# Patient Record
Sex: MALE | Race: WHITE | Employment: OTHER | URBAN - METROPOLITAN AREA
[De-identification: names, ages, dates, MRNs, and addresses within clinical notes are randomized per-mention and may not be internally consistent; named-entity substitution may affect disease eponyms.]

---

## 2018-03-23 ENCOUNTER — RECORDS - HEALTHEAST (OUTPATIENT)
Dept: LAB | Facility: CLINIC | Age: 78
End: 2018-03-23

## 2018-03-23 LAB — INR PPP: 1.68 (ref 0.9–1.1)

## 2018-03-29 ENCOUNTER — RECORDS - HEALTHEAST (OUTPATIENT)
Dept: LAB | Facility: CLINIC | Age: 78
End: 2018-03-29

## 2018-03-30 LAB — INR PPP: 1.53 (ref 0.9–1.1)

## 2018-04-05 ENCOUNTER — RECORDS - HEALTHEAST (OUTPATIENT)
Dept: LAB | Facility: CLINIC | Age: 78
End: 2018-04-05

## 2018-04-06 LAB — INR PPP: 1.93 (ref 0.9–1.1)

## 2018-04-12 ENCOUNTER — RECORDS - HEALTHEAST (OUTPATIENT)
Dept: LAB | Facility: CLINIC | Age: 78
End: 2018-04-12

## 2018-04-13 LAB — INR PPP: 1.87 (ref 0.9–1.1)

## 2018-04-17 DIAGNOSIS — Z79.01 LONG-TERM (CURRENT) USE OF ANTICOAGULANTS: Primary | ICD-10-CM

## 2018-04-17 PROCEDURE — 85610 PROTHROMBIN TIME: CPT | Performed by: FAMILY MEDICINE

## 2018-04-17 PROCEDURE — 36415 COLL VENOUS BLD VENIPUNCTURE: CPT | Performed by: FAMILY MEDICINE

## 2018-04-18 DIAGNOSIS — Z79.01 LONG-TERM (CURRENT) USE OF ANTICOAGULANTS: Primary | ICD-10-CM

## 2018-04-18 LAB — INR PPP: 2.48 (ref 0.86–1.14)

## 2018-04-23 ENCOUNTER — RECORDS - HEALTHEAST (OUTPATIENT)
Dept: LAB | Facility: CLINIC | Age: 78
End: 2018-04-23

## 2018-04-24 LAB — INR PPP: 3.35 (ref 0.9–1.1)

## 2018-05-01 ENCOUNTER — RECORDS - HEALTHEAST (OUTPATIENT)
Dept: LAB | Facility: CLINIC | Age: 78
End: 2018-05-01

## 2018-05-02 LAB — INR PPP: 3.09 (ref 0.9–1.1)

## 2018-05-16 ENCOUNTER — RECORDS - HEALTHEAST (OUTPATIENT)
Dept: LAB | Facility: CLINIC | Age: 78
End: 2018-05-16

## 2018-05-17 LAB — INR PPP: 3.83 (ref 0.9–1.1)

## 2018-05-24 ENCOUNTER — RECORDS - HEALTHEAST (OUTPATIENT)
Dept: LAB | Facility: CLINIC | Age: 78
End: 2018-05-24

## 2018-05-24 LAB
INR PPP: 2.87 (ref 0.9–1.1)
TSH SERPL DL<=0.005 MIU/L-ACNC: 1.06 UIU/ML (ref 0.3–5)

## 2018-05-30 ENCOUNTER — RECORDS - HEALTHEAST (OUTPATIENT)
Dept: LAB | Facility: CLINIC | Age: 78
End: 2018-05-30

## 2018-05-30 LAB — INR PPP: 2.93 (ref 0.9–1.1)

## 2018-06-07 ENCOUNTER — RECORDS - HEALTHEAST (OUTPATIENT)
Dept: LAB | Facility: CLINIC | Age: 78
End: 2018-06-07

## 2018-06-08 ENCOUNTER — MEDICAL CORRESPONDENCE (OUTPATIENT)
Dept: HEALTH INFORMATION MANAGEMENT | Facility: CLINIC | Age: 78
End: 2018-06-08

## 2018-06-08 LAB — INR PPP: 3.76 (ref 0.9–1.1)

## 2018-06-18 ENCOUNTER — RECORDS - HEALTHEAST (OUTPATIENT)
Dept: LAB | Facility: CLINIC | Age: 78
End: 2018-06-18

## 2018-06-18 LAB — INR PPP: 3.07 (ref 0.9–1.1)

## 2018-06-29 ENCOUNTER — RECORDS - HEALTHEAST (OUTPATIENT)
Dept: LAB | Facility: CLINIC | Age: 78
End: 2018-06-29

## 2018-07-02 LAB — INR PPP: 3.01 (ref 0.9–1.1)

## 2018-07-16 ENCOUNTER — RECORDS - HEALTHEAST (OUTPATIENT)
Dept: LAB | Facility: CLINIC | Age: 78
End: 2018-07-16

## 2018-07-16 LAB — INR PPP: 3.55 (ref 0.9–1.1)

## 2018-07-25 ENCOUNTER — RECORDS - HEALTHEAST (OUTPATIENT)
Dept: LAB | Facility: CLINIC | Age: 78
End: 2018-07-25

## 2018-07-25 LAB
ALBUMIN SERPL-MCNC: 4 G/DL (ref 3.5–5)
ALP SERPL-CCNC: 67 U/L (ref 45–120)
ALT SERPL W P-5'-P-CCNC: 23 U/L (ref 0–45)
ANION GAP SERPL CALCULATED.3IONS-SCNC: 11 MMOL/L (ref 5–18)
AST SERPL W P-5'-P-CCNC: 25 U/L (ref 0–40)
BILIRUB SERPL-MCNC: 0.5 MG/DL (ref 0–1)
BUN SERPL-MCNC: 23 MG/DL (ref 8–28)
CALCIUM SERPL-MCNC: 9.7 MG/DL (ref 8.5–10.5)
CHLORIDE BLD-SCNC: 107 MMOL/L (ref 98–107)
CO2 SERPL-SCNC: 21 MMOL/L (ref 22–31)
CREAT SERPL-MCNC: 1.23 MG/DL (ref 0.7–1.3)
ERYTHROCYTE [DISTWIDTH] IN BLOOD BY AUTOMATED COUNT: 13.2 % (ref 11–14.5)
GFR SERPL CREATININE-BSD FRML MDRD: 57 ML/MIN/1.73M2
GLUCOSE BLD-MCNC: 171 MG/DL (ref 70–125)
HCT VFR BLD AUTO: 39 % (ref 40–54)
HGB BLD-MCNC: 13.3 G/DL (ref 14–18)
MCH RBC QN AUTO: 32.1 PG (ref 27–34)
MCHC RBC AUTO-ENTMCNC: 34.1 G/DL (ref 32–36)
MCV RBC AUTO: 94 FL (ref 80–100)
PLATELET # BLD AUTO: 288 THOU/UL (ref 140–440)
PMV BLD AUTO: 10.7 FL (ref 8.5–12.5)
POTASSIUM BLD-SCNC: 4.1 MMOL/L (ref 3.5–5)
PROT SERPL-MCNC: 7.3 G/DL (ref 6–8)
RBC # BLD AUTO: 4.14 MILL/UL (ref 4.4–6.2)
SODIUM SERPL-SCNC: 139 MMOL/L (ref 136–145)
TSH SERPL DL<=0.005 MIU/L-ACNC: 0.98 UIU/ML (ref 0.3–5)
WBC: 8.4 THOU/UL (ref 4–11)

## 2018-07-26 LAB
25(OH)D3 SERPL-MCNC: 23.9 NG/ML (ref 30–80)
HBA1C MFR BLD: 7.2 % (ref 4.2–6.1)

## 2018-08-02 ENCOUNTER — RECORDS - HEALTHEAST (OUTPATIENT)
Dept: LAB | Facility: CLINIC | Age: 78
End: 2018-08-02

## 2018-08-02 LAB — INR PPP: 3.05 (ref 0.9–1.1)

## 2018-08-16 ENCOUNTER — RECORDS - HEALTHEAST (OUTPATIENT)
Dept: LAB | Facility: CLINIC | Age: 78
End: 2018-08-16

## 2018-08-16 LAB — INR PPP: 2.51 (ref 0.9–1.1)

## 2018-08-30 ENCOUNTER — RECORDS - HEALTHEAST (OUTPATIENT)
Dept: LAB | Facility: CLINIC | Age: 78
End: 2018-08-30

## 2018-08-30 LAB — INR PPP: 3.31 (ref 0.9–1.1)

## 2018-09-07 ENCOUNTER — RECORDS - HEALTHEAST (OUTPATIENT)
Dept: LAB | Facility: CLINIC | Age: 78
End: 2018-09-07

## 2018-09-07 LAB — INR PPP: 2.01 (ref 0.9–1.1)

## 2018-09-14 ENCOUNTER — RECORDS - HEALTHEAST (OUTPATIENT)
Dept: LAB | Facility: CLINIC | Age: 78
End: 2018-09-14

## 2018-09-14 LAB — INR PPP: 3.59 (ref 0.9–1.1)

## 2018-09-20 ENCOUNTER — RECORDS - HEALTHEAST (OUTPATIENT)
Dept: LAB | Facility: CLINIC | Age: 78
End: 2018-09-20

## 2018-09-20 LAB — INR PPP: 2.98 (ref 0.9–1.1)

## 2018-10-03 ENCOUNTER — RECORDS - HEALTHEAST (OUTPATIENT)
Dept: LAB | Facility: CLINIC | Age: 78
End: 2018-10-03

## 2018-10-04 LAB — INR PPP: 3.12 (ref 0.9–1.1)

## 2018-10-24 ENCOUNTER — RECORDS - HEALTHEAST (OUTPATIENT)
Dept: LAB | Facility: CLINIC | Age: 78
End: 2018-10-24

## 2018-10-25 LAB — INR PPP: 3.68 (ref 0.9–1.1)

## 2018-10-26 ENCOUNTER — APPOINTMENT (OUTPATIENT)
Dept: CT IMAGING | Facility: CLINIC | Age: 78
End: 2018-10-26
Attending: EMERGENCY MEDICINE
Payer: MEDICARE

## 2018-10-26 ENCOUNTER — HOSPITAL ENCOUNTER (EMERGENCY)
Facility: CLINIC | Age: 78
Discharge: NURSING, REHAB OR LONG-TERM CARE | End: 2018-10-26
Attending: EMERGENCY MEDICINE | Admitting: EMERGENCY MEDICINE
Payer: MEDICARE

## 2018-10-26 VITALS
WEIGHT: 185 LBS | RESPIRATION RATE: 14 BRPM | OXYGEN SATURATION: 92 % | TEMPERATURE: 98.8 F | DIASTOLIC BLOOD PRESSURE: 100 MMHG | SYSTOLIC BLOOD PRESSURE: 172 MMHG | HEART RATE: 65 BPM

## 2018-10-26 DIAGNOSIS — R19.7 DIARRHEA, UNSPECIFIED TYPE: ICD-10-CM

## 2018-10-26 LAB
ANION GAP SERPL CALCULATED.3IONS-SCNC: 9 MMOL/L (ref 3–14)
BASOPHILS # BLD AUTO: 0 10E9/L (ref 0–0.2)
BASOPHILS NFR BLD AUTO: 0.2 %
BUN SERPL-MCNC: 26 MG/DL (ref 7–30)
CALCIUM SERPL-MCNC: 9.5 MG/DL (ref 8.5–10.1)
CHLORIDE SERPL-SCNC: 105 MMOL/L (ref 94–109)
CO2 SERPL-SCNC: 25 MMOL/L (ref 20–32)
CREAT SERPL-MCNC: 1.1 MG/DL (ref 0.66–1.25)
DIFFERENTIAL METHOD BLD: ABNORMAL
EOSINOPHIL # BLD AUTO: 0 10E9/L (ref 0–0.7)
EOSINOPHIL NFR BLD AUTO: 0.5 %
ERYTHROCYTE [DISTWIDTH] IN BLOOD BY AUTOMATED COUNT: 13.3 % (ref 10–15)
GFR SERPL CREATININE-BSD FRML MDRD: 64 ML/MIN/1.7M2
GLUCOSE SERPL-MCNC: 128 MG/DL (ref 70–99)
HCT VFR BLD AUTO: 39.6 % (ref 40–53)
HEMOCCULT STL QL: NEGATIVE
HGB BLD-MCNC: 13.4 G/DL (ref 13.3–17.7)
IMM GRANULOCYTES # BLD: 0 10E9/L (ref 0–0.4)
IMM GRANULOCYTES NFR BLD: 0.1 %
INR PPP: 3.38 (ref 0.86–1.14)
LYMPHOCYTES # BLD AUTO: 2.4 10E9/L (ref 0.8–5.3)
LYMPHOCYTES NFR BLD AUTO: 29.9 %
MCH RBC QN AUTO: 31.1 PG (ref 26.5–33)
MCHC RBC AUTO-ENTMCNC: 33.8 G/DL (ref 31.5–36.5)
MCV RBC AUTO: 92 FL (ref 78–100)
MONOCYTES # BLD AUTO: 0.6 10E9/L (ref 0–1.3)
MONOCYTES NFR BLD AUTO: 7.1 %
NEUTROPHILS # BLD AUTO: 5 10E9/L (ref 1.6–8.3)
NEUTROPHILS NFR BLD AUTO: 62.2 %
NRBC # BLD AUTO: 0 10*3/UL
NRBC BLD AUTO-RTO: 0 /100
PLATELET # BLD AUTO: 266 10E9/L (ref 150–450)
POTASSIUM SERPL-SCNC: 4.3 MMOL/L (ref 3.4–5.3)
RBC # BLD AUTO: 4.31 10E12/L (ref 4.4–5.9)
SODIUM SERPL-SCNC: 139 MMOL/L (ref 133–144)
WBC # BLD AUTO: 8 10E9/L (ref 4–11)

## 2018-10-26 PROCEDURE — 99285 EMERGENCY DEPT VISIT HI MDM: CPT | Mod: 25

## 2018-10-26 PROCEDURE — 74177 CT ABD & PELVIS W/CONTRAST: CPT

## 2018-10-26 PROCEDURE — 85610 PROTHROMBIN TIME: CPT | Performed by: EMERGENCY MEDICINE

## 2018-10-26 PROCEDURE — 85025 COMPLETE CBC W/AUTO DIFF WBC: CPT | Performed by: EMERGENCY MEDICINE

## 2018-10-26 PROCEDURE — 82272 OCCULT BLD FECES 1-3 TESTS: CPT | Performed by: EMERGENCY MEDICINE

## 2018-10-26 PROCEDURE — 80048 BASIC METABOLIC PNL TOTAL CA: CPT | Performed by: EMERGENCY MEDICINE

## 2018-10-26 PROCEDURE — 25000125 ZZHC RX 250: Performed by: EMERGENCY MEDICINE

## 2018-10-26 PROCEDURE — 25000128 H RX IP 250 OP 636: Performed by: EMERGENCY MEDICINE

## 2018-10-26 RX ORDER — IOPAMIDOL 755 MG/ML
93 INJECTION, SOLUTION INTRAVASCULAR ONCE
Status: COMPLETED | OUTPATIENT
Start: 2018-10-26 | End: 2018-10-26

## 2018-10-26 RX ADMIN — IOPAMIDOL 93 ML: 755 INJECTION, SOLUTION INTRAVENOUS at 14:18

## 2018-10-26 RX ADMIN — SODIUM CHLORIDE, PRESERVATIVE FREE 68 ML: 5 INJECTION INTRAVENOUS at 14:20

## 2018-10-26 ASSESSMENT — ENCOUNTER SYMPTOMS
BLOOD IN STOOL: 0
HEMATURIA: 0
VOMITING: 0
NAUSEA: 1
DIARRHEA: 1
FEVER: 0
CONSTIPATION: 0
ABDOMINAL PAIN: 0

## 2018-10-26 NOTE — ED PROVIDER NOTES
History     Chief Complaint:  diarrhea    HPI   Chance Shrestha is a 78 year old male with a history of hemorrhagic stroke with subsequent memory impairment, PVD, DM, A-fib, an aortic valve replacement on coumadin, CHF, and a gastric bleed from an E. Coli infection who presents with dark colored diarrhea. The patient states that for the last couple weeks he has been experiencing a couple episodes a day of black, watery stool. The patient notes that prior to these episodes of diarrhea he was started on a depression medication. He stopped taking the depression about a week ago after talking to his MD and felt that the diarrhea started to get better, however today is was worse again. The patient also reports nausea associated with the diarrhea and abdominal pressure which started when he started taking the depression medication. He denies any vomiting, focal abdominal pain, fevers, hematuria, bright red blood in her stool, or diet changes. The patient does not take any constipation medications and he did receive a pyophylactic dose of amoxicillin 2g days ago for a dental cleaning. He has no known sick contacts.      Allergies:  Oxycodone     Medications:    Remeron  Protonix  Levothyroxine  Ditropan  Seroquel  Zofran  Coumadin  Lopressor  Synthroid  Norvasc  Mirtazapine  Lipitor  Glucophage     Past Medical History:    Constipation  Depression  DM type 2  GERD  HLD  HTN  Hypothyroid  Ischemic heart disease  Seizures  Systolic CHF  Stroke  PVD    Past Surgical History:    Hip replacement   Aortic valve replacement    Family History:    No past pertinent family history.    Social History:  Patient presents with family  Negative for tobacco use.  Negative for alcohol use.  Marital Status:        Review of Systems   Constitutional: Negative for fever.   Gastrointestinal: Positive for diarrhea and nausea. Negative for abdominal pain, blood in stool, constipation and vomiting.   Genitourinary: Negative for  hematuria.   All other systems reviewed and are negative.      Physical Exam   First Vitals:  BP: 159/90  Pulse: 70  Temp: 98.8  F (37.1  C)  Resp: 14  Weight: 83.9 kg (185 lb)  SpO2: 95 %      Physical Exam    Physical Exam   Constitutional:  Patient is oriented to person, place, and time. They appear well-developed and well-nourished. Mild distress secondary to abdominal pressure   HENT:   Mouth/Throat:   Oropharynx is clear and moist.   Eyes:    Conjunctivae normal and EOM are normal. Pupils are equal, round, and reactive to light.   Neck:    Normal range of motion.   Cardiovascular: Normal rate, regular rhythm and normal heart sounds.  Exam reveals no gallop and no friction rub.  No murmur heard.  Pulmonary/Chest:  Effort normal and breath sounds normal. Patient has no wheezes. Patient has no rales.   Abdominal:   Soft. Bowel sounds are normal. Patient exhibits no mass. There is no tenderness. There is no rebound and no guarding.   Musculoskeletal:  Normal range of motion. Patient exhibits no edema.   Neurological:   Patient is alert and oriented to person, place, and time. Patient has normal strength. No cranial nerve deficit or sensory deficit. GCS 15  Skin:   Skin is warm and dry. No rash noted. No erythema.   Psychiatric:   Patient has a normal mood and affect. Patient's behavior is normal. Judgment and thought content normal.   :   Digital rectal exam was performed. There was scant amount of stool, no bright red blood per rectum, no melenotic stool. No hemorrhoids. No pain.    Emergency Department Course   Imaging:  Radiographic findings were communicated with the patient who voiced understanding of the findings.  CT Abdomen/Pelvis with IV contrast:   No acute abdominopelvic abnormality.  per radiology.       Laboratory:  CBC: WBC: 8.0, HGB: 13.4, PLT: 266  BMP: Glucose 128 (H),  o/w WNL (Creatinine: 1.10)  INR: 3.38  Occult stool: Negative  Stool Sample : unable to provide    Emergency Department  Course:  Nursing notes and vitals reviewed.  1356: I performed an exam of the patient as documented above.     1506: I rechecked the patient. Explained findings to patient.    1545: I rechecked the patient. Findings and plan explained to the Patient. Patient discharged home with instructions regarding supportive care, medications, and reasons to return. The importance of close follow-up was reviewed.       Impression & Plan    Medical Decision Making:  Chance Shrestha is a 78 year old male who presents with diarrhea that appeared black today. He had no focal abdominal pain, although he did complain of some vague pressure. He is on Coumadin and actually did recently take a high dose of Amoxicillin prior to his dental cleaning 2 days ago. His work up here shows he is therapeutic on his Coumadin levels. He has no metabolic derangement. His hemoglobin and white blood cell count are normal. I did do a CT scan, there is no evidence of colitis. He is unable to provide a stool sample here. Heme occult was negative and he is vitally stable.    At this point, I suspect that this is worsening episode today is secondary to his antibiotics. His initial antidepressant medication started 2 weeks ago may have been the initial culprit, as him symptoms seemed to get better when he stopped that medication, until the antibiotic was taken. I did write for a C diff order but he is unable to provide a stool. At this point there are no acute medical needs that would require hospitalization. I would like him to follow up with his primary care doctor. He is staying well hydrated and does not have multiple stools at this time. Return to the emergency department if he develops bright red blood, focal abdominal pain, or lightheadedness.     Diagnosis:    ICD-10-CM    1. Diarrhea, unspecified type R19.7        Disposition:  discharged to home    Oh MASTERSON, am serving as a scribe on 10/26/2018 at 1:56 PM to personally document services  performed by Brenda Steele MD based on my observations and the provider's statements to me.       Oh Richardson  10/26/2018    EMERGENCY DEPARTMENT       Brenda Steele MD  10/27/18 0918

## 2018-10-26 NOTE — ED AVS SNAPSHOT
Emergency Department    [address] [place] [address]    Phone:  [phone]    Fax:  [phone]                                       Chance Shrestha   MRN: [id number]    Department:   Emergency Department   Date of Visit:  10/26/2018           After Visit Summary Signature Page     I have received my discharge instructions, and my questions have been answered. I have discussed any challenges I see with this plan with the nurse or doctor.    ..........................................................................................................................................  Patient/Patient Representative Signature      ..........................................................................................................................................  Patient Representative Print Name and Relationship to Patient    ..................................................               ................................................  Date                                   Time    ..........................................................................................................................................  Reviewed by Signature/Title    ...................................................              ..............................................  Date                                               Time          22EPIC Rev 08/18

## 2018-10-26 NOTE — ED AVS SNAPSHOT
Emergency Department    [address] [facility] [address]    Phone:  [phone]    Fax:  [phone]                                       Chance Shrestha   MRN: [id number]    Department:   Emergency Department   Date of Visit:  10/26/2018           Patient Information     Date Of Birth          1940        Your diagnoses for this visit were:     Diarrhea, unspecified type        You were seen by Brenda Steele MD.      Follow-up Information     Follow up with Ernie Shrestha In 3 days.    Contact information:    [facility]  [address]    [place] [id number] [phone]          Discharge Instructions         Diarrhea with Uncertain Cause (Adult)    Diarrhea is when stools are loose and watery. This can be caused by:    Viral infections    Bacterial infections    Food poisoning    Parasites    Irritable bowel syndrome (IBS)    Inflammatory bowel diseases such as ulcerative colitis, Crohn's disease, and celiac disease    Food intolerance, such as to lactose, the sugar found in milk and milk products    Reaction to medicines like antibiotics, laxatives, cancer drugs, and antacids  Along with diarrhea, you may also have:    Abdominal pain and cramping    Nausea and vomiting    Loss of bowel control    Fever and chills    Bloody stools  In some cases, antibiotics may help to treat diarrhea. You may have a stool sample test. This is done to see what is causing your diarrhea, and if antibiotics will help treat it. The results of a stool sample test may take up to 2 days. The healthcare provider may not give you antibiotics until he or she has the stool test results.  Diarrhea can cause dehydration. This is the loss of too much water and other fluids from the body. When this occurs, body fluid must be replaced. This can be done with oral rehydration solutions. Oral rehydration solutions are available at drugstores and grocery stores without a  prescription.  Home care  Follow all instructions given by your healthcare provider. Rest at home for the next 24 hours, or until you feel better. Avoid caffeine, tobacco, and alcohol. These can make diarrhea, cramping, and pain worse.  If taking medicines:    Don t take over-the-counter diarrhea or nausea medicines unless your healthcare provider tells you to.    You may use acetaminophen or NSAID medicines like ibuprofen or naproxen to reduce pain and fever. Don t use these if you have chronic liver or kidney disease, or ever had a stomach ulcer or gastrointestinal bleeding. Don't use NSAID medicines if you are already taking one for another condition (like arthritis) or are on daily aspirin therapy (such as for heart disease or after a stroke). Talk with your healthcare provider first.    If antibiotics were prescribed, be sure you take them until they are finished. Don t stop taking them even when you feel better. Antibiotics must be taken as a full course.  To prevent the spread of illness:    Remember that washing with soap and water and using alcohol-based  is the best way to prevent the spread of infection.    Clean the toilet after each use.    Wash your hands before eating.    Wash your hands before and after preparing food. Keep in mind that people with diarrhea or vomiting should not prepare food for others.    Wash your hands after using cutting boards, countertops, and knives that have been in contact with raw foods.    Wash and then peel fruits and vegetables.    Keep uncooked meats away from cooked and ready-to-eat foods.    Use a food thermometer when cooking. Cook poultry to at least 165 F (74 C). Cook ground meat (beef, veal, pork, lamb) to at least 160 F (71 C). Cook fresh beef, veal, lamb, and pork to at least 145 F (63 C).    Don t eat raw or undercooked eggs (poached or yuniel side up), poultry, meat, or unpasteurized milk and juices.  Food and drinks  The main goal while treating  vomiting or diarrhea is to prevent dehydration. This is done by taking small amounts of liquids often.    Keep in mind that liquids are more important than food right now.    Drink only small amounts of liquids at a time.    Don t force yourself to eat, especially if you are having cramping, vomiting, or diarrhea. Don t eat large amounts at a time, even if you are hungry.    If you eat, avoid fatty, greasy, spicy, or fried foods.    Don t eat dairy foods or drink milk if you have diarrhea. These can make diarrhea worse.  During the first 24 hours you can try:    Oral rehydration solutions. Do not use sports drinks. They have too much sugar and not enough electrolytes.    Soft drinks without caffeine    Ginger ale    Water (plain or flavored)    Decaf tea or coffee    Clear broth, consommé, or bouillon    Gelatin, popsicles, or frozen fruit juice bars  The second 24 hours, if you are feeling better, you can add:    Hot cereal, plain toast, bread, rolls, or crackers    Plain noodles, rice, mashed potatoes, chicken noodle soup, or rice soup    Unsweetened canned fruit (no pineapple)    Bananas  As you recover:    Limit fat intake to less than 15 grams per day. Don t eat margarine, butter, oils, mayonnaise, sauces, gravies, fried foods, peanut butter, meat, poultry, or fish.    Limit fiber. Don t eat raw or cooked vegetables, fresh fruits except bananas, or bran cereals.    Limit caffeine and chocolate.    Limit dairy.    Don t use spices or seasonings except salt.    Go back to your normal diet over time, as you feel better and your symptoms improve.    If the symptoms come back, go back to a simple diet or clear liquids.  Follow-up care  Follow up with your healthcare provider, or as advised. If a stool sample was taken or cultures were done, call the healthcare provider for the results as instructed.  Call 911  Call 911 if you have any of these symptoms:    Trouble breathing    Confusion    Extreme drowsiness or  trouble walking    Loss of consciousness    Rapid heart rate    Chest pain    Stiff neck    Seizure  When to seek medical advice  Call your healthcare provider right away if any of these occur:    Abdominal pain that gets worse    Constant lower right abdominal pain    Continued vomiting and inability to keep liquids down    Diarrhea more than 5 times a day    Blood in vomit or stool    Dark urine or no urine for 8 hours, dry mouth and tongue, tiredness, weakness, or dizziness    Drowsiness    New rash    You don t get better in 2 to 3 days    Fever of 100.4 F (38 C) or higher, or as directed by your healthcare provider  Date Last Reviewed: 1/3/2016    [id number] [employer]. [address] [place] [address], [place] [address]. All rights reserved. This information is not intended as a substitute for professional medical care. Always follow your healthcare professional's instructions.          24 Hour Appointment Hotline       To make an appointment at any [facility], call [phone] ([phone]). If you don't have a family doctor or clinic, we will help you find one. [facility] clinics are conveniently located to serve the needs of you and your family.             Review of your medicines      Notice     You have not been prescribed any medications.            Procedures and tests performed during your visit     Basic metabolic panel    CBC with platelets + differential    CT Abdomen Pelvis w Contrast    Give 20 ounces of water 15 minutes before CT of abdomen    INR    Occult blood stool      Orders Needing Specimen Collection     Ordered          10/26/18 1326  Clostridium difficile toxin B PCR - ROUTINE, [facility]: Routine, Needs to be Collected     Scheduled Task Status   10/26/18 1327 Collect Clostridium difficile toxin B PCR Open   Order Class:  PCU Collect                  Pending Results     No orders found from 10/24/2018 to 10/27/2018.            Pending Culture Results     No orders found from  10/24/2018 to 10/27/2018.            Pending Results Instructions     If you had any lab results that were not finalized at the time of your Discharge, you can call the ED Lab Result RN at [phone]. You will be contacted by this team for any positive Lab results or changes in treatment. The nurses are available 7 days a week from 10A to 6:30P.  You can leave a message 24 hours per day and they will return your call.        Test Results From Your Hospital Stay        10/26/2018  1:16 PM      Component Results     Component Value Ref Range & Units Status    WBC 8.0 4.0 - 11.0 10e9/L Final    RBC Count 4.31 (L) 4.4 - 5.9 10e12/L Final    Hemoglobin 13.4 13.3 - 17.7 g/dL Final    Hematocrit 39.6 (L) 40.0 - 53.0 % Final    MCV 92 78 - 100 fl Final    MCH 31.1 26.5 - 33.0 pg Final    MCHC 33.8 31.5 - 36.5 g/dL Final    RDW 13.3 10.0 - 15.0 % Final    Platelet Count 266 150 - 450 10e9/L Final    Diff Method Automated Method  Final    % Neutrophils 62.2 % Final    % Lymphocytes 29.9 % Final    % Monocytes 7.1 % Final    % Eosinophils 0.5 % Final    % Basophils 0.2 % Final    % Immature Granulocytes 0.1 % Final    Nucleated RBCs 0 0 /100 Final    Absolute Neutrophil 5.0 1.6 - 8.3 10e9/L Final    Absolute Lymphocytes 2.4 0.8 - 5.3 10e9/L Final    Absolute Monocytes 0.6 0.0 - 1.3 10e9/L Final    Absolute Eosinophils 0.0 0.0 - 0.7 10e9/L Final    Absolute Basophils 0.0 0.0 - 0.2 10e9/L Final    Abs Immature Granulocytes 0.0 0 - 0.4 10e9/L Final    Absolute Nucleated RBC 0.0  Final         10/26/2018  1:38 PM      Component Results     Component Value Ref Range & Units Status    Sodium 139 133 - 144 mmol/L Final    Potassium 4.3 3.4 - 5.3 mmol/L Final    Chloride 105 94 - 109 mmol/L Final    Carbon Dioxide 25 20 - 32 mmol/L Final    Anion Gap 9 3 - 14 mmol/L Final    Glucose 128 (H) 70 - 99 mg/dL Final    Urea Nitrogen 26 7 - 30 mg/dL Final    Creatinine 1.10 0.66 - 1.25 mg/dL Final    GFR Estimate 64 >60 mL/min/1.7m2 Final     Non  GFR Calc    GFR Estimate If Black 78 >60 mL/min/1.7m2 Final    African American GFR Calc    Calcium 9.5 8.5 - 10.1 mg/dL Final         10/26/2018  1:36 PM      Component Results     Component Value Ref Range & Units Status    INR 3.38 (H) 0.86 - 1.14 Final         10/26/2018  2:52 PM      Narrative     CT ABDOMEN AND PELVIS WITH CONTRAST   10/26/2018 2:24 PM     HISTORY: Diarrhea.    TECHNIQUE:   No IV contrast material. Radiation dose for this scan was  reduced using automated exposure control, adjustment of the mA and/or  kV according to patient size, or iterative reconstruction technique.    COMPARISON: None.    FINDINGS:  Gallbladder is surgically absent. The liver demonstrates a  1 cm area of hyperattenuation within the inferomedial aspect of the  liver which is incompletely characterized, statistically likely benign  (series 2 image 31). Spleen, adrenals, and pancreas are unremarkable.  Pancreas is mildly atrophic. Abdominal aorta is unremarkable. Kidneys  enhance symmetrically without evidence of hydronephrosis. 1 cm lesions  (2) within the left renal cortex are statistically likely benign cysts  (series 2 image 45, 33). Bowel is unremarkable. No evidence of ascites  or free air. Bladder is unremarkable. Sigmoid diverticulosis is  present without evidence of diverticulitis. Focal narrowing of the  transverse colon statistically likely represents peristalsis (series 2  image 20) Right hip prosthesis is present. Bone windows demonstrate  scattered degenerative change, including rightward listhesis of L3 on  L4. No destructive or aggressive osseous lesions are identified.        Impression     IMPRESSION: No acute abdominopelvic abnormality.    MICHA JESSICA MD         10/26/2018  3:30 PM      Component Results     Component Value Ref Range & Units Status    Occult Blood Negative NEG^Negative Final                Clinical Quality Measure: Blood Pressure Screening     Your blood pressure  "was checked while you were in the emergency department today. The last reading we obtained was  BP: (!) 172/100 . Please read the guidelines below about what these numbers mean and what you should do about them.  If your systolic blood pressure (the top number) is less than 120 and your diastolic blood pressure (the bottom number) is less than 80, then your blood pressure is normal. There is nothing more that you need to do about it.  If your systolic blood pressure (the top number) is 120-139 or your diastolic blood pressure (the bottom number) is 80-89, your blood pressure may be higher than it should be. You should have your blood pressure rechecked within a year by a primary care provider.  If your systolic blood pressure (the top number) is 140 or greater or your diastolic blood pressure (the bottom number) is 90 or greater, you may have high blood pressure. High blood pressure is treatable, but if left untreated over time it can put you at risk for heart attack, stroke, or kidney failure. You should have your blood pressure rechecked by a primary care provider within the next 4 weeks.  If your provider in the emergency department today gave you specific instructions to follow-up with your doctor or provider even sooner than that, you should follow that instruction and not wait for up to 4 weeks for your follow-up visit.        Thank you for choosing [facility]       Thank you for choosing [facility] for your care. Our goal is always to provide you with excellent care. Hearing back from our patients is one way we can continue to improve our services. Please take a few minutes to complete the written survey that you may receive in the mail after you visit with us. Thank you!        [employer] Information     [employer] lets you send messages to your doctor, view your test results, renew your prescriptions, schedule appointments and more. To sign up, go to [email or web address] . Click on \"Log in\" on the left side of " "the screen, which will take you to the Welcome page. Then click on \"Sign up Now\" on the right side of the page.     You will be asked to enter the access code listed below, as well as some personal information. Please follow the directions to create your username and password.     Your access code is: [email or web address]  Expires: 2019  4:03 PM     Your access code will  in 90 days. If you need help or a new code, please call your [facility] clinic or [phone].        Care EveryWhere ID     This is your Care EveryWhere ID. This could be used by other organizations to access your [facility] medical records  [id number]        Equal Access to Services     TITI BOWSER : Cheko Briones, beryl barker, jackie mccabe, shireen abraham. So [facility] [phone].    ATENCIÓN: Si habla español, tiene a hernandez disposición servicios gratuitos de asistencia lingüística. Llame al [phone].    We comply with applicable federal civil rights laws and [place] laws. We do not discriminate on the basis of race, color, national origin, age, disability, sex, sexual orientation, or gender identity.            After Visit Summary       This is your record. Keep this with you and show to your community pharmacist(s) and doctor(s) at your next visit.                  "

## 2018-10-26 NOTE — ED NOTES
Bed: ED02  Expected date:   Expected time:   Means of arrival:   Comments:  kai - 78M gi bleed eta 1250

## 2018-10-26 NOTE — DISCHARGE INSTRUCTIONS
Diarrhea with Uncertain Cause (Adult)    Diarrhea is when stools are loose and watery. This can be caused by:    Viral infections    Bacterial infections    Food poisoning    Parasites    Irritable bowel syndrome (IBS)    Inflammatory bowel diseases such as ulcerative colitis, Crohn's disease, and celiac disease    Food intolerance, such as to lactose, the sugar found in milk and milk products    Reaction to medicines like antibiotics, laxatives, cancer drugs, and antacids  Along with diarrhea, you may also have:    Abdominal pain and cramping    Nausea and vomiting    Loss of bowel control    Fever and chills    Bloody stools  In some cases, antibiotics may help to treat diarrhea. You may have a stool sample test. This is done to see what is causing your diarrhea, and if antibiotics will help treat it. The results of a stool sample test may take up to 2 days. The healthcare provider may not give you antibiotics until he or she has the stool test results.  Diarrhea can cause dehydration. This is the loss of too much water and other fluids from the body. When this occurs, body fluid must be replaced. This can be done with oral rehydration solutions. Oral rehydration solutions are available at drugstores and grocery stores without a prescription.  Home care  Follow all instructions given by your healthcare provider. Rest at home for the next 24 hours, or until you feel better. Avoid caffeine, tobacco, and alcohol. These can make diarrhea, cramping, and pain worse.  If taking medicines:    Don t take over-the-counter diarrhea or nausea medicines unless your healthcare provider tells you to.    You may use acetaminophen or NSAID medicines like ibuprofen or naproxen to reduce pain and fever. Don t use these if you have chronic liver or kidney disease, or ever had a stomach ulcer or gastrointestinal bleeding. Don't use NSAID medicines if you are already taking one for another condition (like arthritis) or are on daily  aspirin therapy (such as for heart disease or after a stroke). Talk with your healthcare provider first.    If antibiotics were prescribed, be sure you take them until they are finished. Don t stop taking them even when you feel better. Antibiotics must be taken as a full course.  To prevent the spread of illness:    Remember that washing with soap and water and using alcohol-based  is the best way to prevent the spread of infection.    Clean the toilet after each use.    Wash your hands before eating.    Wash your hands before and after preparing food. Keep in mind that people with diarrhea or vomiting should not prepare food for others.    Wash your hands after using cutting boards, countertops, and knives that have been in contact with raw foods.    Wash and then peel fruits and vegetables.    Keep uncooked meats away from cooked and ready-to-eat foods.    Use a food thermometer when cooking. Cook poultry to at least 165 F (74 C). Cook ground meat (beef, veal, pork, lamb) to at least 160 F (71 C). Cook fresh beef, veal, lamb, and pork to at least 145 F (63 C).    Don t eat raw or undercooked eggs (poached or yuniel side up), poultry, meat, or unpasteurized milk and juices.  Food and drinks  The main goal while treating vomiting or diarrhea is to prevent dehydration. This is done by taking small amounts of liquids often.    Keep in mind that liquids are more important than food right now.    Drink only small amounts of liquids at a time.    Don t force yourself to eat, especially if you are having cramping, vomiting, or diarrhea. Don t eat large amounts at a time, even if you are hungry.    If you eat, avoid fatty, greasy, spicy, or fried foods.    Don t eat dairy foods or drink milk if you have diarrhea. These can make diarrhea worse.  During the first 24 hours you can try:    Oral rehydration solutions. Do not use sports drinks. They have too much sugar and not enough electrolytes.    Soft drinks without  caffeine    Ginger ale    Water (plain or flavored)    Decaf tea or coffee    Clear broth, consommé, or bouillon    Gelatin, popsicles, or frozen fruit juice bars  The second 24 hours, if you are feeling better, you can add:    Hot cereal, plain toast, bread, rolls, or crackers    Plain noodles, rice, mashed potatoes, chicken noodle soup, or rice soup    Unsweetened canned fruit (no pineapple)    Bananas  As you recover:    Limit fat intake to less than 15 grams per day. Don t eat margarine, butter, oils, mayonnaise, sauces, gravies, fried foods, peanut butter, meat, poultry, or fish.    Limit fiber. Don t eat raw or cooked vegetables, fresh fruits except bananas, or bran cereals.    Limit caffeine and chocolate.    Limit dairy.    Don t use spices or seasonings except salt.    Go back to your normal diet over time, as you feel better and your symptoms improve.    If the symptoms come back, go back to a simple diet or clear liquids.  Follow-up care  Follow up with your healthcare provider, or as advised. If a stool sample was taken or cultures were done, call the healthcare provider for the results as instructed.  Call 911  Call 911 if you have any of these symptoms:    Trouble breathing    Confusion    Extreme drowsiness or trouble walking    Loss of consciousness    Rapid heart rate    Chest pain    Stiff neck    Seizure  When to seek medical advice  Call your healthcare provider right away if any of these occur:    Abdominal pain that gets worse    Constant lower right abdominal pain    Continued vomiting and inability to keep liquids down    Diarrhea more than 5 times a day    Blood in vomit or stool    Dark urine or no urine for 8 hours, dry mouth and tongue, tiredness, weakness, or dizziness    Drowsiness    New rash    You don t get better in 2 to 3 days    Fever of 100.4 F (38 C) or higher, or as directed by your healthcare provider  Date Last Reviewed: 1/3/2016    6645-9366 [employer] [address]  [place] [address]. All rights reserved. This information is not intended as a substitute for professional medical care. Always follow your healthcare professional's instructions.

## 2018-10-29 ENCOUNTER — RECORDS - HEALTHEAST (OUTPATIENT)
Dept: LAB | Facility: CLINIC | Age: 78
End: 2018-10-29

## 2018-10-29 LAB — HGB BLD-MCNC: 13.2 G/DL (ref 14–18)

## 2018-10-30 LAB — HBA1C MFR BLD: 7.7 % (ref 4.2–6.1)

## 2018-11-01 ENCOUNTER — RECORDS - HEALTHEAST (OUTPATIENT)
Dept: LAB | Facility: CLINIC | Age: 78
End: 2018-11-01

## 2018-11-01 LAB — INR PPP: 4.16 (ref 0.9–1.1)

## 2018-11-08 ENCOUNTER — RECORDS - HEALTHEAST (OUTPATIENT)
Dept: LAB | Facility: CLINIC | Age: 78
End: 2018-11-08

## 2018-11-08 LAB — INR PPP: 2.49 (ref 0.9–1.1)

## 2018-11-16 ENCOUNTER — RECORDS - HEALTHEAST (OUTPATIENT)
Dept: LAB | Facility: CLINIC | Age: 78
End: 2018-11-16

## 2018-11-19 LAB — INR PPP: 2.35 (ref 0.9–1.1)

## 2018-12-05 ENCOUNTER — RECORDS - HEALTHEAST (OUTPATIENT)
Dept: LAB | Facility: CLINIC | Age: 78
End: 2018-12-05

## 2018-12-05 LAB — INR PPP: 2.61 (ref 0.9–1.1)

## 2018-12-26 ENCOUNTER — RECORDS - HEALTHEAST (OUTPATIENT)
Dept: LAB | Facility: CLINIC | Age: 78
End: 2018-12-26

## 2018-12-26 LAB — INR PPP: 1.97 (ref 0.9–1.1)

## 2019-01-03 ENCOUNTER — RECORDS - HEALTHEAST (OUTPATIENT)
Dept: LAB | Facility: CLINIC | Age: 79
End: 2019-01-03

## 2019-01-03 LAB — INR PPP: 2.86 (ref 0.9–1.1)

## 2019-02-05 ENCOUNTER — RECORDS - HEALTHEAST (OUTPATIENT)
Dept: LAB | Facility: CLINIC | Age: 79
End: 2019-02-05

## 2019-02-05 LAB — INR PPP: 3.25 (ref 0.9–1.1)

## 2019-02-19 ENCOUNTER — RECORDS - HEALTHEAST (OUTPATIENT)
Dept: LAB | Facility: CLINIC | Age: 79
End: 2019-02-19

## 2019-02-19 LAB — INR PPP: 3.86 (ref 0.9–1.1)

## 2019-02-27 ENCOUNTER — RECORDS - HEALTHEAST (OUTPATIENT)
Dept: LAB | Facility: CLINIC | Age: 79
End: 2019-02-27

## 2019-02-27 LAB
ALBUMIN SERPL-MCNC: 3.8 G/DL (ref 3.5–5)
ANION GAP SERPL CALCULATED.3IONS-SCNC: 10 MMOL/L (ref 5–18)
BUN SERPL-MCNC: 22 MG/DL (ref 8–28)
CALCIUM SERPL-MCNC: 9.6 MG/DL (ref 8.5–10.5)
CHLORIDE BLD-SCNC: 102 MMOL/L (ref 98–107)
CO2 SERPL-SCNC: 23 MMOL/L (ref 22–31)
CREAT SERPL-MCNC: 1.26 MG/DL (ref 0.7–1.3)
ERYTHROCYTE [DISTWIDTH] IN BLOOD BY AUTOMATED COUNT: 13.1 % (ref 11–14.5)
GFR SERPL CREATININE-BSD FRML MDRD: 55 ML/MIN/1.73M2
GLUCOSE BLD-MCNC: 245 MG/DL (ref 70–125)
HBA1C MFR BLD: 8.4 % (ref 4.2–6.1)
HCT VFR BLD AUTO: 38.1 % (ref 40–54)
HGB BLD-MCNC: 12.7 G/DL (ref 14–18)
MCH RBC QN AUTO: 31.3 PG (ref 27–34)
MCHC RBC AUTO-ENTMCNC: 33.3 G/DL (ref 32–36)
MCV RBC AUTO: 94 FL (ref 80–100)
PHOSPHATE SERPL-MCNC: 2.7 MG/DL (ref 2.5–4.5)
PLATELET # BLD AUTO: 288 THOU/UL (ref 140–440)
PMV BLD AUTO: 10.7 FL (ref 8.5–12.5)
POTASSIUM BLD-SCNC: 4.5 MMOL/L (ref 3.5–5)
RBC # BLD AUTO: 4.06 MILL/UL (ref 4.4–6.2)
SODIUM SERPL-SCNC: 135 MMOL/L (ref 136–145)
TSH SERPL DL<=0.005 MIU/L-ACNC: 0.61 UIU/ML (ref 0.3–5)
WBC: 7.6 THOU/UL (ref 4–11)

## 2019-03-05 ENCOUNTER — RECORDS - HEALTHEAST (OUTPATIENT)
Dept: LAB | Facility: CLINIC | Age: 79
End: 2019-03-05

## 2019-03-06 LAB — INR PPP: 2.62 (ref 0.9–1.1)

## 2019-04-04 ENCOUNTER — RECORDS - HEALTHEAST (OUTPATIENT)
Dept: LAB | Facility: CLINIC | Age: 79
End: 2019-04-04

## 2019-04-04 LAB — INR PPP: 2.38 (ref 0.9–1.1)

## 2019-04-11 ENCOUNTER — RECORDS - HEALTHEAST (OUTPATIENT)
Dept: LAB | Facility: CLINIC | Age: 79
End: 2019-04-11

## 2019-04-11 LAB — INR PPP: 2.66 (ref 0.9–1.1)

## 2019-05-02 ENCOUNTER — RECORDS - HEALTHEAST (OUTPATIENT)
Dept: LAB | Facility: CLINIC | Age: 79
End: 2019-05-02

## 2019-05-03 LAB — INR PPP: 2.31 (ref 0.9–1.1)

## 2019-05-13 ENCOUNTER — RECORDS - HEALTHEAST (OUTPATIENT)
Dept: LAB | Facility: CLINIC | Age: 79
End: 2019-05-13

## 2019-05-13 LAB
ALBUMIN SERPL-MCNC: 4 G/DL (ref 3.5–5)
ANION GAP SERPL CALCULATED.3IONS-SCNC: 13 MMOL/L (ref 5–18)
BUN SERPL-MCNC: 22 MG/DL (ref 8–28)
CALCIUM SERPL-MCNC: 10.1 MG/DL (ref 8.5–10.5)
CHLORIDE BLD-SCNC: 101 MMOL/L (ref 98–107)
CO2 SERPL-SCNC: 21 MMOL/L (ref 22–31)
CREAT SERPL-MCNC: 1.18 MG/DL (ref 0.7–1.3)
GFR SERPL CREATININE-BSD FRML MDRD: 59 ML/MIN/1.73M2
GLUCOSE BLD-MCNC: 151 MG/DL (ref 70–125)
HGB BLD-MCNC: 12.5 G/DL (ref 14–18)
INR PPP: 2.39 (ref 0.9–1.1)
PHOSPHATE SERPL-MCNC: 2.9 MG/DL (ref 2.5–4.5)
POTASSIUM BLD-SCNC: 4.3 MMOL/L (ref 3.5–5)
SODIUM SERPL-SCNC: 135 MMOL/L (ref 136–145)

## 2019-05-14 LAB — HBA1C MFR BLD: 9.4 % (ref 4.2–6.1)

## 2019-05-20 ENCOUNTER — RECORDS - HEALTHEAST (OUTPATIENT)
Dept: LAB | Facility: CLINIC | Age: 79
End: 2019-05-20

## 2019-05-20 LAB
C REACTIVE PROTEIN LHE: 0.7 MG/DL (ref 0–0.8)
INR PPP: 3.29 (ref 0.9–1.1)

## 2019-05-28 ENCOUNTER — RECORDS - HEALTHEAST (OUTPATIENT)
Dept: LAB | Facility: CLINIC | Age: 79
End: 2019-05-28

## 2019-05-28 LAB — INR PPP: 3.21 (ref 0.9–1.1)

## 2019-06-12 ENCOUNTER — HOSPITAL ENCOUNTER (OUTPATIENT)
Dept: WOUND CARE | Facility: CLINIC | Age: 79
Discharge: HOME | End: 2019-06-12
Attending: PHYSICIAN ASSISTANT | Admitting: PHYSICIAN ASSISTANT
Payer: MEDICARE

## 2019-06-12 VITALS
HEART RATE: 68 BPM | HEIGHT: 73 IN | DIASTOLIC BLOOD PRESSURE: 71 MMHG | WEIGHT: 198.2 LBS | RESPIRATION RATE: 18 BRPM | TEMPERATURE: 97.4 F | BODY MASS INDEX: 26.27 KG/M2 | SYSTOLIC BLOOD PRESSURE: 148 MMHG

## 2019-06-12 DIAGNOSIS — L97.509 TYPE 2 DIABETES MELLITUS WITH FOOT ULCER, UNSPECIFIED WHETHER LONG TERM INSULIN USE (H): ICD-10-CM

## 2019-06-12 DIAGNOSIS — I73.9 PVD (PERIPHERAL VASCULAR DISEASE) (H): ICD-10-CM

## 2019-06-12 DIAGNOSIS — I73.9 PAD (PERIPHERAL ARTERY DISEASE) (H): Primary | ICD-10-CM

## 2019-06-12 DIAGNOSIS — E11.621 TYPE 2 DIABETES MELLITUS WITH FOOT ULCER, UNSPECIFIED WHETHER LONG TERM INSULIN USE (H): ICD-10-CM

## 2019-06-12 DIAGNOSIS — L97.522 SKIN ULCER OF TOE OF LEFT FOOT WITH FAT LAYER EXPOSED (H): ICD-10-CM

## 2019-06-12 DIAGNOSIS — L97.511 SKIN ULCER OF TOE OF RIGHT FOOT, LIMITED TO BREAKDOWN OF SKIN (H): ICD-10-CM

## 2019-06-12 PROBLEM — R42 VERTIGO AS LATE EFFECT OF STROKE: Status: ACTIVE | Noted: 2019-06-12

## 2019-06-12 PROBLEM — I61.9 HEMORRHAGIC STROKE (H): Status: ACTIVE | Noted: 2017-04-18

## 2019-06-12 PROBLEM — K92.2 ACUTE UPPER GI BLEED: Status: ACTIVE | Noted: 2019-06-12

## 2019-06-12 PROBLEM — K22.2 GERD WITH STRICTURE: Status: ACTIVE | Noted: 2017-04-18

## 2019-06-12 PROBLEM — G40.309 NONINTRACTABLE GENERALIZED IDIOPATHIC EPILEPSY WITHOUT STATUS EPILEPTICUS (H): Status: ACTIVE | Noted: 2017-04-18

## 2019-06-12 PROBLEM — I25.10 ASHD (ARTERIOSCLEROTIC HEART DISEASE): Status: ACTIVE | Noted: 2017-04-18

## 2019-06-12 PROBLEM — I69.398 VERTIGO AS LATE EFFECT OF STROKE: Status: ACTIVE | Noted: 2019-06-12

## 2019-06-12 PROBLEM — R26.89 IMBALANCE: Status: ACTIVE | Noted: 2019-06-12

## 2019-06-12 PROBLEM — G51.0 CRANIAL NERVE VII PALSY: Status: ACTIVE | Noted: 2019-06-12

## 2019-06-12 PROBLEM — Z89.411 STATUS POST AMPUTATION OF RIGHT GREAT TOE (H): Status: ACTIVE | Noted: 2017-04-24

## 2019-06-12 PROBLEM — E03.9 ACQUIRED HYPOTHYROIDISM: Status: ACTIVE | Noted: 2017-04-18

## 2019-06-12 PROBLEM — K21.9 GERD WITH STRICTURE: Status: ACTIVE | Noted: 2017-04-18

## 2019-06-12 PROCEDURE — A6261 WOUND FILLER GEL/PASTE /OZ: HCPCS

## 2019-06-12 PROCEDURE — G0463 HOSPITAL OUTPT CLINIC VISIT: HCPCS | Mod: 25

## 2019-06-12 PROCEDURE — 11042 DBRDMT SUBQ TIS 1ST 20SQCM/<: CPT | Performed by: PHYSICIAN ASSISTANT

## 2019-06-12 PROCEDURE — 11042 DBRDMT SUBQ TIS 1ST 20SQCM/<: CPT

## 2019-06-12 PROCEDURE — 99203 OFFICE O/P NEW LOW 30 MIN: CPT | Mod: 25 | Performed by: PHYSICIAN ASSISTANT

## 2019-06-12 RX ORDER — NORTRIPTYLINE HYDROCHLORIDE 50 MG/1
50 CAPSULE ORAL
Status: ON HOLD | COMMUNITY
Start: 2011-09-28 | End: 2019-07-17

## 2019-06-12 RX ORDER — ATORVASTATIN CALCIUM 80 MG/1
80 TABLET, FILM COATED ORAL DAILY
COMMUNITY
Start: 2019-05-06

## 2019-06-12 RX ORDER — WARFARIN SODIUM 7.5 MG/1
7.5 TABLET ORAL DAILY
COMMUNITY
Start: 2011-09-28

## 2019-06-12 RX ORDER — BISACODYL 10 MG
10 SUPPOSITORY, RECTAL RECTAL
Status: ON HOLD | COMMUNITY
End: 2019-07-17

## 2019-06-12 RX ORDER — ACETAMINOPHEN 325 MG/1
650 TABLET ORAL
Status: ON HOLD | COMMUNITY
Start: 2018-03-30 | End: 2019-11-05

## 2019-06-12 RX ORDER — MIRTAZAPINE 7.5 MG/1
7.5 TABLET, FILM COATED ORAL
Status: ON HOLD | COMMUNITY
Start: 2018-03-30 | End: 2019-07-17

## 2019-06-12 RX ORDER — LISINOPRIL 10 MG/1
10 TABLET ORAL DAILY
COMMUNITY
Start: 2011-09-28

## 2019-06-12 RX ORDER — ROSUVASTATIN CALCIUM 20 MG/1
20 TABLET, COATED ORAL
Status: ON HOLD | COMMUNITY
Start: 2011-09-28 | End: 2019-07-17

## 2019-06-12 RX ORDER — LEVOTHYROXINE SODIUM 25 UG/1
25 TABLET ORAL DAILY
COMMUNITY
Start: 2018-11-01

## 2019-06-12 RX ORDER — OXYBUTYNIN CHLORIDE 15 MG/1
15 TABLET, EXTENDED RELEASE ORAL
Status: ON HOLD | COMMUNITY
End: 2019-07-17

## 2019-06-12 RX ORDER — MULTIVITAMIN
1 TABLET ORAL DAILY
COMMUNITY
Start: 2018-11-01

## 2019-06-12 RX ORDER — CLONAZEPAM 1 MG/1
1 TABLET ORAL
Status: ON HOLD | COMMUNITY
Start: 2011-09-28 | End: 2019-07-17

## 2019-06-12 RX ORDER — POLYETHYLENE GLYCOL 3350 17 G/17G
17 POWDER, FOR SOLUTION ORAL DAILY PRN
COMMUNITY
Start: 2018-08-23

## 2019-06-12 RX ORDER — TRAMADOL HYDROCHLORIDE 50 MG/1
50-100 TABLET ORAL
Status: ON HOLD | COMMUNITY
Start: 2017-04-21 | End: 2019-07-17

## 2019-06-12 RX ORDER — AMLODIPINE BESYLATE 10 MG/1
10 TABLET ORAL DAILY
COMMUNITY
Start: 2016-11-14

## 2019-06-12 RX ORDER — METOPROLOL TARTRATE 100 MG
100 TABLET ORAL 2 TIMES DAILY
COMMUNITY
Start: 2016-11-14

## 2019-06-12 RX ORDER — QUETIAPINE FUMARATE 25 MG/1
25 TABLET, FILM COATED ORAL
Status: ON HOLD | COMMUNITY
Start: 2016-11-14 | End: 2019-07-17

## 2019-06-12 RX ORDER — CITALOPRAM HYDROBROMIDE 20 MG/1
20 TABLET ORAL
Status: ON HOLD | COMMUNITY
Start: 2011-09-28 | End: 2019-07-17

## 2019-06-12 RX ORDER — WARFARIN SODIUM 5 MG/1
5 TABLET ORAL
Status: ON HOLD | COMMUNITY
Start: 2018-03-05 | End: 2019-07-17

## 2019-06-12 ASSESSMENT — MIFFLIN-ST. JEOR: SCORE: 1662.91

## 2019-06-12 NOTE — PROGRESS NOTES
[facility]    HISTORY OF PRESENT ILLNESS: Chance Shrestha is an 78 year old male who presents today for a left great toe wound. He believes it started after a fall, but patient is somewhat of a poor historian.     History of PAD and [place] Had significant complications from the procedure and is unsure whether the procedure was successful. Has not reestablished with vascular surgery in [place].    Has significant peripheral neuropathy and very little sensation in this foot. Wears a brace on his left leg for deficits due to a CVA. Unsure where the brace came from but it does not appear to be a custom device.    CURRENT/PREVIOUS DRESSING: medihoney    OFFLOADING: none - standard street shoe    BARRIERS TO HEALING: DM2, neuropathy, abnormal gait, PAD    ADHERENCE TO PLAN OF CARE: unknown as this is initial visit, will be assessed at following appointments    DATE WOUND ACQUIRED: late May 2019    REVIEW OF SYSTEMS:  CONSTITUTIONAL: Denies fevers or acute illness  ENDOCRINE: diabetes is well controlled    PAST MEDICAL HISTORY:  has a past medical history of Constipation, Depression, DM type 2 (diabetes mellitus, type 2) (H), GERD (gastroesophageal reflux disease), HLD (hyperlipidemia), HTN (hypertension), Hypothyroid, Ischemic heart disease, Seizures (H), and Systolic CHF (H).    SOCIAL HISTORY: resides in assisted living facility with home care nursing  TOBACCO STATUS:  reports that he has never smoked. He has never used smokeless tobacco.    MEDICATIONS:   Current Outpatient Medications   Medication     acetaminophen (PHARBETOL) 325 MG tablet     amLODIPine (NORVASC) 10 MG tablet     atorvastatin (LIPITOR) 80 MG tablet     bisacodyl (DULCOLAX) 10 MG suppository     citalopram (CELEXA) 20 MG tablet     clonazePAM (KLONOPIN) 1 MG tablet     levothyroxine (SYNTHROID/LEVOTHROID) 25 MCG tablet     lisinopril (PRINIVIL/ZESTRIL) 20 MG tablet     metFORMIN  "(GLUCOPHAGE) 1000 MG tablet     metoprolol tartrate (LOPRESSOR) 100 MG tablet     mirtazapine (REMERON) 7.5 MG tablet     Multiple Vitamin (DAILY VITES) TABS     nortriptyline (PAMELOR) 50 MG capsule     order for DME     oxybutynin ER (DITROPAN XL) 15 MG 24 hr tablet     polyethylene glycol (MIRALAX/GLYCOLAX) powder     QUEtiapine (SEROQUEL) 25 MG tablet     rosuvastatin (CRESTOR) 20 MG tablet     traMADol (ULTRAM) 50 MG tablet     vitamin D3 (VITAMIN D3) 1000 units (25 mcg) tablet     warfarin (COUMADIN) 5 MG tablet     warfarin (COUMADIN) 7.5 MG tablet     No current facility-administered medications for this encounter.        VITALS: /71   Pulse 68   Temp 97.4  F (36.3  C) (Temporal)   Resp 18   Ht 1.854 m (6' 1\")   Wt 89.9 kg (198 lb 3.2 oz)   BMI 26.15 kg/m       PHYSICAL EXAM:  GENERAL: Patient is alert and oriented and in no acute distress  CV: unable to palpate pedal pulses  INTEGUMENTARY:   Wound (used by OP [facility] only) 06/12/19 0935 Left plantar 1 toe ulceration, arterial;pressure injury (Active)   Length (cm) 1.3 6/12/2019  9:00 AM   Width (cm) 0.9 6/12/2019  9:00 AM   Depth (cm) 0.2 6/12/2019  9:00 AM   Wound (cm^2) 1.17 cm^2 6/12/2019  9:00 AM   Wound Volume (cm^3) 0.23 cm^3 6/12/2019  9:00 AM   Dressing Appearance moist drainage 6/12/2019  9:00 AM   Drainage Characteristics/Odor serosanguineous 6/12/2019  9:00 AM   Drainage Amount moderate 6/12/2019  9:00 AM   Thickness/Stage full thickness 6/12/2019  9:00 AM   Base red/granulating;slough 6/12/2019  9:00 AM   Red (%), Wound Tissue Color 30 6/12/2019  9:00 AM   Yellow (%), Wound Tissue Color 70 6/12/2019  9:00 AM   Periwound intact 6/12/2019  9:00 AM   Periwound Temperature warm 6/12/2019  9:00 AM   Periwound Skin Turgor soft 6/12/2019  9:00 AM   Edges open 6/12/2019  9:00 AM   Care, Wound debrided 6/12/2019  9:00 AM         PROCEDURE: 4% topical lidocaine was applied to the wound by nursing staff. Patient was determined to be capable of " making their own medical decisions and informed consent was obtained. Using a 15 blade a surgical debridement was performed down to and including subcutaneous tissue of <20 cm. Hemostasis was achieved with pressure. The patient tolerated the procedure well.      ASSESSMENT:   1. Full-thickness neuropathic ulcer of left toe with fat-layer exposed    PLAN:   1. Wound care plan: dress with Iodosorb  2. Order for ABIs  3. Referral to [facility] to assess shoewear and brace    FOLLOW-UP: 3 weeks with vascular surgeon     MUNA DEL VALLE PA-C (DYKSTRA)

## 2019-06-12 NOTE — DISCHARGE INSTRUCTIONS
[facility]  [address], [place] [address]  Appointment Phone [phone] Nurse Advisors [phone]    Chance Shrestha      1940  [facility] Phone [phone] Fax [phone]  Wound Dressing Change:Left Great Toe  Cleanse wound with soap and water  Cover wound with Iodosorb gel on guaze  Cover wound with bandaid  Change dressing daily     Anali Parr PA-C. June 12, 2019    Call us at [phone] if you have any questions about your wounds, have redness or swelling around your wound, have a fever of 101 or greater or if you have any other problems or concerns. We answer the phone Monday through Friday 8 am to 4 pm, please leave a message as we check the voicemail frequently throughout the day.     Follow up with Provider - 3 weeks with Vascular Surgeon   Please call [facility] [phone] ([address] [place], [place]) to schedule your appointment if you have not heard from them in two business days.    Arrive 15 minutes early.  If you need to cancel or change the appointment please call [facility] [phone] ([phone] [place], [place])    [facility] [phone]  [facility] [phone]  [facility] [phone]  [facility],  [facility] [phone]    [facility]  [place]  Address  [address]  Phone  [phone]  [facility]  Address  [address]  Phone  [phone]  [place]  Address  [address]  Phone  [phone]  [place]  Address  [address]  Phone  [phone]  [place]  Address  [address]  Phone  [phone]

## 2019-06-12 NOTE — PROGRESS NOTES
Patient arrived for wound care visit. Certified Wound Care Nurse time spent evaluating patient record, completed a full evaluation and documented wound(s) & marty-wound skin; provided recommendation based on treatment plan. Applied dressing, reviewed discharge instructions, patient education, and discussed plan of care with appropriate medical team staff members and patient and/or family members.

## 2019-06-17 ENCOUNTER — HOSPITAL ENCOUNTER (OUTPATIENT)
Dept: ULTRASOUND IMAGING | Facility: CLINIC | Age: 79
Discharge: HOME | End: 2019-06-17
Attending: PHYSICIAN ASSISTANT | Admitting: PHYSICIAN ASSISTANT
Payer: MEDICARE

## 2019-06-17 DIAGNOSIS — L97.509 TYPE 2 DIABETES MELLITUS WITH FOOT ULCER, UNSPECIFIED WHETHER LONG TERM INSULIN USE (H): ICD-10-CM

## 2019-06-17 DIAGNOSIS — L97.511 SKIN ULCER OF TOE OF RIGHT FOOT, LIMITED TO BREAKDOWN OF SKIN (H): ICD-10-CM

## 2019-06-17 DIAGNOSIS — E11.621 TYPE 2 DIABETES MELLITUS WITH FOOT ULCER, UNSPECIFIED WHETHER LONG TERM INSULIN USE (H): ICD-10-CM

## 2019-06-17 DIAGNOSIS — I73.9 PVD (PERIPHERAL VASCULAR DISEASE) (H): ICD-10-CM

## 2019-06-17 PROCEDURE — 93922 UPR/L XTREMITY ART 2 LEVELS: CPT

## 2019-06-17 NOTE — ADDENDUM NOTE
Encounter addended by: Carmella Alejo RN on: 6/17/2019 8:07 AM   Actions taken: Charge Capture section accepted

## 2019-06-18 ENCOUNTER — RECORDS - HEALTHEAST (OUTPATIENT)
Dept: LAB | Facility: CLINIC | Age: 79
End: 2019-06-18

## 2019-06-18 LAB — INR PPP: 2.04 (ref 0.9–1.1)

## 2019-06-21 ENCOUNTER — MEDICAL CORRESPONDENCE (OUTPATIENT)
Dept: HEALTH INFORMATION MANAGEMENT | Facility: CLINIC | Age: 79
End: 2019-06-21

## 2019-06-21 DIAGNOSIS — E11.621 TYPE 2 DIABETES MELLITUS WITH FOOT ULCER, UNSPECIFIED WHETHER LONG TERM INSULIN USE (H): ICD-10-CM

## 2019-06-21 DIAGNOSIS — L97.509 TYPE 2 DIABETES MELLITUS WITH FOOT ULCER, UNSPECIFIED WHETHER LONG TERM INSULIN USE (H): ICD-10-CM

## 2019-06-21 DIAGNOSIS — L97.522 SKIN ULCER OF TOE OF LEFT FOOT WITH FAT LAYER EXPOSED (H): ICD-10-CM

## 2019-06-24 ENCOUNTER — HOSPITAL ENCOUNTER (OUTPATIENT)
Dept: WOUND CARE | Facility: CLINIC | Age: 79
Discharge: HOME | End: 2019-06-24
Attending: SURGERY | Admitting: SURGERY
Payer: MEDICARE

## 2019-06-24 DIAGNOSIS — L97.522 SKIN ULCER OF TOE OF LEFT FOOT WITH FAT LAYER EXPOSED (H): ICD-10-CM

## 2019-06-24 DIAGNOSIS — I73.9 PAD (PERIPHERAL ARTERY DISEASE) (H): ICD-10-CM

## 2019-06-24 PROCEDURE — 99214 OFFICE O/P EST MOD 30 MIN: CPT | Mod: 25 | Performed by: SURGERY

## 2019-06-24 PROCEDURE — 11042 DBRDMT SUBQ TIS 1ST 20SQCM/<: CPT

## 2019-06-24 PROCEDURE — 11042 DBRDMT SUBQ TIS 1ST 20SQCM/<: CPT | Performed by: SURGERY

## 2019-06-24 PROCEDURE — A6261 WOUND FILLER GEL/PASTE /OZ: HCPCS

## 2019-06-24 PROCEDURE — 93923 UPR/LXTR ART STDY 3+ LVLS: CPT

## 2019-06-24 RX ORDER — DOCUSATE SODIUM 100 MG/1
100 CAPSULE, LIQUID FILLED ORAL
Status: ON HOLD | COMMUNITY
Start: 2011-09-21 | End: 2019-07-17

## 2019-06-24 NOTE — PROGRESS NOTES
[facility] [place] Progress Note    Subject: Chance Shrestha is 78-year-old with some dementia living at facility who presented with a left posterior medial toe ulcer that likely began in May 2019 after a fall.    Significant medical history of type 2 diabetes on metformin, hyperlipidemia, hypertension, probable underlying PAD.    He has had aortic valve replacement and is on chronic Coumadin anticoagulation with a [place] valve and for intermittent atrial fibrillation.  Coronary bypass graft performed at this time using the calf greater saphenous vein from the left.      He has had a prior stroke that occurred in approximately 1994.  He thought this was due to a medication in his sinus medications causing marked hypertension.  And does have weakness and gait problems particularly involving the left side for which he wears an AFO brace in his tennis shoe.    History of right great toe amputation and partial second toe amputation 2016.  He had a CTA performed [place] at this time.  On 10/12/2016 there was a calcified aorta and iliacs noted but no significant disease.  The left SFA measured 2.5 mm in the popliteal 2 mm in the tibials were not visualized.    Angiogram on 11/17/2016 was performed.  No significant femoral-superficial femoral-popliteal disease.  Reports as the anterior tibial artery was angioplastied to recanalize successfully though the posterior tibial artery was completely occluded and could not be open.  No intervention on the left leg at that time.  Currently toe amputations shortly following this.  Both of these  healed over.  At this time he apparently fell and on his tube and had significant bleeding requiring 25 units of packed red blood cells and plasma.  Subarachnoid bleed noted on CT treated conservatively.      Ankle-brachial index was performed on 6/17/2019 following his visit.  All distal pressures were monophasic.  Index was 1.17 on the right and 1.70 on the left  implying noncompressible arteries particular in the left    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)     PVD (peripheral vascular disease) (H)     SAH (subarachnoid hemorrhage) (H)     Skin ulcer of toe of right foot, limited to breakdown of skin (H)     Status post amputation of right great toe (H)     Tobacco use     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)     Uncomplicated alcohol abuse     Vertigo as late effect of stroke     Warfarin anticoagulation     Skin ulcer of toe of left foot with fat layer exposed (H)     PAD (peripheral artery disease) (H)     Past Medical History:   Diagnosis Date     Constipation      Depression      DM type 2 (diabetes mellitus, type 2) (H)      GERD (gastroesophageal reflux disease)      HLD (hyperlipidemia)      HTN (hypertension)      Hypothyroid      Ischemic heart disease      Seizures (H)      Systolic CHF (H)      Exam:  There were no vitals taken for this visit.  Wound (used by OP [facility] only) 06/12/19 0935 Left plantar 1 toe ulceration, arterial;pressure injury (Active)   Length (cm) 1.3 6/24/2019 10:00 AM   Width (cm) 0.8 6/24/2019 10:00 AM   Depth (cm) 0.3 6/24/2019 10:00 AM   Wound (cm^2) 1.04 cm^2 6/24/2019 10:00 AM   Wound Volume (cm^3) 0.31 cm^3 6/24/2019 10:00 AM   Wound healing % 11.11 6/24/2019 10:00 AM   Dressing Appearance moist drainage  6/24/2019 10:00 AM   Drainage Characteristics/Odor serosanguineous 6/24/2019 10:00 AM   Drainage Amount moderate 6/24/2019 10:00 AM     Very pleasant talkative gentleman.  Paresis of his left arm and leg from the stroke.  AFO in his left shoe is not applying any pressure on the great toe ulcer.  Well-healed right great and partial second toe amputations.  No swelling in either leg.  No cellulitis.  HEENT= no obvious carotid bruits.  Chest= clear  Cardiovascular= slightly regular rate.  Aortic valve click appreciated  Abdomen= fairly thin, soft, nontender  +3 femoral pulses bilaterally.  No popliteal or pedal pulses palpable.  Monophasic bedside right anterior tibial signal with reversal and flow in the posterior tibial artery consistent with a proximal occlusion of the latter.  Monophasic signals of the left anterior tibial and posterior tibial artery that are quite dampened.  Left calf greater saphenous vein harvested.  Right calf greater saphenous vein appears to be patent.  Sensation intact to his feet.  Ulceration of the left medial great toe.  Surrounding skin is unremarkable.  Toe does not appear to be particularly ischemic.    Procedure:   Patient was determined to be capable of making their own medical decisions and informed consent was obtained. Topical anesthetic of 4% lidocaine was applied, debridement was performed using a #15 blade down to and including subcutaneous tissue.  Skin edges were debrided as was the base of the wound.  Does not go to the bone.  Bleeding was noted.  Tolerated this with minimal discomfort.  Bleeding controlled with light pressure. Patient tolerated procedure well.    Impression: #1.  Traumatic left medial great toe ulcer in a patient with underlying severe PAD.  Ulceration is stable.  We will continue with Iodosorb.                         #2.  Severe PAD in the left leg.  By the CTA report disease within the SFA and popliteal artery.  Tibial vessels were not well evaluated.   Obviously better blood supply would be helpful to heal the toe ulcer.  He has a relatively normal creatinine from 10/26/2018 at 1.10   he does need his Coumadin due to his aortic valve and thus I would like to perform a CTA to see if we could have a better idea what is going on his left leg before considering angiography and bridging with Lovenox, etc.   try to obtain this test before his next follow-up visit and this is been discussed with the patient.    Spent 30 minutes with patient today with over 50% counseling.    Plan: We will dress the wounds with Iodosorb left great toe ulcer.  Patient will return to the clinic in 2 weeks time    Alban Jones MD on 6/24/2019 at 10:49 AM

## 2019-06-24 NOTE — DISCHARGE INSTRUCTIONS
[facility]  [address] [facility] [address], [place] [address]  Appointment Phone [phone] Nurse Advisors [phone]    Chance Shrestha 1940    [facility] Phone [phone] Fax [phone]  Wound Dressing Change:Left Great Toe  Cleanse wound with soap and water  Cover wound with Iodosorb gel on guaze  Cover wound with bandaid  Change dressing daily    Your two options are to continue with dressings to see if the wound will heal on it's own or recommend getting an CTA (CT scan) to evaluate your blood supply, depending on how aggressive you want to be.     Dr. Alban Jones, June 24, 2019    Call us at [phone] if you have any questions about your wounds, have redness or  swelling around your wound, have a fever of 101 or greater or if you have any other  problems or concerns. We answer the phone Monday through Friday 8 am to 4 pm,  please leave a message as we check the voicemail frequently throughout the day.    Follow up with Dr. Jones in 2 weeks

## 2019-06-25 ENCOUNTER — RECORDS - HEALTHEAST (OUTPATIENT)
Dept: LAB | Facility: CLINIC | Age: 79
End: 2019-06-25

## 2019-06-25 LAB — INR PPP: 2.71 (ref 0.9–1.1)

## 2019-07-05 ENCOUNTER — HOSPITAL ENCOUNTER (OUTPATIENT)
Dept: CT IMAGING | Facility: CLINIC | Age: 79
Discharge: HOME | End: 2019-07-05
Attending: SURGERY | Admitting: SURGERY
Payer: MEDICARE

## 2019-07-05 DIAGNOSIS — L97.522 SKIN ULCER OF TOE OF LEFT FOOT WITH FAT LAYER EXPOSED (H): ICD-10-CM

## 2019-07-05 DIAGNOSIS — I73.9 PAD (PERIPHERAL ARTERY DISEASE) (H): ICD-10-CM

## 2019-07-05 LAB
CREAT BLD-MCNC: 1 MG/DL (ref 0.66–1.25)
GFR SERPL CREATININE-BSD FRML MDRD: 72 ML/MIN/{1.73_M2}

## 2019-07-05 PROCEDURE — 82565 ASSAY OF CREATININE: CPT

## 2019-07-05 PROCEDURE — 25000125 ZZHC RX 250: Performed by: SURGERY

## 2019-07-05 PROCEDURE — 25000128 H RX IP 250 OP 636: Performed by: SURGERY

## 2019-07-05 PROCEDURE — 75635 CT ANGIO ABDOMINAL ARTERIES: CPT

## 2019-07-05 RX ORDER — IOPAMIDOL 755 MG/ML
100 INJECTION, SOLUTION INTRAVASCULAR ONCE
Status: COMPLETED | OUTPATIENT
Start: 2019-07-05 | End: 2019-07-05

## 2019-07-05 RX ADMIN — IOPAMIDOL 100 ML: 755 INJECTION, SOLUTION INTRAVENOUS at 11:39

## 2019-07-05 RX ADMIN — SODIUM CHLORIDE 80 ML: 9 INJECTION, SOLUTION INTRAVENOUS at 11:39

## 2019-07-08 ENCOUNTER — HOSPITAL ENCOUNTER (OUTPATIENT)
Dept: WOUND CARE | Facility: CLINIC | Age: 79
Discharge: HOME | End: 2019-07-08
Attending: SURGERY | Admitting: SURGERY
Payer: MEDICARE

## 2019-07-08 ENCOUNTER — TELEPHONE (OUTPATIENT)
Dept: SURGERY | Facility: CLINIC | Age: 79
End: 2019-07-08

## 2019-07-08 VITALS
DIASTOLIC BLOOD PRESSURE: 86 MMHG | TEMPERATURE: 97.1 F | RESPIRATION RATE: 18 BRPM | HEART RATE: 71 BPM | SYSTOLIC BLOOD PRESSURE: 150 MMHG

## 2019-07-08 DIAGNOSIS — I73.9 PAD (PERIPHERAL ARTERY DISEASE) (H): ICD-10-CM

## 2019-07-08 DIAGNOSIS — Z79.01 WARFARIN ANTICOAGULATION: Primary | ICD-10-CM

## 2019-07-08 DIAGNOSIS — L97.522 SKIN ULCER OF TOE OF LEFT FOOT WITH FAT LAYER EXPOSED (H): ICD-10-CM

## 2019-07-08 PROCEDURE — 99213 OFFICE O/P EST LOW 20 MIN: CPT | Mod: 25 | Performed by: SURGERY

## 2019-07-08 PROCEDURE — A6261 WOUND FILLER GEL/PASTE /OZ: HCPCS

## 2019-07-08 PROCEDURE — 11042 DBRDMT SUBQ TIS 1ST 20SQCM/<: CPT | Performed by: SURGERY

## 2019-07-08 PROCEDURE — 11042 DBRDMT SUBQ TIS 1ST 20SQCM/<: CPT

## 2019-07-08 NOTE — TELEPHONE ENCOUNTER
RN called to ask if we had wound care orders for a wound on his leg. No orders for that site were sent.   No orders for any other wounds other than what is listed on the orders

## 2019-07-08 NOTE — PROGRESS NOTES
[facility] [place] Progress Note    Subject: Chance Shrestha returns for follow-up.  I originally saw him in 6/24/2019 with noted in epic.  He had a traumatic nonhealing left medial great toe ulcer along with an ulceration on his right great toe.    Recommended a CTA was performed on 7/5/2019 reviewed.  Calcification within the aorta and iliac arteries but no high-grade stenoses.  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  SMA is widely patent as is the left renal artery with a mild stenosis of the right renal artery.  Moderate to severe calcifications of the popliteal arteries noted bilaterally along with the distal left SFA.  Left tibial vessels are difficult to visualize due to the calcification.    As noted he had angiograms for his right foot ulcer with angioplasty on the right.  He had significant bleeding following the angiogram most likely from the puncture site.  He does have a cardiac bypass graft and a mechanical aortic valve is on chronic Coumadin anticoagulation.      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)     PVD (peripheral vascular disease) (H)     SAH (subarachnoid hemorrhage) (H)     Skin ulcer of toe of right foot, limited to breakdown of skin (H)     Status post amputation of right great toe (H)     Tobacco use     Type 2  diabetes mellitus with foot ulcer (H)     Uncomplicated alcohol abuse     Vertigo as late effect of stroke     Warfarin anticoagulation     Skin ulcer of toe of left foot with fat layer exposed (H)     PAD (peripheral artery disease) (H)     Past Medical History:   Diagnosis Date     Constipation      Depression      DM type 2 (diabetes mellitus, type 2) (H)      GERD (gastroesophageal reflux disease)      HLD (hyperlipidemia)      HTN (hypertension)      Hypothyroid      Ischemic heart disease      Seizures (H)      Systolic CHF (H)      Exam:  /86   Pulse 71   Temp 97.1  F (36.2  C) (Temporal)   Resp 18   Wound (used by [facility] only) 06/12/19 0935 Left plantar 1 toe ulceration, arterial;pressure injury (Active)   Length (cm) 1.1 7/8/2019 10:05 AM   Width (cm) 0.7 7/8/2019 10:05 AM   Depth (cm) 0.2 7/8/2019 10:05 AM   Wound (cm^2) 0.77 cm^2 7/8/2019 10:05 AM   Wound Volume (cm^3) 0.15 cm^3 7/8/2019 10:05 AM   Wound healing % 34.19 7/8/2019 10:05 AM   Dressing Appearance moist drainage 7/8/2019 10:05 AM   Drainage Characteristics/Odor serosanguineous 7/8/2019 10:05 AM   Drainage Amount moderate 7/8/2019 10:05 AM       Wound (used by [facility] only) 07/08/19 1011 Right 5 toe (Active)   Length (cm) 0.7 7/8/2019 10:05 AM   Width (cm) 0.6 7/8/2019 10:05 AM   Depth (cm) 0.1 7/8/2019 10:05 AM   Wound (cm^2) 0.42 cm^2 7/8/2019 10:05 AM   Wound Volume (cm^3) 0.04 cm^3 7/8/2019 10:05 AM   Dressing Appearance moist drainage 7/8/2019 10:05 AM   Drainage Characteristics/Odor serosanguineous 7/8/2019 10:05 AM   Drainage Amount moderate 7/8/2019 10:05 AM     Alert and appropriate.  Left medial great toe and dorsal right fifth toe ulcerations.  Mild amount of fibrin and slough.  Measurements as above.  No undermining.  No infection.    Procedure:   Patient was determined to be capable of making their own medical decisions and informed consent was obtained. Topical anesthetic of 4% lidocaine was applied, debridement was  performed using a #15 blade down to and including subcutaneous tissue.  All slough and fibrin and skin edges debrided on both ulcers.  We are seeing some granulation tissue and adequate bleeding.  Bleeding controlled with light pressure. Patient tolerated procedure well.    Impression: #1.  Left great and right fifth toe ulcerations.  Debrided today.  Continue with dressing changes.  Poor healing is partially related to his arterial insufficiency.            #2.  CTA findings reviewed with patient.  Celiac artery stenosis of no clinical concern.  Very concerned about the left distal SFA and popliteal calcified stenosis as being a reason he is not healing the toe ulcer.  Also some concerns on the right.  Thus would recommend an aortogram with runoffs with particular attention to the left side to see if this is amenable to angioplasty or whether a bypass graft would be necessary.  He will be necessary to hold his Coumadin for 3 full days prior to the procedure and bridge with Lovenox 80 mg twice daily for those 3 days prior to the angiogram and likely 3 days after.  We will know at the time of the angiogram whether these lesions are amenable to angioplasty or whether a bypass using his remainder either left thigh or right leg greater saphenous vein is feasible.  This was discussed at length with the patient today in her 15-minute visit.  We will schedule the angiogram in the near future.  Until then continue with wound care.    Plan: We will dress the wounds with above.  Patient will return to the clinic in 2 weeks time    Alban Jones MD on 7/8/2019 at 10:20 AM

## 2019-07-08 NOTE — DISCHARGE INSTRUCTIONS
[facility]  [address] [facility] [address], [place] [address]  Appointment Phone [phone] Nurse Advisors [phone]    Chance Shrestha 1940    [facility] Phone [phone] Fax [phone]    Wound Dressing Change:Left Great Toe and Right 5th toe  Cleanse wound with soap and water  Cover wound with Iodosorb gel on guaze  Cover wound with bandaid  Change dressing daily    Recommend an angiogram of both legs to check the blood flow in your legs. [facility] Radiology Dept will call you to make schedule this. Please stop your Coumadin 3 days prior to this and start your Lovenox shots twice a day to bridge your blood thinning levels    Dr. Alban Jones, July 8, 2019    Call us at [phone] if you have any questions about your wounds, have redness or  swelling around your wound, have a fever of 101 or greater or if you have any other  problems or concerns. We answer the phone Monday through Friday 8 am to 4 pm,  please leave a message as we check the voicemail frequently throughout the day.    Follow up with Dr. Jones in 2 weeks

## 2019-07-09 ENCOUNTER — TELEPHONE (OUTPATIENT)
Dept: SURGERY | Facility: CLINIC | Age: 79
End: 2019-07-09

## 2019-07-09 NOTE — TELEPHONE ENCOUNTER
Chrissy Vinson called from primary care office and requested most recent viist note. She first called Medical Records.   No KEILA on file. Referred her to KEIL[facility] and gave her the number.

## 2019-07-10 ENCOUNTER — TELEPHONE (OUTPATIENT)
Dept: WOUND CARE | Facility: CLINIC | Age: 79
End: 2019-07-10

## 2019-07-10 NOTE — TELEPHONE ENCOUNTER
Recieved call regarding lovenox injections. MD order is for BID and can not be substituted or changed.  Per Dr. Jones Patient can administer the second dose per day after being trained by home health care nurse. Rx for medication was sent to pharmacy on 7/08/19.He is to hold his Coumadin for 3 full days prior to the procedure and bridge with Lovenox 80 mg twice daily for those 3 days prior to the angiogram and 3 days after.  Faxed to [phone]

## 2019-07-11 ENCOUNTER — RECORDS - HEALTHEAST (OUTPATIENT)
Dept: LAB | Facility: CLINIC | Age: 79
End: 2019-07-11

## 2019-07-11 ENCOUNTER — TELEPHONE (OUTPATIENT)
Dept: SURGERY | Facility: CLINIC | Age: 79
End: 2019-07-11

## 2019-07-11 DIAGNOSIS — Z79.01 WARFARIN ANTICOAGULATION: ICD-10-CM

## 2019-07-11 DIAGNOSIS — I70.249 ATHEROSCLEROSIS OF BOTH LOWER EXTREMITIES WITH BILATERAL ULCERATION (H): Primary | ICD-10-CM

## 2019-07-11 DIAGNOSIS — I70.239 ATHEROSCLEROSIS OF BOTH LOWER EXTREMITIES WITH BILATERAL ULCERATION (H): Primary | ICD-10-CM

## 2019-07-11 DIAGNOSIS — I73.9 PAD (PERIPHERAL ARTERY DISEASE) (H): ICD-10-CM

## 2019-07-11 LAB — INR PPP: 3.95 (ref 0.9–1.1)

## 2019-07-11 NOTE — TELEPHONE ENCOUNTER
Confirmed with RN at assisted [facility]. That patient's orders for blood thinners and confirmed this also with Rachael MANN RN CWOCN, who spoke to her yesterday and also with the order that was written.   Contact [facility] for appointment for angiogram. , [phone].  RN confirmed understanding.

## 2019-07-13 ENCOUNTER — APPOINTMENT (OUTPATIENT)
Dept: CT IMAGING | Facility: CLINIC | Age: 79
End: 2019-07-13
Attending: EMERGENCY MEDICINE
Payer: MEDICARE

## 2019-07-13 ENCOUNTER — HOSPITAL ENCOUNTER (EMERGENCY)
Facility: CLINIC | Age: 79
Discharge: HOME | End: 2019-07-13
Attending: EMERGENCY MEDICINE | Admitting: EMERGENCY MEDICINE
Payer: MEDICARE

## 2019-07-13 VITALS
TEMPERATURE: 98.1 F | OXYGEN SATURATION: 95 % | DIASTOLIC BLOOD PRESSURE: 80 MMHG | RESPIRATION RATE: 16 BRPM | SYSTOLIC BLOOD PRESSURE: 154 MMHG | HEART RATE: 66 BPM

## 2019-07-13 DIAGNOSIS — S00.81XA ABRASION OF FACE, INITIAL ENCOUNTER: ICD-10-CM
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
## 2019-07-13 LAB
ALBUMIN UR-MCNC: 10 MG/DL
ANION GAP SERPL CALCULATED.3IONS-SCNC: 7 MMOL/L (ref 3–14)
APPEARANCE UR: CLEAR
BASOPHILS # BLD AUTO: 0 10E9/L (ref 0–0.2)
BASOPHILS NFR BLD AUTO: 0.4 %
BILIRUB UR QL STRIP: NEGATIVE
BUN SERPL-MCNC: 21 MG/DL (ref 7–30)
CALCIUM SERPL-MCNC: 9.1 MG/DL (ref 8.5–10.1)
CHLORIDE SERPL-SCNC: 103 MMOL/L (ref 94–109)
CO2 SERPL-SCNC: 27 MMOL/L (ref 20–32)
COLOR UR AUTO: YELLOW
CREAT SERPL-MCNC: 1.03 MG/DL (ref 0.66–1.25)
DIFFERENTIAL METHOD BLD: ABNORMAL
EOSINOPHIL # BLD AUTO: 0.1 10E9/L (ref 0–0.7)
EOSINOPHIL NFR BLD AUTO: 0.7 %
ERYTHROCYTE [DISTWIDTH] IN BLOOD BY AUTOMATED COUNT: 13.8 % (ref 10–15)
GFR SERPL CREATININE-BSD FRML MDRD: 68 ML/MIN/{1.73_M2}
GLUCOSE SERPL-MCNC: 208 MG/DL (ref 70–99)
GLUCOSE UR STRIP-MCNC: 30 MG/DL
HCT VFR BLD AUTO: 36.1 % (ref 40–53)
HGB BLD-MCNC: 12.3 G/DL (ref 13.3–17.7)
HGB UR QL STRIP: NEGATIVE
IMM GRANULOCYTES # BLD: 0 10E9/L (ref 0–0.4)
IMM GRANULOCYTES NFR BLD: 0.1 %
INR PPP: 3.59 (ref 0.86–1.14)
INTERPRETATION ECG - MUSE: NORMAL
KETONES UR STRIP-MCNC: NEGATIVE MG/DL
LEUKOCYTE ESTERASE UR QL STRIP: NEGATIVE
LYMPHOCYTES # BLD AUTO: 1.6 10E9/L (ref 0.8–5.3)
LYMPHOCYTES NFR BLD AUTO: 21.9 %
MCH RBC QN AUTO: 31.5 PG (ref 26.5–33)
MCHC RBC AUTO-ENTMCNC: 34.1 G/DL (ref 31.5–36.5)
MCV RBC AUTO: 93 FL (ref 78–100)
MONOCYTES # BLD AUTO: 0.7 10E9/L (ref 0–1.3)
MONOCYTES NFR BLD AUTO: 9 %
NEUTROPHILS # BLD AUTO: 4.9 10E9/L (ref 1.6–8.3)
NEUTROPHILS NFR BLD AUTO: 67.9 %
NITRATE UR QL: NEGATIVE
NRBC # BLD AUTO: 0 10*3/UL
NRBC BLD AUTO-RTO: 0 /100
PH UR STRIP: 6 PH (ref 5–7)
PLATELET # BLD AUTO: 264 10E9/L (ref 150–450)
POTASSIUM SERPL-SCNC: 4.2 MMOL/L (ref 3.4–5.3)
RBC # BLD AUTO: 3.9 10E12/L (ref 4.4–5.9)
RBC #/AREA URNS AUTO: 2 /HPF (ref 0–2)
SODIUM SERPL-SCNC: 137 MMOL/L (ref 133–144)
SOURCE: ABNORMAL
SP GR UR STRIP: 1.01 (ref 1–1.03)
UROBILINOGEN UR STRIP-MCNC: NORMAL MG/DL (ref 0–2)
WBC # BLD AUTO: 7.5 10E9/L (ref 4–11)
WBC #/AREA URNS AUTO: <1 /HPF (ref 0–5)

## 2019-07-13 PROCEDURE — 93005 ELECTROCARDIOGRAM TRACING: CPT

## 2019-07-13 PROCEDURE — 90471 IMMUNIZATION ADMIN: CPT

## 2019-07-13 PROCEDURE — 25000128 H RX IP 250 OP 636: Performed by: EMERGENCY MEDICINE

## 2019-07-13 PROCEDURE — 80048 BASIC METABOLIC PNL TOTAL CA: CPT | Performed by: EMERGENCY MEDICINE

## 2019-07-13 PROCEDURE — 85610 PROTHROMBIN TIME: CPT | Performed by: EMERGENCY MEDICINE

## 2019-07-13 PROCEDURE — 70450 CT HEAD/BRAIN W/O DYE: CPT

## 2019-07-13 PROCEDURE — 99285 EMERGENCY DEPT VISIT HI MDM: CPT | Mod: 25

## 2019-07-13 PROCEDURE — 81001 URINALYSIS AUTO W/SCOPE: CPT | Performed by: EMERGENCY MEDICINE

## 2019-07-13 PROCEDURE — 90715 TDAP VACCINE 7 YRS/> IM: CPT | Performed by: EMERGENCY MEDICINE

## 2019-07-13 PROCEDURE — 85025 COMPLETE CBC W/AUTO DIFF WBC: CPT | Performed by: EMERGENCY MEDICINE

## 2019-07-13 RX ADMIN — CLOSTRIDIUM TETANI TOXOID ANTIGEN (FORMALDEHYDE INACTIVATED), CORYNEBACTERIUM DIPHTHERIAE TOXOID ANTIGEN (FORMALDEHYDE INACTIVATED), BORDETELLA PERTUSSIS TOXOID ANTIGEN (GLUTARALDEHYDE INACTIVATED), BORDETELLA PERTUSSIS FILAMENTOUS HEMAGGLUTININ ANTIGEN (FORMALDEHYDE INACTIVATED), BORDETELLA PERTUSSIS PERTACTIN ANTIGEN, AND BORDETELLA PERTUSSIS FIMBRIAE 2/3 ANTIGEN 0.5 ML: 5; 2; 2.5; 5; 3; 5 INJECTION, SUSPENSION INTRAMUSCULAR at 09:22

## 2019-07-13 ASSESSMENT — ENCOUNTER SYMPTOMS
DYSURIA: 0
HEADACHES: 0
BACK PAIN: 0
SHORTNESS OF BREATH: 0
ABDOMINAL PAIN: 0

## 2019-07-13 NOTE — ED NOTES
Bed: ED19  Expected date:   Expected time:   Means of arrival:   Comments:  [place] 2 Fall head injury on Blood thinner 80 m

## 2019-07-13 NOTE — ED PROVIDER NOTES
"History     Chief Complaint:  Fall    HPI  Chance Shrestha is a 78 year old male that lives in an assisted living facility with a history of diabetes, stroke with subsequent left-sided deficits, arteriosclerotic heart disease, and atrial fibrillation with RVR on Coumadin, who presents to the emergency department today for evaluation of fall. The patient reports that he does not remember going to sleep last night or falling. He states that he woke up on the floor and used a wheelchair in his bedroom to stand and get back into his bed. When the patient woke up, he went downstairs and his injuries were seen at which time EMS was called and the patient was transported here. The patient denies any current pain. The patient also voices additional concern for a \"smoky odor\" in his urine and states he would like to have his urine tested. He denies dysuria.     Allergies:  Oxycodone  Codeine    Medications:    Pharbetol  Norvasc  Aspirin   Lipitor  Dulcolax  Celexa  Klonopin  Colace  Lovenox  Levothyroxine   Lisinopril   Glucophage  Lopressor  Remeron  Pamelor  Ditropan XL  Miralax/glycolax  Seroquel  Crestor  Ultram  Coumadin    Past Medical History:    Constipation  Depression  Diabetes mellitus, type 2  Gastroesophageal reflux disease  Hyperlipidemia  Ischemic heart disease  Seizures   Systolic CHF   Accelerated hypertension  Acquired hypothyroidism  Acute blood loss anemia  Acute osteomyelitis of right foot  Acute upper GI bleed  Arteriosclerotic heart disease  Candida UTI  Atrial fibrillation with RVR   Cranial nerve VII palsy  Cognitive disorder  Gastrointestinal hemorrhage associated with duodenal ulcer  Mechanical aortic valve   Hemorrhagic stroke   Hypokalemia  Imbalance  Intramuscular hematoma  Left spastic hemiparesis   Vertigo as late effect of stroke    Past Surgical History:    Coronary artery bypass graft  Dale teeth extraction        Wave lithotripsy   Cholecystectomy       Neck cyst lesion mass excision "   Right total hip arthroplasty  Lumbar laminectomy    Stone removal right kidney    Aortic valve replacement   Right hernia incisional repair    Esophagogastroduodenoscopy   Orbital atherectomy and pta of the right popliteal    Toe amputation       Family History:    The patient's family history includes diabetes in his brother and sister; dementia in his father; respiratory disease in his mother.     Social History:  The patient reports that he has never smoked. He has never used smokeless tobacco. He reports that he does not drink alcohol or use drugs.   PCP: Ernie Shrestha    Review of Systems   Respiratory: Negative for shortness of breath.    Cardiovascular: Negative for chest pain.   Gastrointestinal: Negative for abdominal pain.   Genitourinary: Negative for dysuria.   Musculoskeletal: Negative for back pain.   Neurological: Negative for syncope and headaches.   10 point review of systems was obtained and negative other than mentioned above.    Physical Exam     Patient Vitals for the past 24 hrs:   BP Temp Temp src Pulse Resp SpO2   07/13/19 0954 154/80 -- -- 66 -- 95 %   07/13/19 0901 (!) 168/95 98.1  F (36.7  C) Oral 64 16 96 %     Physical Exam  General: Resting comfortably on the gurney  Eyes:  The pupils are equal and round    Conjunctivae and sclerae are normal  ENT:    Abrasions on forehead and nose  Neck:  Normal range of motion, no midline neck pain  CV:  Regular rate and rhythm    Skin warm and well perfused   Resp:  Lungs are clear    Non-labored    No rales    No wheezing   GI:  Abdomen is soft, there is no rigidity    No distension    No rebound tenderness     No abdominal tenderness  MS:  Ecchymosis and abrasions on right arm. Non tender UE/LE. Non tender back  Skin:  Abrasions on bilateral knees  Neuro:   Awake, alert.      GCS 15    Speech is normal and fluent.    Face is symmetric.     Moves all extremities equally  Psych: Normal affect.  Appropriate interactions.    Emergency Department  Course     ECG:  ECG taken at 0908, ECG read at 0918  Sinus rhythm with 1st degree AV block  Left axis deviation  Minimal voltage criteria for LVH, may be normal variant  T wave abnormality, consider lateral ischemia  Abnormal ECG  Rate 64 bpm. WI interval 304 ms. QRS duration 108 ms. QT/QTc 424/437 ms. P-R-T axes 45 -56 100.    Imaging:  Radiology findings were communicated with the patient who voiced understanding of the findings.    CT Head w/o Contrast  1. No evidence of acute intracranial hemorrhage, mass, or herniation.  2. Probable chronic area of ischemia with surrounding gliosis in the  right frontal white matter.  3. Marked diffuse parenchymal volume loss and white matter changes  likely due to chronic microvascular ischemic disease.  ESTHER FLOOD MD  Reading per radiology    Laboratory:  Laboratory findings were communicated with the patient who voiced understanding of the findings.    CBC: WBC 7.5, HGB 12.3 (L),   BMP: Glucose 208 (H) o/w WNL (Creatinine 1.03)  INR: 3.59 (H)  UA with Microscopic: Color Yellow, Appearance Clear, Glucose 30 (A), Protein Albumin 10 (A), o/w negative or WNL    Interventions:  0922 Tdap 0.5 mL Intramuscular    Emergency Department Course:    0855 Nursing notes and vitals reviewed.    0900 I performed a physical examination of the patient as documented above.    0908 EKG obtained as noted above.    0912 The patient provided a urine sample here in the emergency department. This was sent for laboratory testing, findings above.    0917 IV was inserted and blood was drawn for laboratory testing, results above.    1001 The patient was sent for a CT Head w/o Contrast while in the emergency department, results above.     1017 I updated the patient.     1046 I personally reviewed the laboratory and imaging results with the patient and answered all related questions prior to discharge.    Impression & Plan      Medical Decision Making:  Chance Shrestha is a 78 year old male  who presents to the emergency department today for evaluation of fall.  Patient with likely mechanical fall last night though circumstances somewhat unclear. Is on Coumadin for atrial fibrillation and a valve replacement. He has abrasions on his face so obtained head CT which did not show acute injuries. No indication for imaging of cervical spine. Requesting urinalysis be obtained which is negative. Blood work except for his INR being elevated (recommended level for him is 2.5-3.5) are unremarkable. Has abrasions and ecchymosis on several different areas of his body but he is not having any pain over this area that are requiring imaging. No need for laceration repair. Ambulatory in ED at his baseline with walker. Brother will bring patient back home.    Diagnosis:    ICD-10-CM    1. Fall, initial encounter W19.XXXA    2. Abrasion of face, initial encounter S00.81XA      Disposition:   The patient is discharged to home.    Discharge Medications:  No discharge medications.      Scribe Disclosure:  Jair MASTERSON, am serving as a scribe at 8:55 AM on 7/13/2019 to document services personally performed by Lashay Abreu MD based on my observations and the provider's statements to me.     EMERGENCY DEPARTMENT         Lashay Abreu MD  07/13/19 9346

## 2019-07-13 NOTE — ED TRIAGE NOTES
Pt fell out of bed around 0000 7/13/19. Hit head according to pt. Denies headache, n/v. Abrasion present on nose and forehead, left knee. Lives in assisted living. Taking coumadin.

## 2019-07-13 NOTE — DISCHARGE INSTRUCTIONS
No UTI today  If having pain, can ice areas that are sore  Tylenol for pain as needed  INR today was 3.59

## 2019-07-13 NOTE — ED AVS SNAPSHOT
Emergency Department  [address] [place] [address]  Phone:  [phone]  Fax:  [phone]                                    Chance Shrestha   MRN: [id number]    Department:   Emergency Department   Date of Visit:  7/13/2019           After Visit Summary Signature Page    I have received my discharge instructions, and my questions have been answered. I have discussed any challenges I see with this plan with the nurse or doctor.    ..........................................................................................................................................  Patient/Patient Representative Signature      ..........................................................................................................................................  Patient Representative Print Name and Relationship to Patient    ..................................................               ................................................  Date                                   Time    ..........................................................................................................................................  Reviewed by Signature/Title    ...................................................              ..............................................  Date                                               Time          22EPIC Rev 08/18

## 2019-07-15 ENCOUNTER — TELEPHONE (OUTPATIENT)
Dept: OTHER | Facility: CLINIC | Age: 79
End: 2019-07-15

## 2019-07-15 ENCOUNTER — TRANSFERRED RECORDS (OUTPATIENT)
Dept: HEALTH INFORMATION MANAGEMENT | Facility: CLINIC | Age: 79
End: 2019-07-15

## 2019-07-15 NOTE — TELEPHONE ENCOUNTER
Type of surgery: BILATERAL LOWER EXTREMITY ANGIOGRAM WITH POSSIBLE INTERVENTION  Location of surgery: [facility]  Date and time of surgery: 07/17/19 @11:30AM  Surgeon: DR. LANIER   Pre-Op Appt Date: PT TO SCHEDULE AT [facility] AT [facility]  Post-Op Appt Date: PT TO SCHEDULE   Packet sent out: Faxed to Kayla at [facility] on July 11 [phone]. mailed to patient on July 11  Pre-cert/Authorization completed:  Yes  Date: 07/15/19

## 2019-07-16 ENCOUNTER — TELEPHONE (OUTPATIENT)
Dept: OTHER | Facility: CLINIC | Age: 79
End: 2019-07-16

## 2019-07-16 NOTE — TELEPHONE ENCOUNTER
Purnima from [facility] called to report pt cleared for angiogram, however wanted to inform us pt had unwitnessed fall resulting in multiple bruises/scrabes/scabs on face and body. She reports pts CT was negative.     Pre Op recs received via fax, Dr. Boogie performing angiogram tomorrow.     Med recs given to IR CRISTIAN Izaguirre.     Lolly Ingram, BSN, RN

## 2019-07-17 ENCOUNTER — HOSPITAL ENCOUNTER (OUTPATIENT)
Facility: CLINIC | Age: 79
Discharge: HOME | End: 2019-07-18
Attending: RADIOLOGY | Admitting: RADIOLOGY
Payer: MEDICARE

## 2019-07-17 ENCOUNTER — APPOINTMENT (OUTPATIENT)
Dept: INTERVENTIONAL RADIOLOGY/VASCULAR | Facility: CLINIC | Age: 79
End: 2019-07-17
Attending: SURGERY
Payer: MEDICARE

## 2019-07-17 ENCOUNTER — RECORDS - HEALTHEAST (OUTPATIENT)
Dept: LAB | Facility: CLINIC | Age: 79
End: 2019-07-17

## 2019-07-17 DIAGNOSIS — L97.509 TYPE 2 DIABETES MELLITUS WITH FOOT ULCER, WITHOUT LONG-TERM CURRENT USE OF INSULIN (H): ICD-10-CM

## 2019-07-17 DIAGNOSIS — I70.239 ATHEROSCLEROSIS OF BOTH LOWER EXTREMITIES WITH BILATERAL ULCERATION (H): ICD-10-CM

## 2019-07-17 DIAGNOSIS — E78.5 HYPERLIPIDEMIA LDL GOAL <70: Primary | ICD-10-CM

## 2019-07-17 DIAGNOSIS — I70.249 ATHEROSCLEROSIS OF BOTH LOWER EXTREMITIES WITH BILATERAL ULCERATION (H): ICD-10-CM

## 2019-07-17 DIAGNOSIS — E11.621 TYPE 2 DIABETES MELLITUS WITH FOOT ULCER, WITHOUT LONG-TERM CURRENT USE OF INSULIN (H): ICD-10-CM

## 2019-07-17 LAB
ALBUMIN UR-MCNC: ABNORMAL MG/DL
APPEARANCE UR: CLEAR
APTT PPP: 42 SEC (ref 22–37)
BACTERIA #/AREA URNS HPF: ABNORMAL HPF
BILIRUB UR QL STRIP: NEGATIVE
CHOLEST SERPL-MCNC: 167 MG/DL
COLOR UR AUTO: YELLOW
CREAT SERPL-MCNC: 1.07 MG/DL (ref 0.66–1.25)
ERYTHROCYTE [DISTWIDTH] IN BLOOD BY AUTOMATED COUNT: 13.6 % (ref 10–15)
GFR SERPL CREATININE-BSD FRML MDRD: 65 ML/MIN/{1.73_M2}
GLUCOSE BLDC GLUCOMTR-MCNC: 205 MG/DL (ref 70–99)
GLUCOSE BLDC GLUCOMTR-MCNC: 249 MG/DL (ref 70–99)
GLUCOSE UR STRIP-MCNC: ABNORMAL MG/DL
HBA1C MFR BLD: 8.2 % (ref 0–5.6)
HCT VFR BLD AUTO: 34.1 % (ref 40–53)
HDLC SERPL-MCNC: 33 MG/DL
HGB BLD-MCNC: 11.5 G/DL (ref 13.3–17.7)
HGB UR QL STRIP: ABNORMAL
INR PPP: 1.33 (ref 0.86–1.14)
KETONES UR STRIP-MCNC: NEGATIVE MG/DL
LDLC SERPL CALC-MCNC: 88 MG/DL
LEUKOCYTE ESTERASE UR QL STRIP: NEGATIVE
MCH RBC QN AUTO: 31.2 PG (ref 26.5–33)
MCHC RBC AUTO-ENTMCNC: 33.7 G/DL (ref 31.5–36.5)
MCV RBC AUTO: 92 FL (ref 78–100)
MUCOUS THREADS #/AREA URNS LPF: ABNORMAL LPF
NITRATE UR QL: NEGATIVE
NONHDLC SERPL-MCNC: 134 MG/DL
PH UR STRIP: 6 [PH] (ref 4.5–8)
PLATELET # BLD AUTO: 277 10E9/L (ref 150–450)
RBC # BLD AUTO: 3.69 10E12/L (ref 4.4–5.9)
RBC #/AREA URNS AUTO: ABNORMAL HPF
SP GR UR STRIP: 1.01 (ref 1–1.03)
SQUAMOUS #/AREA URNS AUTO: ABNORMAL LPF
TRIGL SERPL-MCNC: 228 MG/DL
UROBILINOGEN UR STRIP-ACNC: ABNORMAL
WBC # BLD AUTO: 6.8 10E9/L (ref 4–11)
WBC #/AREA URNS AUTO: ABNORMAL HPF

## 2019-07-17 PROCEDURE — 27210743 ZZH CATH CR11

## 2019-07-17 PROCEDURE — 25500064 ZZH RX 255 OP 636: Performed by: RADIOLOGY

## 2019-07-17 PROCEDURE — 27210845 ZZH DEVICE INFLATION CR5

## 2019-07-17 PROCEDURE — 36415 COLL VENOUS BLD VENIPUNCTURE: CPT | Performed by: RADIOLOGY

## 2019-07-17 PROCEDURE — 25000132 ZZH RX MED GY IP 250 OP 250 PS 637: Mod: GY | Performed by: INTERNAL MEDICINE

## 2019-07-17 PROCEDURE — 82962 GLUCOSE BLOOD TEST: CPT

## 2019-07-17 PROCEDURE — 75716 ARTERY X-RAYS ARMS/LEGS: CPT | Mod: XU

## 2019-07-17 PROCEDURE — 25000125 ZZHC RX 250

## 2019-07-17 PROCEDURE — 25800030 ZZH RX IP 258 OP 636: Performed by: RADIOLOGY

## 2019-07-17 PROCEDURE — 27210805 ZZH SHEATH CR4

## 2019-07-17 PROCEDURE — 40000853 ZZH STATISTIC ANGIOGRAM, STENT, VERTEBRO PLASTY

## 2019-07-17 PROCEDURE — 85610 PROTHROMBIN TIME: CPT | Performed by: RADIOLOGY

## 2019-07-17 PROCEDURE — 25000132 ZZH RX MED GY IP 250 OP 250 PS 637: Mod: GY | Performed by: RADIOLOGY

## 2019-07-17 PROCEDURE — C1769 GUIDE WIRE: HCPCS

## 2019-07-17 PROCEDURE — 27210896 ZZH CATH CR9

## 2019-07-17 PROCEDURE — 25000128 H RX IP 250 OP 636

## 2019-07-17 PROCEDURE — 25000131 ZZH RX MED GY IP 250 OP 636 PS 637: Mod: GY | Performed by: INTERNAL MEDICINE

## 2019-07-17 PROCEDURE — 27210742 ZZH CATH CR1

## 2019-07-17 PROCEDURE — 85730 THROMBOPLASTIN TIME PARTIAL: CPT | Performed by: RADIOLOGY

## 2019-07-17 PROCEDURE — 27210738 ZZH ACCESSORY CR2

## 2019-07-17 PROCEDURE — 27210906 ZZH KIT CR8

## 2019-07-17 PROCEDURE — 40000935 ZZH STATISTIC OUTPATIENT (NON-OBS) EVE

## 2019-07-17 PROCEDURE — 27210914 ZZH SHEATH CR8

## 2019-07-17 PROCEDURE — 80061 LIPID PANEL: CPT | Performed by: RADIOLOGY

## 2019-07-17 PROCEDURE — 82565 ASSAY OF CREATININE: CPT | Performed by: RADIOLOGY

## 2019-07-17 PROCEDURE — 40000936 ZZH STATISTIC OUTPATIENT (NON-OBS) NIGHT

## 2019-07-17 PROCEDURE — 99222 1ST HOSP IP/OBS MODERATE 55: CPT | Performed by: INTERNAL MEDICINE

## 2019-07-17 PROCEDURE — 99204 OFFICE O/P NEW MOD 45 MIN: CPT | Performed by: INTERNAL MEDICINE

## 2019-07-17 PROCEDURE — 83036 HEMOGLOBIN GLYCOSYLATED A1C: CPT | Performed by: RADIOLOGY

## 2019-07-17 PROCEDURE — 99207 ZZC CDG-CODE CATEGORY CHANGED: CPT | Performed by: INTERNAL MEDICINE

## 2019-07-17 PROCEDURE — 85027 COMPLETE CBC AUTOMATED: CPT | Performed by: RADIOLOGY

## 2019-07-17 PROCEDURE — 25800030 ZZH RX IP 258 OP 636: Performed by: INTERNAL MEDICINE

## 2019-07-17 PROCEDURE — 27210886 ZZH ACCESSORY CR5

## 2019-07-17 PROCEDURE — 25000128 H RX IP 250 OP 636: Performed by: RADIOLOGY

## 2019-07-17 PROCEDURE — 99231 SBSQ HOSP IP/OBS SF/LOW 25: CPT | Performed by: SURGERY

## 2019-07-17 PROCEDURE — C1760 CLOSURE DEV, VASC: HCPCS

## 2019-07-17 RX ORDER — GLIMEPIRIDE 1 MG/1
1 TABLET ORAL
Qty: 90 TABLET | Refills: 1 | Status: SHIPPED | OUTPATIENT
Start: 2019-07-17

## 2019-07-17 RX ORDER — AMLODIPINE BESYLATE 10 MG/1
10 TABLET ORAL DAILY
Status: DISCONTINUED | OUTPATIENT
Start: 2019-07-18 | End: 2019-07-18 | Stop reason: HOSPADM

## 2019-07-17 RX ORDER — NALOXONE HYDROCHLORIDE 0.4 MG/ML
.1-.4 INJECTION, SOLUTION INTRAMUSCULAR; INTRAVENOUS; SUBCUTANEOUS
Status: DISCONTINUED | OUTPATIENT
Start: 2019-07-17 | End: 2019-07-17

## 2019-07-17 RX ORDER — VENLAFAXINE HYDROCHLORIDE 37.5 MG/1
37.5 CAPSULE, EXTENDED RELEASE ORAL EVERY EVENING
COMMUNITY

## 2019-07-17 RX ORDER — NICOTINE POLACRILEX 4 MG
15-30 LOZENGE BUCCAL
Status: DISCONTINUED | OUTPATIENT
Start: 2019-07-17 | End: 2019-07-17

## 2019-07-17 RX ORDER — HYDRALAZINE HYDROCHLORIDE 20 MG/ML
10 INJECTION INTRAMUSCULAR; INTRAVENOUS EVERY 4 HOURS PRN
Status: DISCONTINUED | OUTPATIENT
Start: 2019-07-17 | End: 2019-07-18 | Stop reason: HOSPADM

## 2019-07-17 RX ORDER — EZETIMIBE 10 MG/1
10 TABLET ORAL DAILY
Qty: 90 TABLET | Refills: 3 | Status: SHIPPED | OUTPATIENT
Start: 2019-07-17

## 2019-07-17 RX ORDER — IODIXANOL 320 MG/ML
150 INJECTION, SOLUTION INTRAVASCULAR ONCE
Status: COMPLETED | OUTPATIENT
Start: 2019-07-17 | End: 2019-07-17

## 2019-07-17 RX ORDER — MIRTAZAPINE 15 MG/1
15 TABLET, FILM COATED ORAL AT BEDTIME
COMMUNITY

## 2019-07-17 RX ORDER — DEXTROSE MONOHYDRATE 25 G/50ML
25-50 INJECTION, SOLUTION INTRAVENOUS
Status: DISCONTINUED | OUTPATIENT
Start: 2019-07-17 | End: 2019-07-18

## 2019-07-17 RX ORDER — DEXTROSE MONOHYDRATE 25 G/50ML
25-50 INJECTION, SOLUTION INTRAVENOUS
Status: DISCONTINUED | OUTPATIENT
Start: 2019-07-17 | End: 2019-07-17

## 2019-07-17 RX ORDER — VENLAFAXINE HYDROCHLORIDE 75 MG/1
75 CAPSULE, EXTENDED RELEASE ORAL EVERY EVENING
COMMUNITY

## 2019-07-17 RX ORDER — DEXTROSE MONOHYDRATE 25 G/50ML
25-50 INJECTION, SOLUTION INTRAVENOUS
Status: DISCONTINUED | OUTPATIENT
Start: 2019-07-17 | End: 2019-07-18 | Stop reason: HOSPADM

## 2019-07-17 RX ORDER — EZETIMIBE 10 MG/1
10 TABLET ORAL AT BEDTIME
Status: DISCONTINUED | OUTPATIENT
Start: 2019-07-17 | End: 2019-07-18 | Stop reason: HOSPADM

## 2019-07-17 RX ORDER — NICOTINE POLACRILEX 4 MG
15-30 LOZENGE BUCCAL
Status: DISCONTINUED | OUTPATIENT
Start: 2019-07-17 | End: 2019-07-18 | Stop reason: HOSPADM

## 2019-07-17 RX ORDER — LIDOCAINE 40 MG/G
CREAM TOPICAL
Status: DISCONTINUED | OUTPATIENT
Start: 2019-07-17 | End: 2019-07-18 | Stop reason: HOSPADM

## 2019-07-17 RX ORDER — HEPARIN SODIUM 1000 [USP'U]/ML
INJECTION, SOLUTION INTRAVENOUS; SUBCUTANEOUS
Status: COMPLETED
Start: 2019-07-17 | End: 2019-07-17

## 2019-07-17 RX ORDER — NALOXONE HYDROCHLORIDE 0.4 MG/ML
.1-.4 INJECTION, SOLUTION INTRAMUSCULAR; INTRAVENOUS; SUBCUTANEOUS
Status: DISCONTINUED | OUTPATIENT
Start: 2019-07-17 | End: 2019-07-18 | Stop reason: HOSPADM

## 2019-07-17 RX ORDER — FLUTICASONE PROPIONATE 50 MCG
1 SPRAY, SUSPENSION (ML) NASAL DAILY
COMMUNITY

## 2019-07-17 RX ORDER — HEPARIN SODIUM 1000 [USP'U]/ML
5000 INJECTION, SOLUTION INTRAVENOUS; SUBCUTANEOUS ONCE
Status: COMPLETED | OUTPATIENT
Start: 2019-07-17 | End: 2019-07-17

## 2019-07-17 RX ORDER — LIDOCAINE 40 MG/G
CREAM TOPICAL
Status: DISCONTINUED | OUTPATIENT
Start: 2019-07-17 | End: 2019-07-17

## 2019-07-17 RX ORDER — NICOTINE POLACRILEX 4 MG
15-30 LOZENGE BUCCAL
Status: DISCONTINUED | OUTPATIENT
Start: 2019-07-17 | End: 2019-07-18

## 2019-07-17 RX ORDER — FLUTICASONE PROPIONATE 50 MCG
1 SPRAY, SUSPENSION (ML) NASAL DAILY
Status: DISCONTINUED | OUTPATIENT
Start: 2019-07-18 | End: 2019-07-18 | Stop reason: HOSPADM

## 2019-07-17 RX ORDER — LEVOTHYROXINE SODIUM 25 UG/1
25 TABLET ORAL
Status: DISCONTINUED | OUTPATIENT
Start: 2019-07-18 | End: 2019-07-18 | Stop reason: HOSPADM

## 2019-07-17 RX ORDER — ATORVASTATIN CALCIUM 80 MG/1
80 TABLET, FILM COATED ORAL EVERY EVENING
Status: DISCONTINUED | OUTPATIENT
Start: 2019-07-17 | End: 2019-07-18 | Stop reason: HOSPADM

## 2019-07-17 RX ORDER — ACETAMINOPHEN 500 MG
500 TABLET ORAL EVERY 6 HOURS PRN
Status: DISCONTINUED | OUTPATIENT
Start: 2019-07-17 | End: 2019-07-18 | Stop reason: HOSPADM

## 2019-07-17 RX ORDER — MIRTAZAPINE 7.5 MG/1
7.5 TABLET, FILM COATED ORAL AT BEDTIME
Status: DISCONTINUED | OUTPATIENT
Start: 2019-07-17 | End: 2019-07-18 | Stop reason: HOSPADM

## 2019-07-17 RX ORDER — METOPROLOL TARTRATE 100 MG
100 TABLET ORAL 2 TIMES DAILY
Status: DISCONTINUED | OUTPATIENT
Start: 2019-07-17 | End: 2019-07-18 | Stop reason: HOSPADM

## 2019-07-17 RX ORDER — SODIUM CHLORIDE 9 MG/ML
INJECTION, SOLUTION INTRAVENOUS CONTINUOUS
Status: DISCONTINUED | OUTPATIENT
Start: 2019-07-17 | End: 2019-07-17

## 2019-07-17 RX ORDER — LISINOPRIL 20 MG/1
20 TABLET ORAL DAILY
Status: DISCONTINUED | OUTPATIENT
Start: 2019-07-18 | End: 2019-07-18 | Stop reason: HOSPADM

## 2019-07-17 RX ORDER — FENTANYL CITRATE 50 UG/ML
INJECTION, SOLUTION INTRAMUSCULAR; INTRAVENOUS
Status: COMPLETED
Start: 2019-07-17 | End: 2019-07-17

## 2019-07-17 RX ORDER — FENTANYL CITRATE 50 UG/ML
25-50 INJECTION, SOLUTION INTRAMUSCULAR; INTRAVENOUS EVERY 5 MIN PRN
Status: DISCONTINUED | OUTPATIENT
Start: 2019-07-17 | End: 2019-07-17

## 2019-07-17 RX ORDER — LIDOCAINE HYDROCHLORIDE 10 MG/ML
INJECTION, SOLUTION INFILTRATION; PERINEURAL
Status: COMPLETED
Start: 2019-07-17 | End: 2019-07-17

## 2019-07-17 RX ORDER — FLUMAZENIL 0.1 MG/ML
0.2 INJECTION, SOLUTION INTRAVENOUS
Status: DISCONTINUED | OUTPATIENT
Start: 2019-07-17 | End: 2019-07-17

## 2019-07-17 RX ORDER — GLIMEPIRIDE 1 MG/1
1 TABLET ORAL
Status: DISCONTINUED | OUTPATIENT
Start: 2019-07-18 | End: 2019-07-18 | Stop reason: HOSPADM

## 2019-07-17 RX ORDER — SODIUM CHLORIDE 9 MG/ML
INJECTION, SOLUTION INTRAVENOUS CONTINUOUS
Status: DISCONTINUED | OUTPATIENT
Start: 2019-07-17 | End: 2019-07-18 | Stop reason: HOSPADM

## 2019-07-17 RX ADMIN — INSULIN ASPART 3 UNITS: 100 INJECTION, SOLUTION INTRAVENOUS; SUBCUTANEOUS at 21:01

## 2019-07-17 RX ADMIN — ATORVASTATIN CALCIUM 80 MG: 80 TABLET, FILM COATED ORAL at 20:30

## 2019-07-17 RX ADMIN — FENTANYL CITRATE 25 MCG: 50 INJECTION INTRAMUSCULAR; INTRAVENOUS at 12:24

## 2019-07-17 RX ADMIN — MIDAZOLAM HYDROCHLORIDE 0.5 MG: 1 INJECTION, SOLUTION INTRAMUSCULAR; INTRAVENOUS at 12:24

## 2019-07-17 RX ADMIN — LIDOCAINE HYDROCHLORIDE 9 ML: 10 INJECTION, SOLUTION INFILTRATION; PERINEURAL at 14:10

## 2019-07-17 RX ADMIN — MIDAZOLAM HYDROCHLORIDE 0.5 MG: 1 INJECTION, SOLUTION INTRAMUSCULAR; INTRAVENOUS at 12:46

## 2019-07-17 RX ADMIN — WARFARIN SODIUM 7.5 MG: 2.5 TABLET ORAL at 22:42

## 2019-07-17 RX ADMIN — FENTANYL CITRATE 25 MCG: 50 INJECTION INTRAMUSCULAR; INTRAVENOUS at 12:47

## 2019-07-17 RX ADMIN — SODIUM CHLORIDE: 9 INJECTION, SOLUTION INTRAVENOUS at 10:37

## 2019-07-17 RX ADMIN — METOPROLOL TARTRATE 100 MG: 100 TABLET, FILM COATED ORAL at 20:30

## 2019-07-17 RX ADMIN — RANITIDINE 75 MG: 75 TABLET, COATED ORAL at 20:30

## 2019-07-17 RX ADMIN — MIRTAZAPINE 7.5 MG: 7.5 TABLET ORAL at 22:00

## 2019-07-17 RX ADMIN — FENTANYL CITRATE 25 MCG: 50 INJECTION INTRAMUSCULAR; INTRAVENOUS at 13:23

## 2019-07-17 RX ADMIN — MIDAZOLAM HYDROCHLORIDE 0.5 MG: 1 INJECTION, SOLUTION INTRAMUSCULAR; INTRAVENOUS at 13:23

## 2019-07-17 RX ADMIN — EZETIMIBE 10 MG: 10 TABLET ORAL at 22:00

## 2019-07-17 RX ADMIN — SODIUM CHLORIDE, PRESERVATIVE FREE: 5 INJECTION INTRAVENOUS at 22:43

## 2019-07-17 RX ADMIN — HEPARIN SODIUM 5000 UNITS: 1000 INJECTION, SOLUTION INTRAVENOUS; SUBCUTANEOUS at 13:00

## 2019-07-17 RX ADMIN — HEPARIN SODIUM 5000 UNITS: 1000 INJECTION INTRAVENOUS; SUBCUTANEOUS at 13:00

## 2019-07-17 RX ADMIN — IODIXANOL 70 ML: 320 INJECTION, SOLUTION INTRAVASCULAR at 14:01

## 2019-07-17 ASSESSMENT — MIFFLIN-ST. JEOR: SCORE: 1603.03

## 2019-07-17 NOTE — PROGRESS NOTES
[facility] Post-Procedure Note    Procedure: LE angiogram  CS arrival time: 1420  Accompanied by: IR staff RN Vanesa  Concerns/abnormal assessment after procedure: None  Plan: Home with friend Maximus in wheelchair

## 2019-07-17 NOTE — CONSULTS
[facility]  Consult Note - Hospitalist Service     Date of Admission:  7/17/2019  Consult Requested by: Dr Ana Boogie   Reason for Consult: Medical management    Assessment & Plan   Chance Shrestha is a 78 year old male with a history of peripheral arterial disease, AVR on chronic anticoagulation with Coumadin hypertension, type 2 diabetes, hyperlipidemia, gastroesophageal reflux disease, hypothyroidism, depression, history of stroke with left-sided residual weakness who is currently wheelchair-bound who has a chronic nonhealing wound, who underwent bilateral lower extremity angiogram with angioplasty of the left peroneal artery by IR 7/17/2019.  Hospitalist consulted for medical management.    Peripheral arterial disease  Chronic nonhealing ulcer  -Postprocedure care per vascular surgery and vascular medicine.  -The ulcer does not look infected so will hold off on antibiotics at this point.  -Adequate control of blood sugar, lipids and blood pressure for wound healing.    AVR on chronic anticoagulation with Coumadin  -INR goal 2.5-3.5, currently subtherapeutic preprocedure was getting Lovenox bridging,  - Coumadin has been restarted and patient to continue  Lovenox bridging starting tomorrow morning per vascular surgery    Essential Hypertension   -PTA on metoprolol, lisinopril and amlodipine  -Resume PTA antihypertensives with holding parameters  -PRN IV hydralazine  -Monitor and adjust medication as needed    Diabetes Mellitus type2, non-insulin Dependent  -PTA on metformin  -Hemoglobin A1c on 7/17/2019 was 8.2 suggesting poor diabetes control  -Being PTA metformin for recent exposure to contrast dye, can resume after 48 hours  -Will start on Amaryl per vascular medicine recommendation  -Sliding scale insulin  -Monitor and adjust medication as needed    Hyperlipidemia  -Resume PTA statin, add Zetia per vascular medicine recommendation    Gastroesophageal reflux disease  -PTA on  ranitidine, resume once verified by pharmacy    Depression  -Resume PTA medication once verified by pharmacy    Hypothyroidism  -Resume PTA Synthroid once verified by pharmacy    History of stroke with left-sided residual weakness    Recent fall:  -Does not endorse any chest pain, palpitation, shortness of breath.  However he did not recall the fall.  -Head CT done at that time was negative  -Will put him on telemetry    The patient's care was discussed with the Patient.    Martha Calles MD  [facility]    ______________________________________________________________________    Chief Complaint   Medical management    History is obtained from the patient and electronic health record    History of Present Illness   Chance Shrestha is a 78 year old male with a history of peripheral arterial disease, AVR on chronic anticoagulation with Coumadin hypertension, type 2 diabetes, hyperlipidemia, gastroesophageal reflux disease, depression, history of stroke with left-sided residual weakness who is currently wheelchair-bound who has a chronic nonhealing wound being followed by Dr. Jones who underwent bilateral lower extremity angiogram with angioplasty of the left peroneal artery by IR earlier today(7/17/2019).    Postprocedure patient reports doing well.  He denies any nausea vomiting, chest pain, shortness of breath, dizziness, lightheadedness, changes in vision.  Estimated blood loss was none.  He is tolerating oral intake adequately post procedure.  Denies any new complaints.    Patient reports that he had a fall 3 days back and was evaluated in the ED.  He does not remember going to sleep or falling that night.Had negative head CT     Review of Systems   The 10 point Review of Systems is negative other than noted in the HPI or here.     Past Medical History    I have reviewed this patient's medical history and updated it with pertinent information if needed.   Past Medical History:   Diagnosis Date      Constipation      Depression      DM type 2 (diabetes mellitus, type 2) (H)      GERD (gastroesophageal reflux disease)      HLD (hyperlipidemia)      HTN (hypertension)      Hypothyroid      Ischemic heart disease      Seizures (H)      Systolic CHF (H)        Past Surgical History   I have reviewed this patient's surgical history and updated it with pertinent information if needed.  Past Surgical History:   Procedure Laterality Date     ORTHOPEDIC SURGERY Right     Hip replacement       Social History   I have reviewed this patient's social history and updated it with pertinent information if needed.  Social History     Tobacco Use     Smoking status: Never Smoker     Smokeless tobacco: Never Used   Substance Use Topics     Alcohol use: No     Drug use: No       Family History   Family history was reviewed and is noncontributory    Medications   Medications Prior to Admission   Medication Sig Dispense Refill Last Dose     amLODIPine (NORVASC) 10 MG tablet Take 10 mg by mouth   7/17/2019 at 0800     atorvastatin (LIPITOR) 80 MG tablet Take 80 mg by mouth   7/17/2019 at 0800     enoxaparin (LOVENOX) 80 MG/0.8ML syringe INJECT 0.8 MLS (80 MG) SUBCUTANEOUS 2 TIMES DAILY 12 Syringe 0 7/17/2019 at 0830     fluticasone (FLONASE) 50 MCG/ACT nasal spray Spray 1 spray into both nostrils daily   7/17/2019 at 0800     levothyroxine (SYNTHROID/LEVOTHROID) 25 MCG tablet Take 25 mcg by mouth   7/17/2019 at 0800     lisinopril (PRINIVIL/ZESTRIL) 20 MG tablet Take 20 mg by mouth   7/17/2019 at 0800     metFORMIN (GLUCOPHAGE) 1000 MG tablet Take 1,000 mg by mouth   7/15/2019 at Unknown time     metoprolol tartrate (LOPRESSOR) 100 MG tablet Take 100 mg by mouth   7/17/2019 at 0800     mirtazapine (REMERON) 7.5 MG tablet Take 7.5 mg by mouth   7/16/2019 at Unknown time     Multiple Vitamin (DAILY VITES) TABS Take 1 tablet by mouth   7/17/2019 at 0800     polyethylene glycol (MIRALAX/GLYCOLAX) powder Take 17 g by mouth   Past  "Month at Unknown time     ranitidine (ZANTAC) 75 MG tablet Take 75 mg by mouth 2 times daily   7/17/2019 at 0800     vitamin D3 (VITAMIN D3) 1000 units (25 mcg) tablet Take 5,000 Units by mouth    7/17/2019 at 0800     acetaminophen (PHARBETOL) 325 MG tablet Take 650 mg by mouth   More than a month at Unknown time     order for [facility]  [place]:Phone: [phone] Fax: [phone]  [place]: Phone [phone] Fax [phone]  Please call Patient to schedule  Evaluate and Treat as indicated   Left Foot Modification to AFO or new AFO, non healing ulcer of left great toe and rubbing on left ankle 1 Device 0 Unknown at Unknown time     warfarin (COUMADIN) 7.5 MG tablet 7.5 mg   7/13/2019       Allergies   Allergies   Allergen Reactions     Oxycodone      Hallucinations.     Codeine Other (See Comments) and Itching     Gave him a \"skin crawling feeling\"  Gave him a \"skin crawling feeling\"         Physical Exam   Vital Signs: Temp: 98  F (36.7  C) Temp src: Oral BP: (!) 151/93 Pulse: 58 Heart Rate: 184 Resp: 16 SpO2: 94 % O2 Device: None (Room air) Oxygen Delivery: 2 LPM  Weight: 185 lbs 0 oz    Exam:  Constitutional: Awake, alert and no distress. Appears comfortable  Head: Normocephalic.  Multiple areas of bruising in his forehead/face.  No masses  ENT: ENT exam normal, no neck nodes or sinus tenderness  Cardiovascular: RRR.  No murmurs however mechanical valvular click heard  Respiratory: Normal WOB,b/l equal air entry, no wheezes or crackles   Gastrointestinal: Abdomen soft, non-tender. BS normal. No masses, organomegaly  : Deferred  Skin: Warm and dry to touch, no rash.  There is bruising on his abdominal skin from Lovenox injections  Extremities : No edema , no clubbing or cyanosis.  Chronic ulcer on the left toe.  Neurologic: Cranial nerves II-XII grossly intact , power left side weaker than right.Sensation grossly WNL.      Data   I personally reviewed the IR lower extremity " angiogram image(s) .  Results for orders placed or performed during the hospital encounter of 07/17/19 (from the past 24 hour(s))   CBC with platelets   Result Value Ref Range    WBC 6.8 4.0 - 11.0 10e9/L    RBC Count 3.69 (L) 4.4 - 5.9 10e12/L    Hemoglobin 11.5 (L) 13.3 - 17.7 g/dL    Hematocrit 34.1 (L) 40.0 - 53.0 %    MCV 92 78 - 100 fl    MCH 31.2 26.5 - 33.0 pg    MCHC 33.7 31.5 - 36.5 g/dL    RDW 13.6 10.0 - 15.0 %    Platelet Count 277 150 - 450 10e9/L   Hemoglobin A1c   Result Value Ref Range    Hemoglobin A1C 8.2 (H) 0 - 5.6 %   Creatinine   Result Value Ref Range    Creatinine 1.07 0.66 - 1.25 mg/dL    GFR Estimate 65 >60 mL/min/[1.73_m2]    GFR Estimate If Black 75 >60 mL/min/[1.73_m2]   Lipid panel   Result Value Ref Range    Cholesterol 167 <200 mg/dL    Triglycerides 228 (H) <150 mg/dL    HDL Cholesterol 33 (L) >39 mg/dL    LDL Cholesterol Calculated 88 <100 mg/dL    Non HDL Cholesterol 134 (H) <130 mg/dL   INR   Result Value Ref Range    INR 1.33 (H) 0.86 - 1.14   Partial thromboplastin time   Result Value Ref Range    PTT 42 (H) 22 - 37 sec   IR Lower Extremity Angiogram Bilateral    Narrative    PROCEDURE:   1. Ultrasound guided right femoral artery access.   2. Pelvic arteriography  3. Bilateral lower extremity arteriography   4. Superselective catheterization and angiography of the left peroneal  artery.  5. Angioplasty of the left peroneal artery with post angioplasty  angiography.    DATE OF PROCEDURE: 7/17/2019    MEDICATIONS:  9 mL 1% Lidocaine SQ, Versed 1.5 mg IV, Fentanyl 75 mcg  IV and 5000 units heparin IV.    CONTRAST: 70 mL Visipaque IA    FLUOROSCOPY TIME: 20.8 minutes    AIR KERMA: 102.3 mGy.    COMPLICATIONS: None    CLINICAL HISTORY/INDICATION: 78-year-old male with nonhealing left toe  wound. Severe atherosclerosis.    PROCEDURE AND FINDINGS:  Following a discussion of the risks, benefits, indications, and  alternatives to treatment, appropriate informed consent was  obtained  from the patient. The patient was brought to the interventional  radiology suite and placed supine on the table. Bilateral groins were  prepped and draped in a sterile fashion.  A timeout was performed per  universal protocol policy to confirm the correct patient, site and  procedure to be performed.    A preliminary ultrasound of the right  groin was performed and  demonstrates a patent right common femoral artery.  A permanent  ultrasound image was recorded.  Using a combination of fluoroscopy and  ultrasound, an access site was determined.  The overlying skin was  anesthetized with 1% Lidocaine.  Using ultrasound guidance,  access  into the right common femoral artery was obtained with visualization  of needle entry into the vessel. A 0.018 wire was advanced through the  needle and exchange was made for a 5 Marshallese micropuncture sheath. A  0.035 wire was advanced through the micropuncture sheath and exchange  was made for a 5 Marshallese vascular sheath.    A Bentson and 5 Marshallese Omni Flush catheter were inserted to just above  the level of the aortic bifurcation. Pelvic angiography was then  performed and demonstrates the distal abdominal aorta, common iliac,  external iliac and internal iliac arteries are patent. Bilateral  common femoral arteries are patent.    The left common iliac artery was then selected using a combination of  the flush catheter and J-wire.  This system was then advanced into the  left common femoral artery at the level of the femoral head. Serial  digital subtraction angiography was then performed proximal to distal  to include the SFA, popliteal artery, trifurcation, and vasculature of  the foot. These angiographic images demonstrate left common femoral  and profunda femoral arteries are patent. Mild stenosis of the distal  superficial femoral artery at the level of the abductor canal.  Popliteal artery is patent. There is single vessel runoff via the  peroneal artery which has  multifocal areas of severe stenosis.    The catheter was removed over a wire. The 5 Cuban vascular sheath was  removed over a wire and exchanged for a 6 Cuban vascular sheath which  was advanced over the aortic arch into the left common femoral artery.  Using a combination of catheter and wire, access was gained into the  distal left peroneal artery. The wire was removed. An image was  obtained which shows access into the distal peroneal artery.  Angioplasty was performed throughout the multi areas of severe  stenosis throughout the peroneal artery with a 2 x 40 mm balloon. Post  images show significant improvement of the appearance of the peroneal  artery. Catheter and wire were removed.    The 6 Cuban vascular sheath was pulled back into the right external  iliac artery. Right lower extremity angiography was performed via the  right vascular sheath. Right lower extremity angiogram shows the right  common femoral, profunda femoral and superficial femoral arteries are  patent. Mild multifocal stenosis of the popliteal artery. Anterior  tibial artery occludes in the proximal calf. Posterior tibial artery  occludes in the proximal calf. Single vessel runoff via the peroneal  artery.    At this point, the catheter was removed over the wire. A preclosure  digital subtraction angiographic run was performed to evaluate the  access site and adequate hemostasis was then obtained using  a 6  Cuban Angio-Seal closure device.     Throughout the procedure, the patient was monitored by a radiology  nurse for cardiac rhythm and oxygen saturation which remained stable.  Total moderate sedation time was 20.8 min. The patient tolerated the  procedure well and left interventional radiology in stable condition.      Impression    IMPRESSION:   1. Left lower extremity: Single vessel runoff via the peroneal artery.  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  2. Right lower extremity:  Single vessel runoff via the peroneal  artery.

## 2019-07-17 NOTE — CONSULTS
[facility]    Vascular Medicine Consultation     Date of Admission:  7/17/2019  Date of Consult (When I saw the patient): 07/17/19         Physician Supervisory Attestation:   I have reviewed and discussed with the physician assistant their history, physical and plan and independently interviewed and examined Chance Shrestha and agree with the plan as stated in the physician assistant note.    He underwent left lower extremity angiogram with angioplasty of the left peroneal artery today for critical limb ischemia of nonhealing left toe ulcers.  Poorly controlled type 2 diabetes mellitus A1c greater than 8 and he takes metformin  Poorly controlled lipids currently taking maximum dose of atorvastatin 80 mg daily  He was taking warfarin for  Aortic valve replacement and this was held and bridging with Lovenox before the procedure and last dose was this morning.  Groin access site looks good, Angio-Seal placed    Plan:  Follow post angiogram protocol  IV fluids  Monitor access site  Bedrest  Start warfarin same dose and monitor INR and maintain therapeutic range as before  Lovenox bridging starting tomorrow twice daily until INR therapeutic (he received last dose of Lovenox this morning)  Add Zetia 10 mg daily, continue atorvastatin 80 mg daily  Add Amaryl 1 mg daily and continue metformin but due to contrast dye load start in 48 hours.  Continue rest of the medications same  Repeat lipid panel and A1c in 3 months    Discussed with nursing staff    Copy of this dictation to Dr. Ana Boogie , Dr. Jones and primary MD      Joseph Alicia MD, [facility]  Vascular medicine service   7/17/2019          Assessment & Plan   1. Critical limb ischemia of a non-healing left toe ulcer s/p angiogram with angioplasty of the left peroneal artery 7/17/19    Post-procedure cares and follow-up per Dr. Jones. He can resume his warfarin with Lovenox bridging and should follow up closely with his  anticoagulation clinic.     2. Hyperlipidemia    His lipid panel this admission is significant for an LDL of 88, HDL 33, triglycerides 228, and total cholesterol 167 while on Atorvastatin 80 mg daily. Given his history of diabetes, CVA, CAD, and PAD it is highly recommended that he be treated slightly more aggressively to an LDL of less than 70. Therefore, would recommend adding Zetia 10 mg daily to his maximum dose Atorvastatin. He should then have a lipid panel repeated in 3 months to ascertain whether or not his lipids are at goal on this regimen. If his LDL is still above 70, he should follow up with Vascular Medicine.     3. Type 2 Diabetes Mellitus    His A1C this admission is noted to be elevated at 8.2 %. Again, given his pan vascular disease and for optimal wound healing, he should have tighter control of his sugars to a goal A1C of less than 7.0%. He is presently on Metformin only as an outpatient. It is recommended that he also initiate Amaryl 1 mg daily. He should then have an A1C repeated in 3 months through his primary care provider.       Reason for Consult   Reason for consult: Asked by Dr. Jones / Dr. Boogie (IR) to evaluate and help manage vascular risk factors in this 78 year old male on chronic anticoagulation for a mechanical aortic valve and history of CAD, CVA with residual left sided weakness, hyperlipidemia, diabetes, hypertension, and PAD now presenting for an angiogram for critical limb ischemia of a non-healing left foot ulcer.     Primary Care Physician   Ernie Shrestha      History of Present Illness   Chance Shrestha is a 78 year old male with some dementia living at a facility and known history of diabetes, hyperlipidemia, hypertension, CVA resulting left sided weakness for which he wears a left leg brace, [place] aortic valvae replacement on chronic warfarin, CABG, and PAD.     Back in 2016 he did undergo a right great toe amputation and partial second toe amputation. A CTA  performed in [place] at this time revealed a calcified aorta and iliacs but no significant disease.      On 11/17/2016 an angiogram was performed. There was no significant femoral-superficial femoral-popliteal disease. However, the anterior tibial artery was angioplastied to recanalize successfully but the posterior tibial artery was completely occluded and could not be opened. No intervention was done on the left leg at that time.     At this time he apparently fell and on his tube and had significant bleeding requiring 25 units of packed red blood cells and plasma. A subarachnoid bleed was noted on CT and treated conservatively.     He originally presented to Dr. Jones of Vascular Surgery with a left posterior medial toe ulcer that likely began in May 2019 after a fall.  Ankle-brachial index was performed on 6/17/2019 following his visit.  All distal pressures were monophasic.  Index was 1.17 on the right and 1.70 on the left implying noncompressible arteries particular in the left. A CTA was then obtained on 7/5/19. This was significant for calcification within the aorta and iliac arteries but no high-grade stenoses.  The celiac artery has a proximal  high-grade stenosis with poststenotic dilatation.  SMA is widely patent as is the left renal artery with a mild stenosis of the right renal artery.  Moderate to severe calcifications of the popliteal arteries were noted bilaterally along with the distal left SFA.  Left tibial vessels are difficult to visualize due to the calcification.    An angiogram was recommended, for which he presented today.  Angioplasty of the left peroneal artery was undertaken.       Past Medical History   Past Medical History:   Diagnosis Date     Constipation      Depression      DM type 2 (diabetes mellitus, type 2) (H)      GERD (gastroesophageal reflux disease)      HLD (hyperlipidemia)      HTN (hypertension)      Hypothyroid      Ischemic heart disease      Seizures (H)       Systolic CHF (H)        Past Surgical History   Past Surgical History:   Procedure Laterality Date     ORTHOPEDIC SURGERY Right     Hip replacement       Prior to Admission Medications   Prior to Admission Medications   Prescriptions Last Dose Informant Patient Reported? Taking?   Multiple Vitamin (DAILY VITES) TABS 7/17/2019 at 0800  Yes Yes   Sig: Take 1 tablet by mouth   acetaminophen (PHARBETOL) 325 MG tablet More than a month at Unknown time  Yes No   Sig: Take 650 mg by mouth   amLODIPine (NORVASC) 10 MG tablet 7/17/2019 at 0800  Yes Yes   Sig: Take 10 mg by mouth   atorvastatin (LIPITOR) 80 MG tablet 7/17/2019 at 0800  Yes Yes   Sig: Take 80 mg by mouth   enoxaparin (LOVENOX) 80 MG/0.8ML syringe 7/17/2019 at 0830  No Yes   Sig: INJECT 0.8 MLS (80 MG) SUBCUTANEOUS 2 TIMES DAILY   fluticasone (FLONASE) 50 MCG/ACT nasal spray 7/17/2019 at 0800  Yes Yes   Sig: Spray 1 spray into both nostrils daily   levothyroxine (SYNTHROID/LEVOTHROID) 25 MCG tablet 7/17/2019 at 0800  Yes Yes   Sig: Take 25 mcg by mouth   lisinopril (PRINIVIL/ZESTRIL) 20 MG tablet 7/17/2019 at 0800  Yes Yes   Sig: Take 20 mg by mouth   metFORMIN (GLUCOPHAGE) 1000 MG tablet 7/15/2019 at Unknown time  Yes Yes   Sig: Take 1,000 mg by mouth   metoprolol tartrate (LOPRESSOR) 100 MG tablet 7/17/2019 at 0800  Yes Yes   Sig: Take 100 mg by mouth   mirtazapine (REMERON) 7.5 MG tablet 7/16/2019 at Unknown time  Yes Yes   Sig: Take 7.5 mg by mouth   order for DME Unknown at Unknown time  No No   Sig: [facility]  [place]:Phone: [phone] Fax: [phone]  [place]: Phone [phone] Fax [phone]  Please call Patient to schedule  Evaluate and Treat as indicated   Left Foot Modification to AFO or new AFO, non healing ulcer of left great toe and rubbing on left ankle   polyethylene glycol (MIRALAX/GLYCOLAX) powder Past Month at Unknown time  Yes Yes   Sig: Take 17 g by mouth   ranitidine (ZANTAC) 75 MG tablet 7/17/2019 at 0800   "Yes Yes   Sig: Take 75 mg by mouth 2 times daily   vitamin D3 (VITAMIN D3) 1000 units (25 mcg) tablet 2019 at 0800  Yes Yes   Sig: Take 5,000 Units by mouth    warfarin (COUMADIN) 7.5 MG tablet 2019  Yes No   Si.5 mg      Facility-Administered Medications: None     Allergies   Allergies   Allergen Reactions     Oxycodone      Hallucinations.     Codeine Other (See Comments) and Itching     Gave him a \"skin crawling feeling\"  Gave him a \"skin crawling feeling\"         Social History   Chance Shrestha  reports that he has never smoked. He has never used smokeless tobacco. He reports that he does not drink alcohol or use drugs.    Family History   No family history of PAD.    Review of Systems   The 10 point Review of Systems is negative other than noted in the HPI or here.     Physical Exam   Temp: 98  F (36.7  C) Temp src: Oral BP: 160/89 Pulse: 56 Heart Rate: 55 Resp: 12 SpO2: 97 % O2 Device: Nasal cannula Oxygen Delivery: 2 LPM  Vital Signs with Ranges  Temp:  [98  F (36.7  C)] 98  F (36.7  C)  Pulse:  [53-61] 56  Heart Rate:  [53-60] 55  Resp:  [10-21] 12  BP: (100-175)/(64-90) 160/89  SpO2:  [94 %-99 %] 97 %  185 lbs 0 oz    Constitutional: awake, alert, cooperative, no apparent distress, and appears stated age  Eyes: Lids and lashes normal, pupils equal, round and reactive to light, extra ocular muscles intact, sclera clear, conjunctiva normal  ENT: normocepalic, without obvious abnormality, oropharynx pink and moist  Hematologic / Lymphatic: no lymphadenopathy  Respiratory: No increased work of breathing, good air exchange, clear to auscultation bilaterally, no crackles or wheezing  Cardiovascular: regular rate and rhythm, normal S1 and S2 and no murmur noted  GI: Normal bowel sounds, soft, non-distended, non-tender  Skin: no redness, warmth, or swelling, no rashes. Multiple abrasions on legs and left foot ulcer noted. Groin site is soft.   Musculoskeletal: There is no redness, warmth, or " swelling of the joints.  Full range of motion noted.  Motor strength is 5 out of 5 all extremities bilaterally.  Tone is normal.  Neurologic: Awake, alert, oriented to name, place and time.  Cranial nerves II-XII are grossly intact.  Motor is 5 out of 5 bilaterally.    Neuropsychiatric:  Normal affect, memory, insight.      Data   Most Recent 3 CBC's:  Recent Labs   Lab Test 07/17/19  1050 07/13/19  0917 10/26/18  1259   WBC 6.8 7.5 8.0   HGB 11.5* 12.3* 13.4   MCV 92 93 92    264 266     Most Recent 3 BMP's:  Recent Labs   Lab Test 07/17/19  1050 07/13/19  0917 10/26/18  1259   NA  --  137 139   POTASSIUM  --  4.2 4.3   CHLORIDE  --  103 105   CO2  --  27 25   BUN  --  21 26   CR 1.07 1.03 1.10   ANIONGAP  --  7 9   NITISH  --  9.1 9.5   GLC  --  208* 128*     Most Recent 3 INR's:  Recent Labs   Lab Test 07/17/19  1050 07/13/19  0917 10/26/18  1259   INR 1.33* 3.59* 3.38*     Most Recent Cholesterol Panel:  Recent Labs   Lab Test 07/17/19  1050   CHOL 167   LDL 88   HDL 33*   TRIG 228*     Most Recent Hemoglobin A1c:  Recent Labs   Lab Test 07/17/19  1050   A1C 8.2*

## 2019-07-17 NOTE — IR NOTE
Interventional Radiology Intra-procedural Nursing Note    Patient Name: Chance Shrestha  Medical Record Number: [id number]  Today's Date: July 17, 2019    Start Time: 1232  End of procedure time: 1400  Procedure: bilateral lower extremity angiogram  Report given to: care suites  Time pt departs: 1415  : n/a    Other Notes:      Patient arrives to Ir suite 2. Identification verified and consent was obtained by Dr. Boogie. Patient was then assisted onto procedure table, positioned safely and connected to monitoring equipment. Bilateral groin sites were prepped and patient was draped under sterile technique.     Versed 1.5mg IV  Fentanyl 75mcg IV  Heparin 5000 units IV    Patient tolerated procedure well. VSS. Patient alert, respirations regular and unlabored, no c/o pain at this time.   6f RFA sheath was used and removed at 1400, 6f angioseal deployed at that time.  Procedure access site (right groin) is c/d/i, covered with sterile dressing. Pulses present with doppler, same as pre-procedure.  Patient transferred back to care suites in stable condition accompanied by myself.    Vanesa Georges RN on 7/17/2019 at 2:12 PM

## 2019-07-17 NOTE — PROGRESS NOTES
Patient staying overnight tonight due to no one able to stay with him at assisted living.  Given all discharge instructions and discharge prescriptions.  -160's and will take antihypertensives as ordered from hospitalist.  Denies pain, and right groin site is CDI- no hematoma or swelling.  Pulses dopplerble, tolerates PO and voiding. Transferred to observation rm 19.

## 2019-07-17 NOTE — PROCEDURES
RADIOLOGY POST PROCEDURE NOTE w/ SEDATION  Patient name: Chance Shrestha  MRN: [id number]  : 1940    Pre-procedure diagnosis: non healing left wound  Post-procedure diagnosis: Same    Procedure Date/Time: 2019  2:11 PM  Procedure: bilateral lower extremity angiogram with angioplasty of the left peroneal artery.   Estimated blood loss: None  Specimen(s) collected with description: none    I determined this patient to be an appropriate candidate for the planned sedation and procedure and reassessed the patient IMMEDIATELY PRIOR to sedation and procedure.     The patient tolerated the procedure well with no immediate complications.  Significant findings:none    See imaging dictation for procedural details.    Provider name: Ana Boogie  Assistant(s):None

## 2019-07-17 NOTE — CONSULTS
VASCULAR SURGERY    Chance Shrestha presents today for angiography.  This 78-year-old patient with a AVR on chronic Coumadin has been seen by myself at the wound clinic.  He has a nonhealing left medial great toe and dorsal right fifth toe ulcer.  He has a history of prior angioplasty of the right leg arteries.  Also history of diabetes mellitus.     Ankle-brachial index revealed calcified noncompressible arteries bilaterally.  Duplex revealed monophasic right anterior tibial with reversal flow in the posterior tibial artery.  A weak monophasic left anterior tibial and posterior tibial signal was noted.  Left calf greater saphenous vein has been previously harvested.  With his ongoing ulcers particularly in the left great toe we felt angiography was indicated.    Hospital course: Dr. Ana Boogie perform the aortogram with bilateral runoffs via right femoral percutaneous approach.  No significant stenosis was appreciated within the aorta or iliac arteries.  Patient had only mild disease within both common femoral superficial femoral that popliteal arteries with slow flow.  On the left leg he has severe trifurcation disease.  Anterior tibial and posterior tibial is occluded throughout their entire length.  Peroneal artery had multiple high-grade stenosis in its first one half a better vessel distally giving collaterals to the dorsalis pedis artery at the ankle level.  This artery was angioplastied by Dr. Boogie at the good results and markedly improved in-line flow to the foot collaterals.    On the right side again there is trifurcation disease.  On the runoff of the peroneal artery has only mild disease.  This collateralizes to the ankle posterior tibial artery which is a good artery.    EXAM: Patient examined both pre-and post angiogram.  Following the angiogram he had an Angio-Seal in the right groin with no hematoma.  Normal CMS to both of his legs.  Ulcerations were clean on the dorsum of the right  fifth toe and to the medial aspect of the left great toe with no cellulitis and no need for debridement today.  Pedal pulses were not palpable with known trifurcation disease.  Both feet were warm and pink following the procedure.    Laboratory: Serum creatinine= 1.07   A1c= 8.2   LDL= 88   hemoglobin= 11.5                     INR=1.33 (Coumadin held with bridging Lovenox for AVR)        Impression: Severe left trifurcation disease.  Successful angioplasty of proximal one half of peroneal artery to improve the blood supply to left foot.  This artery reconstituted distal anterior tibial and dorsalis pedis artery.  Very unclear how long this will remain patent but should allow much better chance of healing of the ulceration.  Trifurcation disease also on the right side of the peroneal artery has fairly good flow down the ankle reconstituting the posterior tibial artery.  No intervention was recommended on the right side.      Patient was informed of the procedure with results.  He will start his Coumadin tonight and resume his bridging Lovenox for which he has approximately 4 doses left.  He will follow-up in the wound clinic to see if the blood supply aids in healing.  If he does not see any improvement we could consider a left below-knee popliteal to dorsalis pedis bypass graft.  The left calf greater saphenous vein has been harvested and we did evaluate the left thigh greater saphenous vein or the right leg greater saphenous vein from the conduit.    Patient is aware of good risk factor control.  LDL is close to goal on his statin.  He will and A1c is elevated implying need for more aggressive diabetic control.    He has a follow-up appointment with me at the wound clinic on 7/22/2019.  Hopefully with the improved blood supply the ulcerations will be able to heal without the bypass procedure.      Alban Jones MD

## 2019-07-17 NOTE — PROGRESS NOTES
Care Suites Admission Nursing Note    Reason for admission:Lower extremity angiogram  CS arrival time:0930   Accompanied by: Maximus  Name/phone of DC : Maximus [phone]  Medications held: Coumadin, Metformin  Consent signed: Yes  Abnormal assessment/labs: Yes  If abnormal, provider notified: MD holloway  Education/questions answered: Yes  Plan: To return to assisted living home after recovery and bedrest completed.

## 2019-07-17 NOTE — DISCHARGE INSTRUCTIONS
Peripheral Angiogram Discharge Instructions - Femoral     After you go home:      Have an adult stay with you until tomorrow.    Drink extra fluids for 2 days.    You may resume your normal diet.    No smoking       For 24 hours - due to the sedation you received:    Relax and take it easy.    Do NOT make any important or legal decisions.    Do NOT drive or operate machines at home or at work.    Do NOT drink alcohol.    Care of Groin Puncture Site:      For the first 24 hrs - check the puncture site every 1-2 hours while awake.    For 2 days, when you cough, sneeze, laugh or move your bowels, hold your hand over the puncture site and press firmly.    Remove the bandaid after 24 hours. If there is minor oozing, apply another bandaid and remove it after 12 hours.    It is normal to have a small bruise or pea size lump at the site.    You may shower tomorrow.  Do NOT take a bath, or use a hot tub or pool for at least 3 days. Do NOT scrub the site. Do not use lotion or powder near the puncture site.     Activity:            For 2 days:    No stooping or squatting    Do NOT do any heavy activity such as exercise, lifting, or straining.     No housework, yard work or any activity that make you sweat    Do NOT lift more than 10 pounds    Bleeding:      If you start bleeding from the site in your groin, lie down flat and press firmly on/above the site for 10 minutes.     Once bleeding stops, lay flat for 2 hours.     Call the [facility] as soon as you can.       Call 911 right away if you have heavy bleeding or bleeding that does not stop.      Medicines:      If you are on Metformin (Glucophage) and your GFR (kidney function level) is <30, do not restart the Metformin for 48 hours after your procedure. Check with your primary care giver before restarting the Metformin to see if you need to have blood drawn to recheck your kidney function (GFR).      Take your medications, including blood thinners, unless your  provider tells you not to.  If you take Coumadin (Warfarin), have your INR checked by your provider in  3-5 days. Call your clinic to schedule this.    If you have stopped any medicines, check with your provider about when to restart them.        Follow Up Appointments:      Follow up with [facility] as directed.    Call the clinic if:      You have increased pain or a large or growing hard lump around the site.    The site is red, swollen, hot or tender.    Blood or fluid is draining from the site.    You have chills or a fever greater than 101 F (38 C).    Your leg feels numb, cool or changes color.    You have hives, a rash or unusual itching.    New pain in the back or belly that you cannot control with Tylenol.    Any questions or concerns.    Other Instructions:      If you received a stent - carry your stent card with you at all times.      If you have questions or your original symptoms do not improve, call:         [facility] @ [phone]

## 2019-07-18 VITALS
DIASTOLIC BLOOD PRESSURE: 69 MMHG | HEART RATE: 61 BPM | TEMPERATURE: 97.3 F | BODY MASS INDEX: 24.52 KG/M2 | RESPIRATION RATE: 16 BRPM | HEIGHT: 73 IN | WEIGHT: 185 LBS | SYSTOLIC BLOOD PRESSURE: 159 MMHG | OXYGEN SATURATION: 96 %

## 2019-07-18 LAB
ANION GAP SERPL CALCULATED.3IONS-SCNC: 6 MMOL/L (ref 3–14)
BACTERIA SPEC CULT: NORMAL
BUN SERPL-MCNC: 21 MG/DL (ref 7–30)
CALCIUM SERPL-MCNC: 9 MG/DL (ref 8.5–10.1)
CHLORIDE SERPL-SCNC: 108 MMOL/L (ref 94–109)
CO2 SERPL-SCNC: 24 MMOL/L (ref 20–32)
CREAT SERPL-MCNC: 0.92 MG/DL (ref 0.66–1.25)
GFR SERPL CREATININE-BSD FRML MDRD: 78 ML/MIN/{1.73_M2}
GLUCOSE BLDC GLUCOMTR-MCNC: 217 MG/DL (ref 70–99)
GLUCOSE SERPL-MCNC: 211 MG/DL (ref 70–99)
INR PPP: 1.04 (ref 0.86–1.14)
PLATELET # BLD AUTO: 299 10E9/L (ref 150–450)
POTASSIUM SERPL-SCNC: 4.5 MMOL/L (ref 3.4–5.3)
SODIUM SERPL-SCNC: 138 MMOL/L (ref 133–144)

## 2019-07-18 PROCEDURE — 25000132 ZZH RX MED GY IP 250 OP 250 PS 637: Mod: GY | Performed by: INTERNAL MEDICINE

## 2019-07-18 PROCEDURE — 99232 SBSQ HOSP IP/OBS MODERATE 35: CPT | Performed by: INTERNAL MEDICINE

## 2019-07-18 PROCEDURE — 25000128 H RX IP 250 OP 636: Performed by: INTERNAL MEDICINE

## 2019-07-18 PROCEDURE — 85049 AUTOMATED PLATELET COUNT: CPT | Performed by: INTERNAL MEDICINE

## 2019-07-18 PROCEDURE — 80048 BASIC METABOLIC PNL TOTAL CA: CPT | Performed by: INTERNAL MEDICINE

## 2019-07-18 PROCEDURE — 40000934 ZZH STATISTIC OUTPATIENT (NON-OBS) DAY

## 2019-07-18 PROCEDURE — 85610 PROTHROMBIN TIME: CPT | Performed by: INTERNAL MEDICINE

## 2019-07-18 PROCEDURE — 36415 COLL VENOUS BLD VENIPUNCTURE: CPT | Performed by: INTERNAL MEDICINE

## 2019-07-18 PROCEDURE — 82962 GLUCOSE BLOOD TEST: CPT

## 2019-07-18 RX ORDER — WARFARIN SODIUM 5 MG/1
10 TABLET ORAL
Status: DISCONTINUED | OUTPATIENT
Start: 2019-07-18 | End: 2019-07-18 | Stop reason: HOSPADM

## 2019-07-18 RX ADMIN — METOPROLOL TARTRATE 100 MG: 100 TABLET, FILM COATED ORAL at 07:49

## 2019-07-18 RX ADMIN — ENOXAPARIN SODIUM 80 MG: 80 INJECTION SUBCUTANEOUS at 07:50

## 2019-07-18 RX ADMIN — GLIMEPIRIDE 1 MG: 1 TABLET ORAL at 07:50

## 2019-07-18 RX ADMIN — LISINOPRIL 20 MG: 20 TABLET ORAL at 07:49

## 2019-07-18 RX ADMIN — RANITIDINE 75 MG: 75 TABLET, COATED ORAL at 07:49

## 2019-07-18 RX ADMIN — LEVOTHYROXINE SODIUM 25 MCG: 25 TABLET ORAL at 07:50

## 2019-07-18 RX ADMIN — INSULIN ASPART 2 UNITS: 100 INJECTION, SOLUTION INTRAVENOUS; SUBCUTANEOUS at 07:55

## 2019-07-18 RX ADMIN — AMLODIPINE BESYLATE 10 MG: 10 TABLET ORAL at 07:49

## 2019-07-18 NOTE — PLAN OF CARE
A&Ox3, disoriented to place, VSS on RA with ex of htn. Forgetful. Denies pain. R angioplasty groin site CDI with marked dried drainage, unchanged.  Mod carb diet, voiding adequately. Ax1 pivot to wheelchair; wheelchair bound. IV NS was infusing 50/hr, infiltrated. Ice applied. Currently SL. Plan to DC in the AM with friend Maximus as his ride - number [phone].

## 2019-07-18 NOTE — PHARMACY-ANTICOAGULATION SERVICE
Clinical Pharmacy - Warfarin Dosing Consult     Pharmacy has been consulted to manage this patient s warfarin therapy.  Indication: Mechanical Aortic Valve Replacement  Therapy Goal: INR 2.5-3.5  Warfarin Prior to Admission: Yes  Warfarin PTA Regimen: 7.5 mg daily  Significant drug interactions: none significant. but pt on Lovenox.     INR   Date Value Ref Range Status   07/17/2019 1.33 (H) 0.86 - 1.14 Final   07/13/2019 3.59 (H) 0.86 - 1.14 Final       Recommend warfarin 7.5 mg today.  Pharmacy will monitor Chance Shrestha daily and order warfarin doses to achieve specified goal.      Please contact pharmacy as soon as possible if the warfarin needs to be held for a procedure or if the warfarin goals change.

## 2019-07-18 NOTE — PHARMACY-ADMISSION MEDICATION HISTORY
Admission medication history interview status for the 7/17/2019  admission is complete. See EPIC admission navigator for prior to admission medications     Medication history source reliability:Good    Actions taken by pharmacist (provider contacted, etc): called assisted living facility [facility]    Additional medication history information not noted on PTA med list :  - Med hx completed by RN, however, had to call the facility on warfarin and went over the meds with the staff.   -Last doses per RN who did the meds hx either in care suites or preop.   - Added venlafaxine ER, and changed mirtazapine from 7.5 mg 15 mg    Medication reconciliation/reorder completed by provider prior to medication history? Yes    Time spent in this activity: 10 min    Prior to Admission medications    Medication Sig Last Dose Taking? Auth Provider   amLODIPine (NORVASC) 10 MG tablet Take 10 mg by mouth daily  7/17/2019 at 0800 Yes Reported, Patient   atorvastatin (LIPITOR) 80 MG tablet Take 80 mg by mouth daily  7/17/2019 at 0800 Yes Reported, Patient   enoxaparin (LOVENOX) 80 MG/0.8ML syringe INJECT 0.8 MLS (80 MG) SUBCUTANEOUS 2 TIMES DAILY 7/17/2019 at 0830 Yes Alban Jones MD   ezetimibe (ZETIA) 10 MG tablet Take 1 tablet (10 mg) by mouth daily  Yes Adrienne Willis PA-C   fluticasone (FLONASE) 50 MCG/ACT nasal spray Spray 1 spray into both nostrils daily 7/17/2019 at 0800 Yes Reported, Patient   glimepiride (AMARYL) 1 MG tablet Take 1 tablet (1 mg) by mouth every morning (before breakfast)  Yes Adrienne Willis PA-C   levothyroxine (SYNTHROID/LEVOTHROID) 25 MCG tablet Take 25 mcg by mouth daily  7/17/2019 at 0800 Yes Reported, Patient   lisinopril (PRINIVIL/ZESTRIL) 20 MG tablet Take 20 mg by mouth daily  7/17/2019 at 0800 Yes Reported, Patient   metFORMIN (GLUCOPHAGE) 1000 MG tablet Take 1,000 mg by mouth 2 times daily (with meals)  7/15/2019 at Unknown time Yes Reported, Patient   metoprolol tartrate  (LOPRESSOR) 100 MG tablet Take 100 mg by mouth 2 times daily  7/17/2019 at 0800 Yes Reported, Patient   mirtazapine (REMERON) 15 MG tablet Take 15 mg by mouth At Bedtime  Yes Unknown, Entered By History   Multiple Vitamin (DAILY VITES) TABS Take 1 tablet by mouth daily  7/17/2019 at 0800 Yes Reported, Patient   polyethylene glycol (MIRALAX/GLYCOLAX) powder Take 17 g by mouth daily as needed  Past Month at Unknown time Yes Reported, Patient   ranitidine (ZANTAC) 75 MG tablet Take 75 mg by mouth 2 times daily 7/17/2019 at 0800 Yes Reported, Patient   venlafaxine (EFFEXOR-XR) 37.5 MG 24 hr capsule Take 37.5 mg by mouth daily With venlafaxine ER 75 mg 7/16/2019 at Unknown time Yes Unknown, Entered By History   venlafaxine (EFFEXOR-XR) 75 MG 24 hr capsule Take 75 mg by mouth daily With venlafaxine er 37.5 mg 7/16/2019 at Unknown time Yes Unknown, Entered By History   vitamin D3 (VITAMIN D3) 1000 units (25 mcg) tablet Take 5,000 Units by mouth  7/17/2019 at 0800 Yes Reported, Patient   acetaminophen (PHARBETOL) 325 MG tablet Take 650 mg by mouth More than a month at Unknown time  Reported, Patient   order for [facility]  [place]:Phone: [phone] Fax: [phone]  [place]: Phone [phone] Fax [phone]  Please call Patient to schedule  Evaluate and Treat as indicated   Left Foot Modification to AFO or new AFO, non healing ulcer of left great toe and rubbing on left ankle Unknown at Unknown time  Anali Parr PA-C   warfarin (COUMADIN) 7.5 MG tablet 7.5 mg daily  7/13/2019  Reported, Patient

## 2019-07-18 NOTE — PLAN OF CARE
Pt settled around 1840. Pt VSS. A&O with eps of forgetfulness. Pt CMS intact. Pulses weak to palpation on BLE. Pt groin site soft CDI. Should discharge tomorrow with friend Maximus.

## 2019-07-18 NOTE — PROGRESS NOTES
Discharge criteria met. Discussed discharge instructions with pt. Pt verbalized understanding. All questions answered. Rx filled and given to pt. Pt left the unit in stable condition accompanied by staff and friend (Maximus).

## 2019-07-18 NOTE — PLAN OF CARE
Patient alert and oriented. Post bilateral lower extremity angio with angioplasty to left peroneal artery. Right groin site has angioseal and dressing has scant amount of drainage. No bruit. CMS intact. Pulses weak. Patient has multiple scabs on face and legs due to fall that occurred 5 days ago. Tele: Sinus rhythm. Room air. Assist of 1 pivot to wheelchair. Discharge is complete and patients ride will be here in the morning.

## 2019-07-18 NOTE — DISCHARGE SUMMARY
[facility]    Discharge Summary  Vascular Medicine    Date of Admission:  7/17/2019  Date of Discharge:  7/18/2019  Discharging Provider: Joseph Alicia MD  Date of Service (when I saw the patient): 07/18/19         Physician Supervisory Attestation:   I have reviewed and discussed with the physician assistant their history, physical and plan and independently interviewed and examined Chance Shrestha and agree with the plan as stated in the physician assistant note.    Doing well access site looks good  Discussed med changes  Follow up with primary and get Lipids and A1c in 3 months    Patient care time spent 35 minutes today.    Joseph Alicia MD 7/18/2019    Vascular Medicine      Discharge Diagnoses   1. Critical limb ischemia of a non-healing left toe ulcer s/p angiogram with angioplasty of the left peroneal artery 7/17/19    2. Hyperlipidemia     3. Type 2 Diabetes Mellitus    Hospital Course   Chance Shrestha was admitted on 7/17/2019.  The following problems were addressed during his hospitalization:    1. Critical limb ischemia of a non-healing left toe ulcer s/p angiogram with angioplasty of the left peroneal artery 7/17/19     Post-procedure cares and follow-up per Dr. Jones. He can resume his warfarin with Lovenox bridging and should follow up closely with his anticoagulation clinic. He did have to spend one night in observation post-procedure as he had no one to stay the night with him at his assisted living facility.      2. Hyperlipidemia     His lipid panel this admission is significant for an LDL of 88, HDL 33, triglycerides 228, and total cholesterol 167 while on Atorvastatin 80 mg daily. Given his history of diabetes, CVA, CAD, and PAD it is highly recommended that he be treated slightly more aggressively to an LDL of less than 70. Therefore, would recommend adding Zetia 10 mg daily to his maximum dose Atorvastatin. He should then have a lipid panel repeated in  3 months to ascertain whether or not his lipids are at goal on this regimen. If his LDL is still above 70, he should follow up with Vascular Medicine.      3. Type 2 Diabetes Mellitus     His A1C this admission is noted to be elevated at 8.2 %. Again, given his pan vascular disease and for optimal wound healing, he should have tighter control of his sugars to a goal A1C of less than 7.0%. He is presently on Metformin only as an outpatient. It is recommended that he also initiate Amaryl 1 mg daily. He should then have an A1C repeated in 3 months through his primary care provider.     Code Status   Full Code    Primary Care Physician   Ernie Shrestha      Time Spent on this Encounter   Spent greater than 30 minutes discharging this patient.    Discharge Disposition   Discharged to assisted living  Condition at discharge: Stable    Consultations This Hospital Stay   HOSPITALIST IP CONSULT  PHARMACY TO DOSE WARFARIN    Discharge Orders      Discharge Instructions    1. Hold your metformin for the next 48 hours due to the contrast you received from your procedure.     2. Start taking Amaryl 1 mg daily for your diabetes in addition to your metformin. Have your A1C repeated through your primary care provider in 3 months.     3. Start taking Zetia in addition to your to your Atorvastatin for your cholesterol. Have your cholesterol rechecked in 3 months through your primary care provider.     4. Continue Lovenox injections in addition to your warfarin until your INR is above 2 and have your INR checked in the next few days.     Reason for your hospital stay    Critical limb ischemia s/p angiogram     Activity    Your activity upon discharge: activity as tolerated     Follow-up and recommended labs and tests     Follow up with Dr. Jones in wound clinic.     Full Code     Diet    Follow this diet upon discharge:       Moderate Consistent CHO Diet     Discharge Medications   Current Discharge Medication List      START taking  these medications    Details   ezetimibe (ZETIA) 10 MG tablet Take 1 tablet (10 mg) by mouth daily  Qty: 90 tablet, Refills: 3    Associated Diagnoses: Hyperlipidemia LDL goal <70      glimepiride (AMARYL) 1 MG tablet Take 1 tablet (1 mg) by mouth every morning (before breakfast)  Qty: 90 tablet, Refills: 1    Associated Diagnoses: Type 2 diabetes mellitus with foot ulcer, without long-term current use of insulin (H)         CONTINUE these medications which have NOT CHANGED    Details   amLODIPine (NORVASC) 10 MG tablet Take 10 mg by mouth daily       atorvastatin (LIPITOR) 80 MG tablet Take 80 mg by mouth daily       enoxaparin (LOVENOX) 80 MG/0.8ML syringe INJECT 0.8 MLS (80 MG) SUBCUTANEOUS 2 TIMES DAILY  Qty: 12 Syringe, Refills: 0    Associated Diagnoses: PAD (peripheral artery disease) (H); Warfarin anticoagulation      fluticasone (FLONASE) 50 MCG/ACT nasal spray Spray 1 spray into both nostrils daily      levothyroxine (SYNTHROID/LEVOTHROID) 25 MCG tablet Take 25 mcg by mouth daily       lisinopril (PRINIVIL/ZESTRIL) 20 MG tablet Take 20 mg by mouth daily       metFORMIN (GLUCOPHAGE) 1000 MG tablet Take 1,000 mg by mouth 2 times daily (with meals)       metoprolol tartrate (LOPRESSOR) 100 MG tablet Take 100 mg by mouth 2 times daily       mirtazapine (REMERON) 15 MG tablet Take 15 mg by mouth At Bedtime      Multiple Vitamin (DAILY VITES) TABS Take 1 tablet by mouth daily       polyethylene glycol (MIRALAX/GLYCOLAX) powder Take 17 g by mouth daily as needed       ranitidine (ZANTAC) 75 MG tablet Take 75 mg by mouth 2 times daily      !! venlafaxine (EFFEXOR-XR) 37.5 MG 24 hr capsule Take 37.5 mg by mouth daily With venlafaxine ER 75 mg      !! venlafaxine (EFFEXOR-XR) 75 MG 24 hr capsule Take 75 mg by mouth daily With venlafaxine er 37.5 mg      vitamin D3 (VITAMIN D3) 1000 units (25 mcg) tablet Take 5,000 Units by mouth       acetaminophen (PHARBETOL) 325 MG tablet Take 650 mg by mouth      order for DME  "[facility]  [place]:Phone: [phone] Fax: [phone]  [place]: Phone [phone] Fax [phone]  Please call Patient to schedule  Evaluate and Treat as indicated   Left Foot Modification to AFO or new AFO, non healing ulcer of left great toe and rubbing on left ankle  Qty: 1 Device, Refills: 0    Associated Diagnoses: Skin ulcer of toe of left foot with fat layer exposed (H); Type 2 diabetes mellitus with foot ulcer, unspecified whether long term insulin use (H)      warfarin (COUMADIN) 7.5 MG tablet 7.5 mg daily        !! - Potential duplicate medications found. Please discuss with provider.        Allergies   Allergies   Allergen Reactions     Oxycodone      Hallucinations.     Codeine Other (See Comments) and Itching     Gave him a \"skin crawling feeling\"  Gave him a \"skin crawling feeling\"       Data   Most Recent 3 CBC's:  Recent Labs   Lab Test 07/18/19  0623 07/17/19  1050 07/13/19  0917 10/26/18  1259   WBC  --  6.8 7.5 8.0   HGB  --  11.5* 12.3* 13.4   MCV  --  92 93 92    277 264 266      Most Recent 3 BMP's:  Recent Labs   Lab Test 07/18/19  0623 07/17/19  1050 07/13/19  0917 10/26/18  1259     --  137 139   POTASSIUM 4.5  --  4.2 4.3   CHLORIDE 108  --  103 105   CO2 24  --  27 25   BUN 21  --  21 26   CR 0.92 1.07 1.03 1.10   ANIONGAP 6  --  7 9   NITISH 9.0  --  9.1 9.5   *  --  208* 128*     Most Recent INR's and Anticoagulation Dosing History:  Anticoagulation Dose History     Recent Dosing and Labs Latest Ref Rng & Units 8/12/2013 4/17/2018 10/26/2018 7/13/2019 7/17/2019 7/18/2019    ZZ IMS TEMPLATE - - - - - 7.5 mg -    INR 0.86 - 1.14 2.31(H) 2.48(H) 3.38(H) 3.59(H) 1.33(H) 1.04        Most Recent Cholesterol Panel:  Recent Labs   Lab Test 07/17/19  1050   CHOL 167   LDL 88   HDL 33*   TRIG 228*     Most Recent TSH, T4 and A1c Labs:  Recent Labs   Lab Test 07/17/19  1050   A1C 8.2*     Results for orders placed or performed during the hospital " encounter of 07/17/19   IR Lower Extremity Angiogram Bilateral    Narrative    PROCEDURE:   1. Ultrasound guided right femoral artery access.   2. Pelvic arteriography  3. Bilateral lower extremity arteriography   4. Superselective catheterization and angiography of the left peroneal  artery.  5. Angioplasty of the left peroneal artery with post angioplasty  angiography.    DATE OF PROCEDURE: 7/17/2019    MEDICATIONS:  9 mL 1% Lidocaine SQ, Versed 1.5 mg IV, Fentanyl 75 mcg  IV and 5000 units heparin IV.    CONTRAST: 70 mL Visipaque IA    FLUOROSCOPY TIME: 20.8 minutes    AIR KERMA: 102.3 mGy.    COMPLICATIONS: None    CLINICAL HISTORY/INDICATION: 78-year-old male with nonhealing left toe  wound. Severe atherosclerosis.    PROCEDURE AND FINDINGS:  Following a discussion of the risks, benefits, indications, and  alternatives to treatment, appropriate informed consent was obtained  from the patient. The patient was brought to the interventional  radiology suite and placed supine on the table. Bilateral groins were  prepped and draped in a sterile fashion.  A timeout was performed per  universal protocol policy to confirm the correct patient, site and  procedure to be performed.    A preliminary ultrasound of the right  groin was performed and  demonstrates a patent right common femoral artery.  A permanent  ultrasound image was recorded.  Using a combination of fluoroscopy and  ultrasound, an access site was determined.  The overlying skin was  anesthetized with 1% Lidocaine.  Using ultrasound guidance,  access  into the right common femoral artery was obtained with visualization  of needle entry into the vessel. A 0.018 wire was advanced through the  needle and exchange was made for a 5 [place] micropuncture sheath. A  0.035 wire was advanced through the micropuncture sheath and exchange  was made for a 5 [place] vascular sheath.    A Bentson and 5 [place] Omni Flush catheter were inserted to just above  the level of  the aortic bifurcation. Pelvic angiography was then  performed and demonstrates the distal abdominal aorta, common iliac,  external iliac and internal iliac arteries are patent. Bilateral  common femoral arteries are patent.    The left common iliac artery was then selected using a combination of  the flush catheter and J-wire.  This system was then advanced into the  left common femoral artery at the level of the femoral head. Serial  digital subtraction angiography was then performed proximal to distal  to include the SFA, popliteal artery, trifurcation, and vasculature of  the foot. These angiographic images demonstrate left common femoral  and profunda femoral arteries are patent. Mild stenosis of the distal  superficial femoral artery at the level of the abductor canal.  Popliteal artery is patent. There is single vessel runoff via the  peroneal artery which has multifocal areas of severe stenosis.    The catheter was removed over a wire. The 5 British vascular sheath was  removed over a wire and exchanged for a 6 British vascular sheath which  was advanced over the aortic arch into the left common femoral artery.  Using a combination of catheter and wire, access was gained into the  distal left peroneal artery. The wire was removed. An image was  obtained which shows access into the distal peroneal artery.  Angioplasty was performed throughout the multi areas of severe  stenosis throughout the peroneal artery with a 2 x 40 mm balloon. Post  images show significant improvement of the appearance of the peroneal  artery. Catheter and wire were removed.    The 6 British vascular sheath was pulled back into the right external  iliac artery. Right lower extremity angiography was performed via the  right vascular sheath. Right lower extremity angiogram shows the right  common femoral, profunda femoral and superficial femoral arteries are  patent. Mild multifocal stenosis of the popliteal artery. Anterior  tibial artery  occludes in the proximal calf. Posterior tibial artery  occludes in the proximal calf. Single vessel runoff via the peroneal  artery.    At this point, the catheter was removed over the wire. A preclosure  digital subtraction angiographic run was performed to evaluate the  access site and adequate hemostasis was then obtained using  a 6  [place] Angio-Seal closure device.     Throughout the procedure, the patient was monitored by a radiology  nurse for cardiac rhythm and oxygen saturation which remained stable.  Total moderate sedation time was 20.8 min. The patient tolerated the  procedure well and left interventional radiology in stable condition.      Impression    IMPRESSION:   1. Left lower extremity: Single vessel runoff via the peroneal artery.  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  2. Right lower extremity: Single vessel runoff via the peroneal  artery.

## 2019-07-18 NOTE — PROGRESS NOTES
Discussed with vascular medicine. They will follow as primary medicine consult. Hospitalist will sign off. Please call if galileo York PA-C  Hospitalist Service  [phone]

## 2019-07-22 ENCOUNTER — HOSPITAL ENCOUNTER (OUTPATIENT)
Dept: WOUND CARE | Facility: CLINIC | Age: 79
Discharge: HOME | End: 2019-07-22
Attending: SURGERY | Admitting: SURGERY
Payer: MEDICARE

## 2019-07-22 VITALS — SYSTOLIC BLOOD PRESSURE: 137 MMHG | DIASTOLIC BLOOD PRESSURE: 78 MMHG | TEMPERATURE: 96.5 F | HEART RATE: 65 BPM

## 2019-07-22 DIAGNOSIS — I73.9 PAD (PERIPHERAL ARTERY DISEASE) (H): ICD-10-CM

## 2019-07-22 DIAGNOSIS — L97.522 SKIN ULCER OF TOE OF LEFT FOOT WITH FAT LAYER EXPOSED (H): ICD-10-CM

## 2019-07-22 PROCEDURE — 93923 UPR/LXTR ART STDY 3+ LVLS: CPT

## 2019-07-22 PROCEDURE — 11042 DBRDMT SUBQ TIS 1ST 20SQCM/<: CPT | Performed by: SURGERY

## 2019-07-22 PROCEDURE — A6021 COLLAGEN DRESSING <=16 SQ IN: HCPCS

## 2019-07-22 PROCEDURE — 11042 DBRDMT SUBQ TIS 1ST 20SQCM/<: CPT

## 2019-07-22 NOTE — PROGRESS NOTES
[facility] Progress Note    Subject: Chance Shrestha returns for follow-up of his left and right fifth toe ulcers.  He underwent angiography on 7/17/2019.  No significant aortoiliac occlusive disease.  Severe trifurcation disease was noted bilaterally.  There was a single vessel though patent runoff on the right peroneal artery collaterals into the ankle posterior tibial artery.  On the left runoff was again via the peroneal artery that had several stenoses which were angioplastied.  Good technical result was noted.  This collateralizes to the distal anterior tibial and dorsalis pedis artery.    He was treated with bridging Lovenox and restarted on his Coumadin due to his aortic valve replacement surgery.  He took Lovenox yesterday.  He can get his INR checked today which was still subtherapeutic 3 days ago following the angiogram.    He had no problems with the right groin device was used with no hematoma (did have a significant hematoma in the right leg following his angiogram several years ago).    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)     PVD (peripheral vascular disease) (H)     SAH (subarachnoid hemorrhage) (H)     Skin ulcer of toe of right foot, limited to breakdown of skin (H)     Status post amputation of right great toe (H)     Tobacco use     Type 2 diabetes  mellitus with foot ulcer (H)     Uncomplicated alcohol abuse     Vertigo as late effect of stroke     Warfarin anticoagulation     Skin ulcer of toe of left foot with fat layer exposed (H)     PAD (peripheral artery disease) (H)     Past Medical History:   Diagnosis Date     Constipation      Depression      DM type 2 (diabetes mellitus, type 2) (H)      GERD (gastroesophageal reflux disease)      HLD (hyperlipidemia)      HTN (hypertension)      Hypothyroid      Ischemic heart disease      Seizures (H)      Systolic CHF (H)      Exam:  /78   Pulse 65   Temp 96.5  F (35.8  C) (Tympanic)   Wound (used by [facility] only) 06/12/19 0935 Left plantar 1 toe ulceration, arterial;pressure injury (Active)   Length (cm) 0.9 7/22/2019 10:00 AM   Width (cm) 0.9 7/22/2019 10:00 AM   Depth (cm) 0.2 7/22/2019 10:00 AM   Wound (cm^2) 0.81 cm^2 7/22/2019 10:00 AM   Wound Volume (cm^3) 0.16 cm^3 7/22/2019 10:00 AM   Wound healing % 30.77 7/22/2019 10:00 AM   Dressing Appearance moist drainage 7/22/2019 10:00 AM   Drainage Characteristics/Odor serosanguineous 7/22/2019 10:00 AM   Drainage Amount moderate 7/22/2019 10:00 AM       [facility] 07/08/19 1011 Right 5 toe pressure injury (Active)   Length (cm) 0.3 7/22/2019 10:00 AM   Width (cm) 0.3 7/22/2019 10:00 AM   Depth (cm) 0.1 7/22/2019 10:00 AM   Wound (cm^2) 0.09 cm^2 7/22/2019 10:00 AM   Wound Volume (cm^3) 0.01 cm^3 7/22/2019 10:00 AM   Wound healing % 78.57 7/22/2019 10:00 AM   Dressing Appearance moist drainage 7/22/2019 10:00 AM   Drainage Characteristics/Odor serosanguineous 7/22/2019 10:00 AM   Drainage Amount moderate 7/22/2019 10:00 AM     Appropriate.  Very talkative and comfortable.  Small right fifth toe ulcer measuring 0.3 x 0.3 x 0.1 cm.  No cellulitis.  No undermining.    The left medial great toe ulcer measures 0.9 x 0.9 x 0.2 cm.  Fibrin and slough are noted.  No visible granulation.  No cellulitis.  No undermining.    Bedside Doppler reveals  improved left distal anterior tibial and dorsalis pedis signal following the peroneal angioplasty.    Procedure:   Patient was determined to be capable of making their own medical decisions and informed consent was obtained. Topical anesthetic of 4% lidocaine was applied, debridement was performed using a #15 blade down to and including subcutaneous tissue.  All fibrin and slough was removed from both the ulcers.  Excellent bleeding on the right.  Much improved bleeding on the left.  Prosthetic in the base and excision of the skin ulcer edges was performed on both but particular on the left.  Bleeding controlled with light pressure. Patient tolerated procedure well.    Impression: Hopefully with the improved blood supply will be able to heal the left great toe also by second intent.  We will switch to endoform on both of the sites along with a protective dressings that he will change every other day.  We will get his INR checked today at his care facility where they have nurses that can do this since he has only 2 Lovenox injections left.  Hopefully this is therapeutic at 2.5.  And if so he may stop the Lovenox injections.    Plan: We will dress the wounds with Fibracol to the sites every other day..  Patient will return to the clinic in 3 weeks time    Alban Jones MD on 7/22/2019 at 11:28 AM

## 2019-07-22 NOTE — DISCHARGE INSTRUCTIONS
[facility]  [address] [facility] [address], [place] [address]  Appointment Phone [phone] Nurse Advisors [phone]    Chance Shrestha 1940    [facility] Phone [phone] Fax [phone]    Wound Dressing Change:Left Great Toe and Right 5th toe  Cleanse wounds with soap and water  Cover wounds with Endoform  Cover wounds with bandaid  Change dressing every other day    Dr. Alban Jones, July 22, 2019    Call us at [phone] if you have any questions about your wounds, have redness or  swelling around your wound, have a fever of 101 or greater or if you have any other  problems or concerns. We answer the phone Monday through Friday 8 am to 4 pm,  please leave a message as we check the voicemail frequently throughout the day.    Follow up with Dr. Jones in 3 weeks

## 2019-07-23 ENCOUNTER — RECORDS - HEALTHEAST (OUTPATIENT)
Dept: LAB | Facility: CLINIC | Age: 79
End: 2019-07-23

## 2019-07-23 LAB — INR PPP: 2.22 (ref 0.9–1.1)

## 2019-07-25 ENCOUNTER — RECORDS - HEALTHEAST (OUTPATIENT)
Dept: LAB | Facility: CLINIC | Age: 79
End: 2019-07-25

## 2019-07-25 LAB — INR PPP: 2.13 (ref 0.9–1.1)

## 2019-07-26 ENCOUNTER — TELEPHONE (OUTPATIENT)
Dept: SURGERY | Facility: CLINIC | Age: 79
End: 2019-07-26

## 2019-07-26 NOTE — TELEPHONE ENCOUNTER
Reason for Call:  Cassie from [facility] called regarding right 5th toe is healed.Big Left Toe macerated - TX endoform and bandaid. Okay to stop treating. Continue to treat great toe wound and call us with concerns on Monday if you have any.    Pt Provider: Alban Jones M.D.     Phone Number can be reached at: [phone]     Can we leave a detailed message on this number? YES      Silvia Robbins RN on 7/26/2019 at 1:52 PM      Routed to  Wound Healing Nurse Pool

## 2019-07-27 ENCOUNTER — RECORDS - HEALTHEAST (OUTPATIENT)
Dept: LAB | Facility: CLINIC | Age: 79
End: 2019-07-27

## 2019-07-29 LAB — INR PPP: 2.81 (ref 0.9–1.1)

## 2019-07-30 ENCOUNTER — HOSPITAL ENCOUNTER (EMERGENCY)
Facility: CLINIC | Age: 79
Discharge: NURSING, REHAB OR LONG-TERM CARE | End: 2019-07-30
Attending: EMERGENCY MEDICINE | Admitting: EMERGENCY MEDICINE
Payer: MEDICARE

## 2019-07-30 ENCOUNTER — APPOINTMENT (OUTPATIENT)
Dept: CT IMAGING | Facility: CLINIC | Age: 79
End: 2019-07-30
Attending: EMERGENCY MEDICINE
Payer: MEDICARE

## 2019-07-30 VITALS
DIASTOLIC BLOOD PRESSURE: 90 MMHG | WEIGHT: 195 LBS | OXYGEN SATURATION: 96 % | RESPIRATION RATE: 14 BRPM | SYSTOLIC BLOOD PRESSURE: 136 MMHG | HEIGHT: 73 IN | BODY MASS INDEX: 25.84 KG/M2 | HEART RATE: 65 BPM | TEMPERATURE: 98.5 F

## 2019-07-30 DIAGNOSIS — E86.0 DEHYDRATION: ICD-10-CM

## 2019-07-30 DIAGNOSIS — R41.0 CONFUSION: ICD-10-CM

## 2019-07-30 LAB
ALBUMIN UR-MCNC: 10 MG/DL
ANION GAP SERPL CALCULATED.3IONS-SCNC: 5 MMOL/L (ref 3–14)
APPEARANCE UR: CLEAR
BASOPHILS # BLD AUTO: 0 10E9/L (ref 0–0.2)
BASOPHILS NFR BLD AUTO: 0.3 %
BILIRUB UR QL STRIP: NEGATIVE
BUN SERPL-MCNC: 31 MG/DL (ref 7–30)
CALCIUM SERPL-MCNC: 9.2 MG/DL (ref 8.5–10.1)
CHLORIDE SERPL-SCNC: 107 MMOL/L (ref 94–109)
CO2 SERPL-SCNC: 25 MMOL/L (ref 20–32)
COLOR UR AUTO: YELLOW
CREAT SERPL-MCNC: 1.24 MG/DL (ref 0.66–1.25)
DIFFERENTIAL METHOD BLD: ABNORMAL
EOSINOPHIL # BLD AUTO: 0.1 10E9/L (ref 0–0.7)
EOSINOPHIL NFR BLD AUTO: 0.9 %
ERYTHROCYTE [DISTWIDTH] IN BLOOD BY AUTOMATED COUNT: 14 % (ref 10–15)
GFR SERPL CREATININE-BSD FRML MDRD: 54 ML/MIN/{1.73_M2}
GLUCOSE SERPL-MCNC: 143 MG/DL (ref 70–99)
GLUCOSE UR STRIP-MCNC: 30 MG/DL
HCT VFR BLD AUTO: 36.7 % (ref 40–53)
HGB BLD-MCNC: 12 G/DL (ref 13.3–17.7)
HGB UR QL STRIP: NEGATIVE
IMM GRANULOCYTES # BLD: 0 10E9/L (ref 0–0.4)
IMM GRANULOCYTES NFR BLD: 0.1 %
INR PPP: 2.82 (ref 0.86–1.14)
KETONES UR STRIP-MCNC: NEGATIVE MG/DL
LEUKOCYTE ESTERASE UR QL STRIP: NEGATIVE
LYMPHOCYTES # BLD AUTO: 1.7 10E9/L (ref 0.8–5.3)
LYMPHOCYTES NFR BLD AUTO: 25.1 %
MCH RBC QN AUTO: 30.8 PG (ref 26.5–33)
MCHC RBC AUTO-ENTMCNC: 32.7 G/DL (ref 31.5–36.5)
MCV RBC AUTO: 94 FL (ref 78–100)
MONOCYTES # BLD AUTO: 0.7 10E9/L (ref 0–1.3)
MONOCYTES NFR BLD AUTO: 9.6 %
MUCOUS THREADS #/AREA URNS LPF: PRESENT /LPF
NEUTROPHILS # BLD AUTO: 4.3 10E9/L (ref 1.6–8.3)
NEUTROPHILS NFR BLD AUTO: 64 %
NITRATE UR QL: NEGATIVE
PH UR STRIP: 5.5 PH (ref 5–7)
PLATELET # BLD AUTO: 322 10E9/L (ref 150–450)
POTASSIUM SERPL-SCNC: 4.6 MMOL/L (ref 3.4–5.3)
RBC # BLD AUTO: 3.9 10E12/L (ref 4.4–5.9)
RBC #/AREA URNS AUTO: <1 /HPF (ref 0–2)
SODIUM SERPL-SCNC: 137 MMOL/L (ref 133–144)
SOURCE: ABNORMAL
SP GR UR STRIP: 1.02 (ref 1–1.03)
SQUAMOUS #/AREA URNS AUTO: <1 /HPF (ref 0–1)
UROBILINOGEN UR STRIP-MCNC: NORMAL MG/DL (ref 0–2)
WBC # BLD AUTO: 6.8 10E9/L (ref 4–11)
WBC #/AREA URNS AUTO: 1 /HPF (ref 0–5)

## 2019-07-30 PROCEDURE — 85610 PROTHROMBIN TIME: CPT | Performed by: EMERGENCY MEDICINE

## 2019-07-30 PROCEDURE — 99284 EMERGENCY DEPT VISIT MOD MDM: CPT | Mod: 25

## 2019-07-30 PROCEDURE — 80048 BASIC METABOLIC PNL TOTAL CA: CPT | Performed by: EMERGENCY MEDICINE

## 2019-07-30 PROCEDURE — 96360 HYDRATION IV INFUSION INIT: CPT

## 2019-07-30 PROCEDURE — 70450 CT HEAD/BRAIN W/O DYE: CPT

## 2019-07-30 PROCEDURE — 25000128 H RX IP 250 OP 636: Performed by: EMERGENCY MEDICINE

## 2019-07-30 PROCEDURE — 87086 URINE CULTURE/COLONY COUNT: CPT | Performed by: EMERGENCY MEDICINE

## 2019-07-30 PROCEDURE — 85025 COMPLETE CBC W/AUTO DIFF WBC: CPT | Performed by: EMERGENCY MEDICINE

## 2019-07-30 PROCEDURE — 81001 URINALYSIS AUTO W/SCOPE: CPT | Performed by: EMERGENCY MEDICINE

## 2019-07-30 RX ADMIN — SODIUM CHLORIDE 500 ML: 9 INJECTION, SOLUTION INTRAVENOUS at 15:15

## 2019-07-30 ASSESSMENT — ENCOUNTER SYMPTOMS
DIFFICULTY URINATING: 0
FREQUENCY: 0
DYSURIA: 0
CONFUSION: 1
HEADACHES: 0

## 2019-07-30 ASSESSMENT — MIFFLIN-ST. JEOR: SCORE: 1648.39

## 2019-07-30 NOTE — ED TRIAGE NOTES
Pt's home health RN called EMS for concerns the pt has developed a UTI, stating the pt has foul smelling urine and recent confusion overt the last 2 days.  Per EMS, pt has a hisytory of CVA and has left sided deficits at baseline.  
no

## 2019-07-30 NOTE — ED AVS SNAPSHOT
Emergency Department  [address] [place] [address]  Phone:  [phone]  Fax:  [phone]                                    Chance Shrestha   MRN: [id number]    Department:   Emergency Department   Date of Visit:  7/30/2019           After Visit Summary Signature Page    I have received my discharge instructions, and my questions have been answered. I have discussed any challenges I see with this plan with the nurse or doctor.    ..........................................................................................................................................  Patient/Patient Representative Signature      ..........................................................................................................................................  Patient Representative Print Name and Relationship to Patient    ..................................................               ................................................  Date                                   Time    ..........................................................................................................................................  Reviewed by Signature/Title    ...................................................              ..............................................  Date                                               Time          22EPIC Rev 08/18

## 2019-07-30 NOTE — ED NOTES
Bed: ED03  Expected date:   Expected time:   Means of arrival:   Comments:  E2  78 M confused/poss uti  1312

## 2019-07-30 NOTE — ED PROVIDER NOTES
"  History     Chief Complaint:  Altered Mental Status    History limited due to patient's altered mental status. History supplemented via EMS and patient's home health nurse.    HPI   Chance Shrestha is a 78 year old male with a complex past medical history including hypertension, hyperlipidemia, type II diabetes, coronary artery disease, atrial fibrillation, congestive heart failure, a hemorrhagic stroke with left sided deficit at baseline (40 years ago), and sepsis on coumadin who presents to the emergency department today for evaluation of altered mental status. Per EMS, the patient's home health nurse placed a call to 911 due to concern for a UTI given that the patient has developed foul smelling urine and recent confusion over the last two days.     The patient reports that he currently has a lot going on in life and is currently \"fighting many battles.\" He explains that he recently moved to the area after spending 20 years in [place] with intentions of returning, however due to multiple medical issues (e.coli infection, leg surgery) and his wife's transfer to memory care he has remained here.  He reports that he is typically wheelchair-bound.  He denies any chest pain or shortness of breath.  No recent surgery.  The patient denies any headache, loss of or double vision, urinary symptoms, or recent falls or injuries.     Allergies:  Oxycodone  Codeine     Medications:    Norvasc  Lipitor  Zetia  Amaryl  Synthroid  Lisinopril  Glucophage  Lopressor  Remeron  Zantac  Effexor  Coumdain    Past Medical History:    Depression  Type II diabetes  Gastroesophageal reflux disease  Hyperlipidemia  Hypertension   Hypothyroid  Ischemic heart disease  Seizures  Systolic congestive heart failure  Blood loss anemia  Osteomyelitis  GI bleed  Arteriosclerotic heart disease  [place]ida UTI  Atrial fibrillation  Cranial nerve VII palsy  Hemorrhagic stroke  Intramuscular hematoma  Left spastic hemiparesis  Idiopathic " "epilepsy  Peripheral vascular disease  Subarachnoid hemorrhage  Skin ulcer  Alcohol abuse  Vertigo  Kidney stones  Gall stones  Situational depression  Silent myocardial ischemia  Colon adenoma  Left ventricular hypertrophy  Hip arthritis  Coronary artery disease  Neuropathy  Sepsis with acute organ dysfunction due to MSSA  Diverticulosis  Renal artery stenosis    Past Surgical History:    IR lower extremity angiogram  Amputation of lesser toe of right foot  Amputation of right great toe  Back surgery  Removal of kidney stone  Coronary artery bypass graft  Sigmoidoscopy  [place] teeth extraction  Hernia repair  Neck surgery  Cataract removal  PTCA  Total hip arthroscopy  Lumbar laminectomy   Aortic valve replacement    Family History:    Brother: diabetes  Mother: respiratory disease  Sister: diabetes    Social History:  Patient's brother's name is Juventino.  Patient lives alone in an assisted living facility: [facility].   Patient's wife is in memory facility in [place].   Smoking Status: Never Smoker  Smokeless Tobacco: Never Used  Alcohol Use: Negative  Drug Use: Negative  PCP: Ernie Shrestha  Marital Status:        Review of Systems   Eyes: Negative for visual disturbance.   Genitourinary: Negative for difficulty urinating, dysuria, frequency and urgency.        Foul smelling urine   Neurological: Negative for headaches.   Psychiatric/Behavioral: Positive for confusion.       Physical Exam     Patient Vitals for the past 24 hrs:   BP Temp Temp src Pulse Heart Rate Resp SpO2 Height Weight   07/30/19 1630 (!) 136/90 -- -- -- 62 14 96 % -- --   07/30/19 1627 (!) 136/90 -- -- 65 -- -- -- -- --   07/30/19 1510 (!) 181/95 -- -- 64 -- -- 95 % -- --   07/30/19 1322 (!) 149/81 98.5  F (36.9  C) Oral -- 60 18 95 % 1.854 m (6' 1\") 88.5 kg (195 lb)     Physical Exam  General:  Sitting on bed.  Pleasant, talkative, no distress.  HENT:  No obvious trauma to head  Right Ear:  External ear normal.   Left Ear:  " External ear normal.   Nose:  Nose normal.   Eyes:  Conjunctivae and EOM are normal. Pupils are equal, round, and reactive.   Neck: Normal range of motion. Neck supple. No tracheal deviation present.   CV:  Normal heart sounds. No murmur heard.  Pulm/Chest: Effort normal and breath sounds normal.   Abd: Soft. No distension. There is no tenderness. There is no rigidity, no rebound and no guarding.   M/S: Normal range of motion.   Neuro: Alert. CN II-XII Grossly intact, no pronator drift, normal finger-nose-finger, visual fields intact by confrontation. Muscle strength is +5 proximal and distal in the right upper and lower extremity, and slightly weak on the left arm and leg (patient reports this is baseline). No dysarthria. Normal palm up, palm down.  Skin: Skin is warm and dry. No rash noted. Not diaphoretic.   Psych: Normal mood and affect. Behavior is normal.     Emergency Department Course     Imaging:  Radiology findings were communicated with the patient who voiced understanding of the findings.    Head CT w/o contrast  No acute intracranial abnormality. Volume loss and extensive white matter hypoattenuation likely reflecting chronic small vessel ischemic change or sequela of old insult, right greater the left, unchanged.  MICHA JESSICA MD  Reading per radiology    Laboratory:  Laboratory findings were communicated with the patient who voiced understanding of the findings.    CBC: WBC 6.8, HGB 12.0 (L),   INR: 2.82 (H)  BMP: Glucose 143 (H), Bun 31 (H), GFR Estimate 54 (L) o/w WNL (Creatinine 1.24)  UA with Microscopic: GLC 30 (A), Protein Albumin 10 (A), Mucous present (A), o/w WNL   Urine Culture: pending    Interventions:  1515  ml IV    Emergency Department Course:    1328 The patient provided a urine sample here in the emergency department. This was sent for laboratory testing, findings above.    1348 IV was inserted and blood was drawn for laboratory testing, results above.    1351 Nursing  notes and vitals reviewed.    1410 I performed an exam of the patient as documented above.     1417 The patient was sent for a head CT while in the emergency department, results above.     1630 patient reports he feels significantly better and desires to go home.  I reviewed all the results with him.  After receiving the fluids he felt better.    1630 Findings and plan explained to the patient. Patient discharged home with instructions regarding supportive care, medications, and reasons to return. The importance of close follow-up was reviewed.    Impression & Plan    Medical Decision Making:  Chance Shrestha is a very pleasant 78 year old male who presents to the emergency department today for evaluation of a possible UTI.  The patient's home health nurse thought that he may have a UTI because his urine smelled different.  The urinalysis has no evidence of infection.  He does not have any urinary tract infection symptoms.  The patient has a benign abdominal exam.  He has no tenderness to suggest intra-abdominal pathology such as appendicitis, diverticulitis, etc.  He is afebrile and has no leukocytosis to suggest an infectious etiology.  The home health nurse also thought that he was a little bit more confused from baseline over the past few days.  The patient denies this.  Patient is alert and oriented.  He does have slight left-sided weakness but reports this is his baseline for the past 40 years.  He is on Coumadin for mechanical valve.  Because of this and the report of possible confusion I did a CT to assess for intracranial bleed.  There is none seen.  He has no new focal neurologic deficit to suggest stroke.  He has no chest pain or any shortness of breath; being on warfarin, pulmonary embolism is unlikely.  He has no headache.  Subarachnoid hemorrhage is unlikely.  Basic labs are unremarkable, other than his BUN is slightly high and is slightly dehydrated.  He was provided IV fluids.  He felt well.  I  did review the risk and benefit of observation admission for continued evaluation and the patient declined.  He desired to go home.  His facility was contacted by the RN and they agreed.    The treatment plan was discussed with the patient and they expressed understanding of this plan and consented to the plan.  In addition, the patient will return to the emergency department if their symptoms persist, worsen, if new symptoms arise or if there is any concern as other pathology may be present that is not evident at this time. They also understand the importance of close follow up in the clinic and if unable to do so will return to the emergency department for a reevaluation. All questions were answered.    Diagnosis:    ICD-10-CM    1. Dehydration E86.0    2. Confusion R41.0      Disposition:   Discharge back to assisted living    Discharge Medications:     Review of your medicines      UNREVIEWED medicines. Ask your doctor about these medicines      Dose / Directions   amLODIPine 10 MG tablet  Commonly known as:  NORVASC      Dose:  10 mg  Take 10 mg by mouth daily  Refills:  0     atorvastatin 80 MG tablet  Commonly known as:  LIPITOR      Dose:  80 mg  Take 80 mg by mouth daily  Refills:  0     DAILY VITES Tabs      Dose:  1 tablet  Take 1 tablet by mouth daily  Refills:  0     enoxaparin 80 MG/0.8ML syringe  Commonly known as:  LOVENOX  Used for:  PAD (peripheral artery disease) (H), Warfarin anticoagulation      Dose:  80 mg  INJECT 0.8 MLS (80 MG) SUBCUTANEOUS 2 TIMES DAILY  Quantity:  12 Syringe  Refills:  0     ezetimibe 10 MG tablet  Commonly known as:  ZETIA  Used for:  Hyperlipidemia LDL goal <70      Dose:  10 mg  Take 1 tablet (10 mg) by mouth daily  Quantity:  90 tablet  Refills:  3     fluticasone 50 MCG/ACT nasal spray  Commonly known as:  FLONASE      Dose:  1 spray  Spray 1 spray into both nostrils daily  Refills:  0     glimepiride 1 MG tablet  Commonly known as:  AMARYL  Used for:  Type 2 diabetes  mellitus with foot ulcer, without long-term current use of insulin (H)      Dose:  1 mg  Take 1 tablet (1 mg) by mouth every morning (before breakfast)  Quantity:  90 tablet  Refills:  1     levothyroxine 25 MCG tablet  Commonly known as:  SYNTHROID/LEVOTHROID      Dose:  25 mcg  Take 25 mcg by mouth daily  Refills:  0     lisinopril 20 MG tablet  Commonly known as:  PRINIVIL/ZESTRIL      Dose:  20 mg  Take 20 mg by mouth daily  Refills:  0     metFORMIN 1000 MG tablet  Commonly known as:  GLUCOPHAGE      Dose:  1000 mg  Take 1,000 mg by mouth 2 times daily (with meals)  Refills:  0     metoprolol tartrate 100 MG tablet  Commonly known as:  LOPRESSOR      Dose:  100 mg  Take 100 mg by mouth 2 times daily  Refills:  0     mirtazapine 15 MG tablet  Commonly known as:  REMERON      Dose:  15 mg  Take 15 mg by mouth At Bedtime  Refills:  0     PHARBETOL 325 MG tablet  Generic drug:  acetaminophen      Dose:  650 mg  Take 650 mg by mouth  Refills:  0     polyethylene glycol powder  Commonly known as:  MIRALAX/GLYCOLAX      Dose:  17 g  Take 17 g by mouth daily as needed  Refills:  0     ranitidine 75 MG tablet  Commonly known as:  ZANTAC      Dose:  75 mg  Take 75 mg by mouth 2 times daily  Refills:  0     * venlafaxine 37.5 MG 24 hr capsule  Commonly known as:  EFFEXOR-XR      Dose:  37.5 mg  Take 37.5 mg by mouth daily With venlafaxine ER 75 mg  Refills:  0     * venlafaxine 75 MG 24 hr capsule  Commonly known as:  EFFEXOR-XR      Dose:  75 mg  Take 75 mg by mouth daily With venlafaxine er 37.5 mg  Refills:  0     vitamin D3 1000 units (25 mcg) tablet  Commonly known as:  CHOLECALCIFEROL      Dose:  5000 Units  Take 5,000 Units by mouth  Refills:  0     warfarin 7.5 MG tablet  Commonly known as:  COUMADIN      Dose:  7.5 mg  7.5 mg daily  Refills:  0         * This list has 2 medication(s) that are the same as other medications prescribed for you. Read the directions carefully, and ask your doctor or other care  provider to review them with you.            CONTINUE these medicines which have NOT CHANGED      Dose / Directions   order for DME  Used for:  Skin ulcer of toe of left foot with fat layer exposed (H), Type 2 diabetes mellitus with foot ulcer, unspecified whether long term insulin use (H)      [facility]  [place]:Phone: [phone] Fax: [phone]  [place]: Phone [phone] Fax [phone]  Please call Patient to schedule  Evaluate and Treat as indicated   Left Foot Modification to AFO or new AFO, non healing ulcer of left great toe and rubbing on left ankle  Quantity:  1 Device  Refills:  0            Scribe Disclosure:  I, Cristina Htach, am serving as a scribe at 2:15 PM on 7/30/2019 to document services personally performed by David Shaw DO based on my observations and the provider's statements to me.     EMERGENCY DEPARTMENT       David Shaw DO  07/30/19 8350

## 2019-07-31 LAB
BACTERIA SPEC CULT: NORMAL
Lab: NORMAL
SPECIMEN SOURCE: NORMAL

## 2019-08-01 NOTE — RESULT ENCOUNTER NOTE
Final urine culture report is NEGATIVE per [place] [facility] Result protocol.    If NEGATIVE result, no change in treatment, per [place] [facility] Lab Result protocol.

## 2019-08-02 ENCOUNTER — RECORDS - HEALTHEAST (OUTPATIENT)
Dept: LAB | Facility: CLINIC | Age: 79
End: 2019-08-02

## 2019-08-02 LAB
BASOPHILS # BLD AUTO: 0 THOU/UL (ref 0–0.2)
BASOPHILS NFR BLD AUTO: 1 % (ref 0–2)
C REACTIVE PROTEIN LHE: 0.4 MG/DL (ref 0–0.8)
EOSINOPHIL # BLD AUTO: 0 THOU/UL (ref 0–0.4)
EOSINOPHIL NFR BLD AUTO: 1 % (ref 0–6)
ERYTHROCYTE [DISTWIDTH] IN BLOOD BY AUTOMATED COUNT: 13.9 % (ref 11–14.5)
HCT VFR BLD AUTO: 34.3 % (ref 40–54)
HGB BLD-MCNC: 10.8 G/DL (ref 14–18)
LYMPHOCYTES # BLD AUTO: 1.7 THOU/UL (ref 0.8–4.4)
LYMPHOCYTES NFR BLD AUTO: 26 % (ref 20–40)
MCH RBC QN AUTO: 31 PG (ref 27–34)
MCHC RBC AUTO-ENTMCNC: 31.5 G/DL (ref 32–36)
MCV RBC AUTO: 99 FL (ref 80–100)
MONOCYTES # BLD AUTO: 0.4 THOU/UL (ref 0–0.9)
MONOCYTES NFR BLD AUTO: 7 % (ref 2–10)
NEUTROPHILS # BLD AUTO: 4.4 THOU/UL (ref 2–7.7)
NEUTROPHILS NFR BLD AUTO: 67 % (ref 50–70)
PLATELET # BLD AUTO: 314 THOU/UL (ref 140–440)
PMV BLD AUTO: 10.7 FL (ref 8.5–12.5)
RBC # BLD AUTO: 3.48 MILL/UL (ref 4.4–6.2)
WBC: 6.5 THOU/UL (ref 4–11)

## 2019-08-06 ENCOUNTER — RECORDS - HEALTHEAST (OUTPATIENT)
Dept: LAB | Facility: CLINIC | Age: 79
End: 2019-08-06

## 2019-08-07 LAB — INR PPP: 3.56 (ref 0.9–1.1)

## 2019-08-12 ENCOUNTER — HOSPITAL ENCOUNTER (OUTPATIENT)
Dept: WOUND CARE | Facility: CLINIC | Age: 79
Discharge: HOME | End: 2019-08-12
Attending: SURGERY | Admitting: SURGERY
Payer: MEDICARE

## 2019-08-12 VITALS — TEMPERATURE: 96.6 F | RESPIRATION RATE: 16 BRPM | SYSTOLIC BLOOD PRESSURE: 146 MMHG | DIASTOLIC BLOOD PRESSURE: 81 MMHG

## 2019-08-12 DIAGNOSIS — L97.522 SKIN ULCER OF TOE OF LEFT FOOT WITH FAT LAYER EXPOSED (H): ICD-10-CM

## 2019-08-12 DIAGNOSIS — L97.511 SKIN ULCER OF TOE OF RIGHT FOOT, LIMITED TO BREAKDOWN OF SKIN (H): ICD-10-CM

## 2019-08-12 DIAGNOSIS — I73.9 PAD (PERIPHERAL ARTERY DISEASE) (H): ICD-10-CM

## 2019-08-12 PROCEDURE — 11042 DBRDMT SUBQ TIS 1ST 20SQCM/<: CPT

## 2019-08-12 PROCEDURE — A6197 ALGINATE DRSG >16 <=48 SQ IN: HCPCS

## 2019-08-12 PROCEDURE — 99213 OFFICE O/P EST LOW 20 MIN: CPT | Mod: 25 | Performed by: SURGERY

## 2019-08-12 PROCEDURE — 11042 DBRDMT SUBQ TIS 1ST 20SQCM/<: CPT | Performed by: SURGERY

## 2019-08-12 PROCEDURE — 93923 UPR/LXTR ART STDY 3+ LVLS: CPT

## 2019-08-12 NOTE — DISCHARGE INSTRUCTIONS
[facility]  [address] [facility] [address], [place] [address]  Appointment Phone [phone] Nurse Advisors [phone]    Chance Shrestha      1940    [facility] Phone [phone] Fax [phone]    Wound Dressing Change to all ulcers on right toes  Cleanse wounds and surrounding skin with wound cleanser  Cover wound with [place] cut to fit the size of the wounds   Cover wounds with dry gauze  Change dressing every other day    Recommend bypass with vein mapping to be done at [facility]. Dr. Jones's surgery scheduler will call you for a date and time.      Alban Jones M.D.a August 12, 2019    Call us at [phone] if you have any questions about your wounds, have redness or swelling around your wound, have a fever of 101 or greater or if you have any other problems or concerns. We answer the phone Monday through Friday 8 am to 4 pm, please leave a message as we check the voicemail frequently throughout the day.     Follow up with Dr. Jones next available

## 2019-08-12 NOTE — PROGRESS NOTES
[facility] [place] Progress Note    Subject: Chance Shrestha returns for his bilaterally toe ulcerations much worse in the left than right side.  Known PAD.  Angiography was performed on 7/17/2019.  This revealed bilateral trifurcation disease.  On the right the peroneal artery was patent and relatively free of disease collateralizing to the posterior tibial at the level of the ankle.  On his more symptomatic left side again severe trifurcation disease.  Peroneal artery was the only runoff vessel that had several high-grade proximal stenoses that were angioplastied.  This had collateralizations to the distal anterior tibial and dorsalis pedis artery at the level of the ankle.  It was questionable whether the left peroneal angioplasty would be enough for wound healing.    Patient has had the left calf greater saphenous vein harvest for his coronary bypass surgery.    Since his last visit the ulcerations on the right have essentially healed over.  However, we are now seeing ulcerations not only on the left great toe x2 but now on the dorsal second and fourth toes and fifth toes.    Patient was in the emergency department for dehydration and a probable UTI on 7/30/2019.  He was confused at that time and a CT scan of his head was negative.  Final urine cultures did not document the UTI.  He was discharged from the emergency department is done well since.    Patient Active Problem List   Diagnosis     Accelerated hypertension     Acquired hypothyroidism     Acute blood loss anemia     Acute osteomyelitis of right foot (H)     Acute upper GI bleed     ASHD (arteriosclerotic heart disease)     Candida UTI     Atrial fibrillation with RVR (H)     Cranial nerve VII palsy     Cognitive disorder     Gastrointestinal hemorrhage associated with duodenal ulcer     GERD with stricture     H/O mechanical aortic valve replacement     Hemorrhagic stroke (H)     Hypokalemia     Imbalance     Intramuscular hematoma      Left spastic hemiparesis (H)     Nonintractable generalized idiopathic epilepsy without status epilepticus (H)     PVD (peripheral vascular disease) (H)     SAH (subarachnoid hemorrhage) (H)     Skin ulcer of toe of right foot, limited to breakdown of skin (H)     Status post amputation of right great toe (H)     Tobacco use     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)     Uncomplicated alcohol abuse     Vertigo as late effect of stroke     Warfarin anticoagulation     Skin ulcer of toe of left foot with fat layer exposed (H)     PAD (peripheral artery disease) (H)     Past Medical History:   Diagnosis Date     Constipation      Depression      DM type 2 (diabetes mellitus, type 2) (H)      GERD (gastroesophageal reflux disease)      HLD (hyperlipidemia)      HTN (hypertension)      Hypothyroid      Ischemic heart disease      Seizures (H)      Systolic CHF (H)      Exam:  BP (!) 146/81 (BP Location: Right arm)   Temp 96.6  F (35.9  C) (Temporal)   Resp 16   Wound (used by [facility] only) 06/12/19 0935 Left plantar 1 toe ulceration, arterial (Active)   Length (cm) 0.9 8/12/2019 10:00 AM   Width (cm) 0.9 8/12/2019 10:00 AM   Depth (cm) 0.4 8/12/2019 10:00 AM   Wound (cm^2) 0.81 cm^2 8/12/2019 10:00 AM   Wound Volume (cm^3) 0.32 cm^3 8/12/2019 10:00 AM   Wound healing % 30.77 8/12/2019 10:00 AM   Dressing Appearance moist drainage 8/12/2019 10:00 AM   Drainage Characteristics/Odor serosanguineous 8/12/2019 10:00 AM   Drainage Amount moderate 8/12/2019 10:00 AM       Wound (used by OP I only) 08/12/19 1020 Left dorsal 1 toe trauma (Active)   Length (cm) 0.4 8/12/2019 10:00 AM   Width (cm) 0.9 8/12/2019 10:00 AM   Depth (cm) 0.2 8/12/2019 10:00 AM   Wound (cm^2) 0.36 cm^2 8/12/2019 10:00 AM   Wound Volume (cm^3) 0.07 cm^3 8/12/2019 10:00 AM   Dressing Appearance moist drainage 8/12/2019 10:00 AM   Drainage Characteristics/Odor serosanguineous 8/12/2019 10:00 AM   Drainage Amount moderate 8/12/2019 10:00 AM        Wound (used by OP I only) 08/12/19 1021 Left 2 toe trauma (Active)   Length (cm) 1.4 8/12/2019 10:00 AM   Width (cm) 1.5 8/12/2019 10:00 AM   Depth (cm) 0.1 8/12/2019 10:00 AM   Wound (cm^2) 2.1 cm^2 8/12/2019 10:00 AM   Wound Volume (cm^3) 0.21 cm^3 8/12/2019 10:00 AM   Dressing Appearance moist drainage 8/12/2019 10:00 AM   Drainage Characteristics/Odor serosanguineous 8/12/2019 10:00 AM   Drainage Amount moderate 8/12/2019 10:00 AM       Wound (used by OP I only) 08/12/19 1021 Left 3 toe trauma (Active)   Length (cm) 0.5 8/12/2019 10:00 AM   Width (cm) 1.3 8/12/2019 10:00 AM   Depth (cm) 0.1 8/12/2019 10:00 AM   Wound (cm^2) 0.65 cm^2 8/12/2019 10:00 AM   Wound Volume (cm^3) 0.06 cm^3 8/12/2019 10:00 AM   Dressing Appearance moist drainage 8/12/2019 10:00 AM   Drainage Characteristics/Odor serosanguineous 8/12/2019 10:00 AM   Drainage Amount moderate 8/12/2019 10:00 AM       [facility] 08/12/19 1021 Left 4 toe trauma (Active)   Length (cm) 0.5 8/12/2019 10:00 AM   Width (cm) 0.5 8/12/2019 10:00 AM   Depth (cm) 0.1 8/12/2019 10:00 AM   Wound (cm^2) 0.25 cm^2 8/12/2019 10:00 AM   Wound Volume (cm^3) 0.02 cm^3 8/12/2019 10:00 AM   Dressing Appearance moist drainage 8/12/2019 10:00 AM   Drainage Characteristics/Odor serosanguineous 8/12/2019 10:00 AM   Drainage Amount moderate 8/12/2019 10:00 AM     Alert and appropriate.  Chest= clear  Scab is noted over the right fifth toe with no underlying ulcer.  Also small scab on the medial instep.  Recent abrasion from earlier today from his wheelchair over the dorsal right foot of no concern.  Ulcerations on the left great toe x2 and then the dorsal left second and fourth toes with overlying slough and fibrin.  No obvious infection.  Size as above.    Site Doppler reveals a fairly strong monophasic signal in the right posterior tibial and anterior tibial arteries but a weaker left dorsalis pedis artery monophasic flow.    Procedure:   Patient was  determined to be capable of making their own medical decisions and informed consent was obtained. Topical anesthetic of 4% lidocaine was applied, debridement was performed using a #15 blade down to and including subcutaneous tissue.  All slough and fibrin removed from the 4 ulcerations on the left toes with slight debridement of the skin edges.  Bleeding was appreciated.  No bone involvement.  Bleeding controlled with light pressure. Patient tolerated procedure well.    Scab removed from right fifth toe with healed underlying skin.    Impression: Worsening of the ulcerations to his left toes now with 2 new ulcers.  The peroneal blood supply is not adequate and I am worried that without improving blood supply there is a high chance that he will go on to amputation.  I thus feel that we should consider but we discussed earlier of the left below-knee popliteal to dorsalis pedis vein bypass graft.  We will map the left thigh greater saphenous vein to see if this is adequate and also the right leg greater saphenous veins.    This is been discussed with the patient and he understands and is willing to proceed.  We will need to hold his Coumadin on with bridging Lovenox as we did for his angiogram prior to the procedure.    Until that time patient will continue with dressing changes using silver cell.    Plan: We will dress the wounds with silver cell pending decision for bypass..  Patient will return to the clinic in 2 weeks time    I did review the angiogram images again today and planning for his bypass procedure.  Spent over 20 minutes with the patient today with 50% counseling due to the change in his situation in the left foot now requiring more aggressive treatment.    Alban Jones MD on 8/12/2019 at 10:28 AM

## 2019-08-13 ENCOUNTER — RECORDS - HEALTHEAST (OUTPATIENT)
Dept: LAB | Facility: CLINIC | Age: 79
End: 2019-08-13

## 2019-08-14 LAB — INR PPP: 4.45 (ref 0.9–1.1)

## 2019-08-15 ENCOUNTER — TELEPHONE (OUTPATIENT)
Dept: OTHER | Facility: CLINIC | Age: 79
End: 2019-08-15

## 2019-08-21 ENCOUNTER — TELEPHONE (OUTPATIENT)
Dept: WOUND CARE | Facility: CLINIC | Age: 79
End: 2019-08-21

## 2019-08-21 NOTE — TELEPHONE ENCOUNTER
Reason for Call:  Britt from [facility] care agency called regarding needing to decrease visit frequency to MWF as they have been doing every other day. Wound has improved.      LVM and gave verbal order to decrease visit.     Pt Provider: Alban Jones M.D.     Phone Number can be reached at: [phone]     Can we leave a detailed message on this number? YES or NO      Silvia Robbins RN on 8/21/2019 at 4:45 PM

## 2019-08-23 ENCOUNTER — RECORDS - HEALTHEAST (OUTPATIENT)
Dept: LAB | Facility: CLINIC | Age: 79
End: 2019-08-23

## 2019-08-23 LAB — INR PPP: 2.53 (ref 0.9–1.1)

## 2019-08-28 ENCOUNTER — RECORDS - HEALTHEAST (OUTPATIENT)
Dept: LAB | Facility: CLINIC | Age: 79
End: 2019-08-28

## 2019-08-28 LAB — INR PPP: 2.42 (ref 0.9–1.1)

## 2019-08-29 LAB — HBA1C MFR BLD: 8.5 % (ref 4.2–6.1)

## 2019-09-09 ENCOUNTER — RECORDS - HEALTHEAST (OUTPATIENT)
Dept: LAB | Facility: CLINIC | Age: 79
End: 2019-09-09

## 2019-09-09 LAB — INR PPP: 1.54 (ref 0.9–1.1)

## 2019-09-16 ENCOUNTER — HOSPITAL ENCOUNTER (OUTPATIENT)
Dept: WOUND CARE | Facility: CLINIC | Age: 79
Discharge: HOME | End: 2019-09-16
Attending: SURGERY | Admitting: SURGERY
Payer: MEDICARE

## 2019-09-16 VITALS — SYSTOLIC BLOOD PRESSURE: 119 MMHG | TEMPERATURE: 96.6 F | DIASTOLIC BLOOD PRESSURE: 76 MMHG | HEART RATE: 61 BPM

## 2019-09-16 DIAGNOSIS — L97.522 SKIN ULCER OF TOE OF LEFT FOOT WITH FAT LAYER EXPOSED (H): ICD-10-CM

## 2019-09-16 DIAGNOSIS — I73.9 PAD (PERIPHERAL ARTERY DISEASE) (H): ICD-10-CM

## 2019-09-16 DIAGNOSIS — L89.893 PRESSURE ULCER OF DORSUM OF RIGHT FOOT, STAGE 3 (H): ICD-10-CM

## 2019-09-16 DIAGNOSIS — L97.511 SKIN ULCER OF TOE OF RIGHT FOOT, LIMITED TO BREAKDOWN OF SKIN (H): ICD-10-CM

## 2019-09-16 PROCEDURE — 11042 DBRDMT SUBQ TIS 1ST 20SQCM/<: CPT

## 2019-09-16 PROCEDURE — 11042 DBRDMT SUBQ TIS 1ST 20SQCM/<: CPT | Performed by: SURGERY

## 2019-09-16 PROCEDURE — A6197 ALGINATE DRSG >16 <=48 SQ IN: HCPCS

## 2019-09-16 PROCEDURE — 99212 OFFICE O/P EST SF 10 MIN: CPT | Mod: 25 | Performed by: SURGERY

## 2019-09-16 NOTE — DISCHARGE INSTRUCTIONS
[facility]  [address] [facility] [address], [place] [address]  Appointment Phone [phone] Nurse Advisors [phone]    Chance Shrestha 1940    [facility] Care Phone [phone] Fax [phone]    Wound Dressing Change to all ulcers on left plantar toe, right dorsal foot and right 2nd toe  Cleanse wounds and surrounding skin with wound cleanser  Cover wound with Silvercel cut to fit the size of the wounds  Cover wounds with bandaids  Change dressing Monday, Wednesday and Friday    Alban Jones M.D. September 16, 2019    Call us at [phone] if you have any questions about your wounds, have redness or  swelling around your wound, have a fever of 101 or greater or if you have any other  problems or concerns. We answer the phone Monday through Friday 8 am to 4 pm,  please leave a message as we check the voicemail frequently throughout the day.    Follow up with Dr. Jones 3-4 weeks

## 2019-09-16 NOTE — PROGRESS NOTES
[facility] [place] Progress Note    Subject: Chance Shrestha returns for follow-up of his foot/toe ulcers.  He has been applying silver cell to the sites in the care facility.  Improvement has occurred.    Angiography was performed on 7/17/2019 revealed bilateral trifurcation disease.  On the right the peroneal artery was patent with collateralization to the posterior tibial at the level of the ankle.  In the more symptomatic left side the peroneal artery is the only runoff was angioplastied with collateralization the distal anterior tibial artery/dorsalis pedis are to the level of the ankle.  We are unclear whether this would be enough for healing to occur.  This is particular related to the left great toe.  There is discussed the possibility of performing a bypass from the left below-knee popliteal artery dorsalis pedis artery using either the remaining left thigh greater saphenous vein or right greater saphenous vein.    On his last visit on 8/12/2019 he did had an abrasion from his which on the dorsal right forefoot.    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)     PVD (peripheral vascular disease) (H)     SAH (subarachnoid hemorrhage) (H)     Skin ulcer of toe of right foot, limited to breakdown of skin (H)     Status post amputation of right great toe (H)     Tobacco use      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)     Uncomplicated alcohol abuse     Vertigo as late effect of stroke     Warfarin anticoagulation     Skin ulcer of toe of left foot with fat layer exposed (H)     PAD (peripheral artery disease) (H)     Past Medical History:   Diagnosis Date     Constipation      Depression      DM type 2 (diabetes mellitus, type 2) (H)      GERD (gastroesophageal reflux disease)      HLD (hyperlipidemia)      HTN (hypertension)      Hypothyroid      Ischemic heart disease      Seizures (H)      Systolic CHF (H)      Exam:  /76   Pulse 61   Temp 96.6  F (35.9  C) (Tympanic)   Wound (used by [facility] only) 06/12/19 0935 Left plantar 1 toe ulceration, arterial (Active)   Length (cm) 0.5 9/16/2019 10:24 AM   Width (cm) 0.6 9/16/2019 10:24 AM   Depth (cm) 0.3 9/16/2019 10:24 AM   Wound (cm^2) 0.3 cm^2 9/16/2019 10:24 AM   Wound Volume (cm^3) 0.09 cm^3 9/16/2019 10:24 AM   Wound healing % 74.36 9/16/2019 10:24 AM   Dressing Appearance moist drainage 9/16/2019 10:24 AM   Drainage Characteristics/Odor serosanguineous 9/16/2019 10:24 AM   Drainage Amount moderate 9/16/2019 10:24 AM       Wound (used by [facility] only) 09/16/19 1025 Right dorsal foot (Active)   Length (cm) 1.5 9/16/2019 10:24 AM   Width (cm) 0.5 9/16/2019 10:24 AM   Depth (cm) 0 9/16/2019 10:24 AM   Wound (cm^2) 0.75 cm^2 9/16/2019 10:24 AM   Wound Volume (cm^3) 0 cm^3 9/16/2019 10:24 AM   Dressing Appearance moist drainage 9/16/2019 10:24 AM   Drainage Characteristics/Odor serosanguineous 9/16/2019 10:24 AM   Drainage Amount moderate 9/16/2019 10:24 AM       Wound (used by [facility] only) 09/16/19 1027 Right 2 toe (Active)   Length (cm) 0.7 9/16/2019 10:24 AM   Width (cm) 0.3 9/16/2019 10:24 AM   Depth (cm) 0 9/16/2019 10:24 AM   Wound (cm^2) 0.21 cm^2 9/16/2019 10:24 AM   Wound Volume (cm^3) 0 cm^3 9/16/2019 10:24 AM   Dressing Appearance moist drainage 9/16/2019 10:24 AM   Drainage Characteristics/Odor serosanguineous  9/16/2019 10:24 AM   Drainage Amount moderate 9/16/2019 10:24 AM     Alert and appropriate.  Very comfortable.  We do see improvement of the ulcers.  Right dorsal foot measures 1.5 x 0.5 with no depth and good granulation.  Right second toe amputation ulcer measures 0.7 x 0.3 with minimal depth.  Left great toe measures 0.5 x 0.6 x 0.3 cm.  No evidence of infection.  No deep undermining.  Toenails are quite thickened bilaterally.    Procedure:   Patient was determined to be capable of making their own medical decisions and informed consent was obtained. Topical anesthetic of 4% lidocaine was applied, debridement was performed using a #15 blade down to and including subcutaneous tissue.  All sites were debrided with good healthy bleeding tissue following excision of the skin edges circumferentially down to healthy tissue.  All wounds are relatively superficial.  In particular we do see improvement of the great toe.  Bleeding controlled with light pressure. Patient tolerated procedure well.    I also trimmed his long toenails bilaterally thick  and emery board (no charge)    Impression: Improvement is noted particular the dorsal medial left great toe.  The angioplasty may have been adequate.  Because of the improvement we will hold off on the bypass procedure and discuss this with the patient.    Plan: We will dress the wounds with Aquacel Ag..  Patient will return to the clinic in 3-4 weeks time    Alban Jones MD on 9/16/2019 at 10:39 AM

## 2019-09-17 ENCOUNTER — HOSPITAL ENCOUNTER (EMERGENCY)
Facility: CLINIC | Age: 79
Discharge: HOME | End: 2019-09-18
Attending: EMERGENCY MEDICINE | Admitting: EMERGENCY MEDICINE
Payer: MEDICARE

## 2019-09-17 ENCOUNTER — TELEPHONE (OUTPATIENT)
Dept: OTHER | Facility: CLINIC | Age: 79
End: 2019-09-17

## 2019-09-17 ENCOUNTER — RECORDS - HEALTHEAST (OUTPATIENT)
Dept: LAB | Facility: CLINIC | Age: 79
End: 2019-09-17

## 2019-09-17 VITALS
OXYGEN SATURATION: 95 % | HEART RATE: 67 BPM | TEMPERATURE: 98.7 F | HEIGHT: 73 IN | DIASTOLIC BLOOD PRESSURE: 96 MMHG | WEIGHT: 185 LBS | BODY MASS INDEX: 24.52 KG/M2 | RESPIRATION RATE: 15 BRPM | SYSTOLIC BLOOD PRESSURE: 172 MMHG
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
## 2019-09-17 LAB
BASOPHILS # BLD AUTO: 0 10E9/L (ref 0–0.2)
BASOPHILS NFR BLD AUTO: 0.2 %
DIFFERENTIAL METHOD BLD: ABNORMAL
EOSINOPHIL # BLD AUTO: 0.1 10E9/L (ref 0–0.7)
EOSINOPHIL NFR BLD AUTO: 0.8 %
ERYTHROCYTE [DISTWIDTH] IN BLOOD BY AUTOMATED COUNT: 13.7 % (ref 10–15)
HCT VFR BLD AUTO: 38 % (ref 40–53)
HGB BLD-MCNC: 12.5 G/DL (ref 13.3–17.7)
IMM GRANULOCYTES # BLD: 0 10E9/L (ref 0–0.4)
IMM GRANULOCYTES NFR BLD: 0.2 %
INR PPP: 3.56 (ref 0.9–1.1)
LYMPHOCYTES # BLD AUTO: 1.7 10E9/L (ref 0.8–5.3)
LYMPHOCYTES NFR BLD AUTO: 18.1 %
MCH RBC QN AUTO: 30.8 PG (ref 26.5–33)
MCHC RBC AUTO-ENTMCNC: 32.9 G/DL (ref 31.5–36.5)
MCV RBC AUTO: 94 FL (ref 78–100)
MONOCYTES # BLD AUTO: 0.7 10E9/L (ref 0–1.3)
MONOCYTES NFR BLD AUTO: 7.4 %
NEUTROPHILS # BLD AUTO: 6.7 10E9/L (ref 1.6–8.3)
NEUTROPHILS NFR BLD AUTO: 73.3 %
NRBC # BLD AUTO: 0 10*3/UL
NRBC BLD AUTO-RTO: 0 /100
PLATELET # BLD AUTO: 282 10E9/L (ref 150–450)
RBC # BLD AUTO: 4.06 10E12/L (ref 4.4–5.9)
WBC # BLD AUTO: 9.4 10E9/L (ref 4–11)

## 2019-09-17 PROCEDURE — 85025 COMPLETE CBC W/AUTO DIFF WBC: CPT | Performed by: EMERGENCY MEDICINE

## 2019-09-17 PROCEDURE — 99283 EMERGENCY DEPT VISIT LOW MDM: CPT

## 2019-09-17 PROCEDURE — 80048 BASIC METABOLIC PNL TOTAL CA: CPT | Performed by: EMERGENCY MEDICINE

## 2019-09-17 ASSESSMENT — MIFFLIN-ST. JEOR: SCORE: 1598.03

## 2019-09-17 NOTE — TELEPHONE ENCOUNTER
Patient called with concerns about medications he was billed for after his hospital visit on 7/17/19.    I questioned the patient further.  He does not remember either of his two ED visits in July.  I noticed that he was admitted for dehydration and confusion in July so I called the assisted living facility ([facility]) and spoke to someone responsible for his care.    The nurse stated that she would go and assess Chance and explain to him that the medications are now managed by [facility].    Destiny Betancourt RN BSN  [facility]

## 2019-09-17 NOTE — ED AVS SNAPSHOT
Emergency Department  [address] [place] [address]  Phone:  [phone]  Fax:  [phone]                                    Chance Shrestha   MRN: [id number]    Department:   Emergency Department   Date of Visit:  9/17/2019           After Visit Summary Signature Page    I have received my discharge instructions, and my questions have been answered. I have discussed any challenges I see with this plan with the nurse or doctor.    ..........................................................................................................................................  Patient/Patient Representative Signature      ..........................................................................................................................................  Patient Representative Print Name and Relationship to Patient    ..................................................               ................................................  Date                                   Time    ..........................................................................................................................................  Reviewed by Signature/Title    ...................................................              ..............................................  Date                                               Time          22EPIC Rev 08/18

## 2019-09-18 LAB
ANION GAP SERPL CALCULATED.3IONS-SCNC: 8 MMOL/L (ref 3–14)
BUN SERPL-MCNC: 26 MG/DL (ref 7–30)
CALCIUM SERPL-MCNC: 9.6 MG/DL (ref 8.5–10.1)
CHLORIDE SERPL-SCNC: 102 MMOL/L (ref 94–109)
CO2 SERPL-SCNC: 25 MMOL/L (ref 20–32)
CREAT SERPL-MCNC: 1.1 MG/DL (ref 0.66–1.25)
GFR SERPL CREATININE-BSD FRML MDRD: 63 ML/MIN/{1.73_M2}
GLUCOSE SERPL-MCNC: 246 MG/DL (ref 70–99)
POTASSIUM SERPL-SCNC: 4.1 MMOL/L (ref 3.4–5.3)
SODIUM SERPL-SCNC: 135 MMOL/L (ref 133–144)

## 2019-09-18 ASSESSMENT — ENCOUNTER SYMPTOMS
DIARRHEA: 0
VOMITING: 1
COUGH: 0
FREQUENCY: 0
DIFFICULTY URINATING: 0
DYSURIA: 0
NAUSEA: 0
FEVER: 0

## 2019-09-18 NOTE — ED TRIAGE NOTES
"Pt lives at assisted living, states he seems \"off\" and confused. Found on floor of bathroom, pt states he did not fall, no LOC, and no hit to the head. No pain. Vertigo shortly after and vomited once. Pt states he couldn't sleep a couple nights ago and has felt exhausted.  "

## 2019-09-18 NOTE — ED PROVIDER NOTES
History     Chief Complaint:  Altered Mental Status       HPI   Chance Shrestha is a 79 year old male blood thinned on Coumadin with a history of T2DM, hypertension, hyperlipidemia, A fib and left spastic hemiparesis who presents with altered mental status. The patient reports that at approximately 2100 he and his wife came home after dinner which included a brownie for dessert. The patient was sitting on the toilet when he realized that a crumb from the brownie had fallen from his pants onto the floor. The patient decided to lean over and clean it up when he just continued to fall towards the floor. The patient was able to catch himself and bring himself to the ground slowly and denied hitting his head, losing consciousness or hitting anything else. The patient was unable to get himself off of the floor as he felt like his legs were buckling under him. So the patient called for staff from his assisted living to help him get up. After getting him up they recommended presentation to the ED. While the staff was getting him ready to come to the ED, he began to feel nauseous and had one episode of emesis. The patient stated that since sleeping poorly a couple of nights ago he has felt fatigued and experienced some vertigo. On examination the patient states that he feels good. He denied any cough, fever, diarrhea, nausea, dysuria, difficulty urinating or increased urinary frequency. Of note, the patient uses a wheelchair to get around however he is able to transfer in and out of bed and on and off the toilet on his own.     Allergies:  Oxycodone   Codeine      Medications:    Pharbetol   Norvasc   Lipitor   Lovenox   Zetia   Flonase   Glimepiride   Levothyroxine   Lisinopril   Metformin   Lopressor   Mirtazapine   Zantac   Venlafaxine   Coumadin    Past Medical History:    PAD   Arteriosclerotic heart disease   GERD   Hemorrhagic stroke   PVD  Hypokalemia   T2DM  hypertension  Left spastic hemiparesis   Atrial  "fibrillation with RVR   hyperlipidemia  Seizures   Systolic CHF     Past Surgical History:    IR lower extremity angiogram bilateral   Hip replacement     Family History:    No past pertinent family history.    Social History:  Negative for tobacco use.  Negative for drug use.  Marital Status:   [2]     Review of Systems   Constitutional: Negative for fever.   Respiratory: Negative for cough.    Gastrointestinal: Positive for vomiting. Negative for diarrhea and nausea.   Genitourinary: Negative for difficulty urinating, dysuria and frequency.   All other systems reviewed and are negative.        Physical Exam     Patient Vitals for the past 24 hrs:   BP Temp Temp src Pulse Heart Rate Resp SpO2 Height Weight   09/17/19 2330 (!) 172/96 -- -- 67 67 15 95 % -- --   09/17/19 2223 (!) 187/101 98.7  F (37.1  C) Oral -- 62 16 94 % 1.854 m (6' 1\") 83.9 kg (185 lb)       Physical Exam  General: Appears well-developed and well-nourished.   Head: No signs of trauma.   Mouth/Throat: Oropharynx is clear and moist.   Eyes: Conjunctivae are normal.  Neck: Normal range of motion. No nuchal rigidity. No cervical adenopathy  CV: Normal rate and regular rhythm.    Resp: Effort normal and breath sounds normal. No respiratory distress.   GI: Soft. There is no tenderness.  No rebound or guarding.  Normal bowel sounds.  No CVA tenderness.  MSK: Normal range of motion. no edema. No Calf tenderness.  Neuro: The patient is alert and oriented to person, place, and time.  PERRLA, EOMI, strength in upper/lower extremities normal and with left sided slightly more weak than right, at baseline.   Sensation normal. Speech normal.  GCS 15  Skin: Skin is warm and dry. No rash noted.   Psych: normal mood and affect. behavior is normal.       Emergency Department Course   Laboratory:  CBC: WBC: 9.4, HGB: 12.5 (L), PLT: 282  BMP: Glucose 246 (H), o/w WNL (Creatinine: 1.10)    Emergency Department Course:  Nursing notes and vitals reviewed. (2234) " I performed an exam of the patient as documented above.     IV inserted. Blood drawn. This was sent to the lab for further testing, results above.    (0028) I rechecked the patient and discussed the results of his workup thus far.     Findings and plan explained to the Patient. Patient discharged home with instructions regarding supportive care, medications, and reasons to return. The importance of close follow-up was reviewed.     I personally reviewed the laboratory results with the Patient and answered all related questions prior to discharge.     Impression & Plan    Medical Decision Making:  Chance Shrestha is an 79-year-old gentleman who presents after slipping from the toilet.  He reports that he was sitting on the toilet when he bent over to pick something up and he slid off of the toilet.  He denies falling, hitting his head, or any other injuries.  Patient uses a wheelchair to get around and was unable to get himself up.  When he called for help, the assisted living facility apparently called 911.  Patient reports with all the commotion he did get somewhat dizzy and had an episode of vomiting but on my evaluation stated he had no symptoms and felt quite well.  His exam was benign.  He does have some chronic left-sided weakness which is baseline.  Basic blood work was obtained that was non-concerning and he continued to have no complaints throughout his time in the ER.  Patient requested dischare and was discharged back to the nursing home where he will continue to have support and instructed to follow with his primary care doctor and to return to the ER for any further concerns.    Diagnosis:    ICD-10-CM    1. Fall from toilet seat, initial encounter W18.11XA        Disposition:  discharged to home    Scribe Disclosure:  ZELALEM, Zari Galeano, am serving as a scribe on 9/17/2019 at 10:34 PM to personally document services performed by Donnie Gamez MD based on my observations and the provider's statements  to me.     Zari Galeano  9/17/2019    EMERGENCY DEPARTMENT       [facility] Hospital[facility] MD  09/18/19 0257

## 2019-09-25 ENCOUNTER — RECORDS - HEALTHEAST (OUTPATIENT)
Dept: LAB | Facility: CLINIC | Age: 79
End: 2019-09-25

## 2019-09-25 LAB — INR PPP: 3.72 (ref 0.9–1.1)

## 2019-10-03 ENCOUNTER — RECORDS - HEALTHEAST (OUTPATIENT)
Dept: LAB | Facility: CLINIC | Age: 79
End: 2019-10-03

## 2019-10-03 LAB — INR PPP: 3.31 (ref 0.9–1.1)

## 2019-10-05 ENCOUNTER — APPOINTMENT (OUTPATIENT)
Dept: CT IMAGING | Facility: CLINIC | Age: 79
End: 2019-10-05
Attending: EMERGENCY MEDICINE
Payer: MEDICARE

## 2019-10-05 ENCOUNTER — HOSPITAL ENCOUNTER (EMERGENCY)
Facility: CLINIC | Age: 79
Discharge: HOME | End: 2019-10-05
Attending: EMERGENCY MEDICINE | Admitting: EMERGENCY MEDICINE
Payer: MEDICARE

## 2019-10-05 VITALS
TEMPERATURE: 97.8 F | HEART RATE: 63 BPM | HEIGHT: 72 IN | BODY MASS INDEX: 25.06 KG/M2 | WEIGHT: 185 LBS | RESPIRATION RATE: 15 BRPM | DIASTOLIC BLOOD PRESSURE: 79 MMHG | OXYGEN SATURATION: 93 % | SYSTOLIC BLOOD PRESSURE: 149 MMHG

## 2019-10-05 DIAGNOSIS — N23 RENAL COLIC: ICD-10-CM

## 2019-10-05 LAB
ALBUMIN UR-MCNC: 30 MG/DL
APPEARANCE UR: CLEAR
BILIRUB UR QL STRIP: NEGATIVE
COLOR UR AUTO: YELLOW
CREAT BLD-MCNC: 1.6 MG/DL (ref 0.66–1.25)
GFR SERPL CREATININE-BSD FRML MDRD: 42 ML/MIN/{1.73_M2}
GLUCOSE UR STRIP-MCNC: 150 MG/DL
HGB UR QL STRIP: ABNORMAL
KETONES UR STRIP-MCNC: NEGATIVE MG/DL
LEUKOCYTE ESTERASE UR QL STRIP: ABNORMAL
MUCOUS THREADS #/AREA URNS LPF: PRESENT /LPF
NITRATE UR QL: NEGATIVE
PH UR STRIP: 5.5 PH (ref 5–7)
RBC #/AREA URNS AUTO: 21 /HPF (ref 0–2)
SOURCE: ABNORMAL
SP GR UR STRIP: 1.02 (ref 1–1.03)
SQUAMOUS #/AREA URNS AUTO: 1 /HPF (ref 0–1)
UROBILINOGEN UR STRIP-MCNC: NORMAL MG/DL (ref 0–2)
WBC #/AREA URNS AUTO: 5 /HPF (ref 0–5)

## 2019-10-05 PROCEDURE — 25000132 ZZH RX MED GY IP 250 OP 250 PS 637: Mod: GY | Performed by: EMERGENCY MEDICINE

## 2019-10-05 PROCEDURE — 74176 CT ABD & PELVIS W/O CONTRAST: CPT

## 2019-10-05 PROCEDURE — 81001 URINALYSIS AUTO W/SCOPE: CPT | Performed by: EMERGENCY MEDICINE

## 2019-10-05 PROCEDURE — 25000131 ZZH RX MED GY IP 250 OP 636 PS 637: Mod: GY | Performed by: EMERGENCY MEDICINE

## 2019-10-05 PROCEDURE — 82565 ASSAY OF CREATININE: CPT

## 2019-10-05 PROCEDURE — 99285 EMERGENCY DEPT VISIT HI MDM: CPT | Mod: 25

## 2019-10-05 RX ORDER — ONDANSETRON 4 MG/1
4 TABLET, ORALLY DISINTEGRATING ORAL EVERY 6 HOURS PRN
Qty: 10 TABLET | Refills: 0 | Status: SHIPPED | OUTPATIENT
Start: 2019-10-05

## 2019-10-05 RX ORDER — ONDANSETRON 4 MG/1
4 TABLET, ORALLY DISINTEGRATING ORAL ONCE
Status: COMPLETED | OUTPATIENT
Start: 2019-10-05 | End: 2019-10-05

## 2019-10-05 RX ORDER — ACETAMINOPHEN 500 MG
1000 TABLET ORAL ONCE
Status: COMPLETED | OUTPATIENT
Start: 2019-10-05 | End: 2019-10-05

## 2019-10-05 RX ORDER — TRAMADOL HYDROCHLORIDE 50 MG/1
50 TABLET ORAL EVERY 6 HOURS PRN
Qty: 10 TABLET | Refills: 0 | Status: SHIPPED | OUTPATIENT
Start: 2019-10-05 | End: 2019-10-30

## 2019-10-05 RX ADMIN — ONDANSETRON 4 MG: 4 TABLET, ORALLY DISINTEGRATING ORAL at 14:11

## 2019-10-05 RX ADMIN — ACETAMINOPHEN 1000 MG: 500 TABLET, FILM COATED ORAL at 14:11

## 2019-10-05 ASSESSMENT — ENCOUNTER SYMPTOMS
VOMITING: 0
FREQUENCY: 0
DYSURIA: 0
NAUSEA: 1
FEVER: 0
FLANK PAIN: 1
HEMATURIA: 0

## 2019-10-05 ASSESSMENT — MIFFLIN-ST. JEOR: SCORE: 1582.15

## 2019-10-05 NOTE — ED PROVIDER NOTES
History     Chief Complaint:    Flank Pain      HPI   Chance Shrestha is a 79 year old male who presents to the ED for evaluation of flank pain. The patient states that he has been having intermittent left sided flank pain for the past three days, consistent with previous kidney stones. He reports that his pain is generally better in the morning and gets progressively worse throughout the day. He also complains of nausea. He otherwise denies any dysuria, frequency, urgency, hematuria, vomiting, or fevers. He has not taken any medications for pain prior to arrival.     Allergies:  Oxycodone  Codeine     Medications:    Norvasc  Lipitor  Lovenox  Zetia  Flonase  Amaryl  Synthroid  Lisinopril  Metformin  Metoprolol  Remeron  Effexor  Coumadin     Past Medical History:    Constipation  Depression  Type II DM  GERD  HLD  HTN  Hypothyroid  Ischemic heart disease  Seizures  Systolic CHF  Kidney stone  SAH  Atrial fibrillation  Cranial nerve VII palsy  Vertigo  PAD  Hemorrhagic stroke  Epilepsy  PVD  Kidney stones  CAD  Renal artery stenosis    Past Surgical History:    Lower extremity angiogram  Hip replacement  Foot surgery  Back surgery  Removal of kidney stone  Artificial heart valve with double bypass  CABG  Hernia repair  ESWL  Cholecystectomy  Neck surgery  PTCA x4  Lumbar laminectomy    Family History:    History reviewed. No pertinent family history.    Social History:  Negative for tobacco use.  Negative for alcohol use.  Marital Status:        Review of Systems   Constitutional: Negative for fever.   Gastrointestinal: Positive for nausea. Negative for vomiting.   Genitourinary: Positive for flank pain. Negative for dysuria, frequency, hematuria and urgency.   All other systems reviewed and are negative.        Physical Exam   First Vitals:  BP: (!) 169/85  Pulse: 68  Heart Rate: 63  Temp: 97.8  F (36.6  C)  Resp: 16  Height: 182.9 cm (6')  Weight: 83.9 kg (185 lb)  SpO2: 95 %      Physical  Exam  Constitutional: The patient is oriented to person, place, and time. Mildly uncomfortable.   HENT:   Head: Atraumatic  Right Ear: Normal  Left Ear: Normal  Nose: Nose normal.   Mouth/Throat: Oropharynx is clear and moist. No erythema or exudate.   Eyes: Conjunctivae and EOM are normal. Pupils are equal, round, and reactive to light. No discharge  Neck: Normal range of motion. Neck supple.   Cardiovascular: Normal rate, regular rhythm, no murmur gallops or rubs. Intact distal pulses.    Pulmonary/Chest: CTA bilaterally. No wheezes rale or rhonchi.  Abdominal: Soft. Non tender.  No masses   Musculoskeletal: No edema. No bony deformity. Normal range of motion. No focal tenderness.   Lymphadenopathy:     The patient has no cervical adenopathy.   Neurological: The patient is alert and oriented to person, place, and time. The patient has normal strength and normal reflexes. No cranial nerve deficit. Coordination normal.   Skin: Skin is warm and dry. No rash noted. The patient is not diaphoretic.   Psychiatric: The patient has a normal mood and affect.    Emergency Department Course   Imaging:  Radiographic findings were communicated with the patient who voiced understanding of the findings.  CT Abdomen/Pelvis w/o IV contrast-stone protocol:   IMPRESSION: 6 mm stone in the proximal left ureter with mild proximal  hydronephrosis and stranding as per radiology.     Laboratory:  UA with Microscopic: G, Blood: moderate, protein albumin: 30, Leukocyte esterase: trace, RBC: 21, Mucous: present, o/w WNL    Creatinine POC: 1.6 (H), GFR estimate: 42 (L)    Interventions:  1411 Tylenol 1,000 mg PO   Zofran 4 mg PO    Emergency Department Course:  Nursing notes and vitals reviewed. (1403) I performed an exam of the patient as documented above.     The patient provided a urine sample here in the emergency department. This was sent for laboratory testing, findings above.     IV inserted. Medicine administered as documented  above. Blood drawn.     The patient was sent for a abdomen/pelvis CT while in the emergency department, findings above.     1506 I rechecked the patient and discussed the results of his workup thus far.     1526 I consulted with Dr. Mcgee, urology, regarding the patient's history and presentation here in the emergency department.    Findings and plan explained to the Patient. Patient discharged home with instructions regarding supportive care, medications, and reasons to return. The importance of close follow-up was reviewed.     I personally reviewed the laboratory results with the Patient and answered all related questions prior to discharge.   Impression & Plan    Medical Decision Making:  Chance Shrestha is a 79 year old male who presents with flank pain. A broad differential diagnosis was considered including diverticulitis, ureterolithiasis, tumor, colitis, cholecystitis, aneurysm, dissection, volvulus, appendicitis, splenic issue, stomach pathology, ulcer, hydronephrosis, pneumonia, rib fracture, UTI, pyelonephritis amongst many other etiologies.   Signs and symptoms consistent with ureterolithiasis.  Patients pain is controlled in ED with oral Tylenol.  No signs of infected stone.  Patient is hemodynamically stable in ED. Plan is home with  follow up with Dr. Mcgee in clinic on Monday. Return for fevers greater than 102, increasing pain, other new symptoms develop.  Ureterolithiasis precautions for home.  Questions were answered.       Diagnosis:    ICD-10-CM    1. Renal colic N23        Disposition:  discharged to home    Scribe Disclosure:  I,  Boogie Ken, am serving as a scribe on 10/5/2019 at 2:03 PM to personally document services performed by Ernie Carrillo MD based on my observations and the provider's statements to me.        Boogie Ken  10/5/2019    EMERGENCY DEPARTMENT       Ernie Carrillo MD  10/05/19 3280

## 2019-10-05 NOTE — ED NOTES
Bed: ED22  Expected date: 10/5/19  Expected time: 1:57 PM  Means of arrival:   Comments:  Triage

## 2019-10-05 NOTE — ED AVS SNAPSHOT
Emergency Department  [address] [place] [address]  Phone:  [phone]  Fax:  [phone]                                    Chance Shrestha   MRN: [id number]    Department:   Emergency Department   Date of Visit:  10/5/2019           After Visit Summary Signature Page    I have received my discharge instructions, and my questions have been answered. I have discussed any challenges I see with this plan with the nurse or doctor.    ..........................................................................................................................................  Patient/Patient Representative Signature      ..........................................................................................................................................  Patient Representative Print Name and Relationship to Patient    ..................................................               ................................................  Date                                   Time    ..........................................................................................................................................  Reviewed by Signature/Title    ...................................................              ..............................................  Date                                               Time          22EPIC Rev 08/18

## 2019-10-07 ENCOUNTER — OFFICE VISIT (OUTPATIENT)
Dept: UROLOGY | Facility: CLINIC | Age: 79
End: 2019-10-07
Payer: MEDICARE

## 2019-10-07 ENCOUNTER — HOSPITAL ENCOUNTER (OUTPATIENT)
Dept: WOUND CARE | Facility: CLINIC | Age: 79
Discharge: HOME | End: 2019-10-07
Attending: SURGERY | Admitting: SURGERY
Payer: MEDICARE

## 2019-10-07 VITALS
HEART RATE: 65 BPM | BODY MASS INDEX: 24.52 KG/M2 | HEIGHT: 73 IN | OXYGEN SATURATION: 94 % | WEIGHT: 185 LBS | SYSTOLIC BLOOD PRESSURE: 124 MMHG | DIASTOLIC BLOOD PRESSURE: 66 MMHG

## 2019-10-07 VITALS
DIASTOLIC BLOOD PRESSURE: 79 MMHG | RESPIRATION RATE: 18 BRPM | HEART RATE: 65 BPM | TEMPERATURE: 96.5 F | SYSTOLIC BLOOD PRESSURE: 154 MMHG

## 2019-10-07 DIAGNOSIS — L97.511 SKIN ULCER OF TOE OF RIGHT FOOT, LIMITED TO BREAKDOWN OF SKIN (H): ICD-10-CM

## 2019-10-07 DIAGNOSIS — L97.522 SKIN ULCER OF TOE OF LEFT FOOT WITH FAT LAYER EXPOSED (H): ICD-10-CM

## 2019-10-07 DIAGNOSIS — I73.9 PAD (PERIPHERAL ARTERY DISEASE) (H): ICD-10-CM

## 2019-10-07 DIAGNOSIS — L89.893 PRESSURE ULCER OF DORSUM OF RIGHT FOOT, STAGE 3 (H): ICD-10-CM

## 2019-10-07 DIAGNOSIS — N20.0 KIDNEY STONE: Primary | ICD-10-CM

## 2019-10-07 LAB
ALBUMIN UR-MCNC: ABNORMAL MG/DL
APPEARANCE UR: CLEAR
BILIRUB UR QL STRIP: NEGATIVE
COLOR UR AUTO: YELLOW
GLUCOSE UR STRIP-MCNC: 100 MG/DL
HGB UR QL STRIP: NEGATIVE
KETONES UR STRIP-MCNC: NEGATIVE MG/DL
LEUKOCYTE ESTERASE UR QL STRIP: NEGATIVE
NITRATE UR QL: NEGATIVE
PH UR STRIP: 5 PH (ref 5–7)
SOURCE: ABNORMAL
SP GR UR STRIP: 1.02 (ref 1–1.03)
UROBILINOGEN UR STRIP-ACNC: 0.2 EU/DL (ref 0.2–1)

## 2019-10-07 PROCEDURE — 11042 DBRDMT SUBQ TIS 1ST 20SQCM/<: CPT

## 2019-10-07 PROCEDURE — 11042 DBRDMT SUBQ TIS 1ST 20SQCM/<: CPT | Performed by: SURGERY

## 2019-10-07 PROCEDURE — 81003 URINALYSIS AUTO W/O SCOPE: CPT | Performed by: UROLOGY

## 2019-10-07 PROCEDURE — 99203 OFFICE O/P NEW LOW 30 MIN: CPT | Performed by: UROLOGY

## 2019-10-07 PROCEDURE — A6261 WOUND FILLER GEL/PASTE /OZ: HCPCS

## 2019-10-07 ASSESSMENT — PAIN SCALES - GENERAL: PAINLEVEL: NO PAIN (0)

## 2019-10-07 ASSESSMENT — MIFFLIN-ST. JEOR: SCORE: 1598.03

## 2019-10-07 NOTE — PROGRESS NOTES
History: It is a pleasure to see this very pleasant 79-year-old gentleman in initial consultation today at the request of the emergency room physicians.  He had presented to our emergency room over this last weekend with severe left-sided flank pain for the previous 3 days.  He had a past history of urinary stone disease having had ESWL in the past.  CT scan had been done in the emergency room.  CT ABDOMEN AND PELVIS WITHOUT CONTRAST 10/5/2019 2:36 PM      HISTORY: Flank pain, stone disease suspected - left.     COMPARISON: None.     TECHNIQUE: Axial CT images of the abdomen and pelvis from the  diaphragm to the symphysis pubis were acquired without IV contrast.  Radiation dose for this scan was reduced using automated exposure  control, adjustment of the mA and/or kV according to patient size, or  iterative reconstruction technique.     FINDINGS: 6 cm stone in the proximal left ureter with mild proximal  hydronephrosis. There is mild left perinephric fat stranding. Kidneys  are normal in size and configuration. No other renal, ureteral, or  bladder stones. No free air or free fluid. There are no dilated loops  of small intestine or large bowel to suggest ileus or obstruction.  Normal appendix. Cholecystectomy. Unremarkable liver. No splenomegaly.  Minimal adrenal thickening. There are extensive atherosclerotic  changes of the visualized aorta and its branches. There is no evidence  of aortic aneurysm. Pancreas grossly unremarkable. The remainder of  the visualized abdomen is unremarkable on this noncontrast scan.  Survey of the visualized bony structures demonstrates no destructive  bony lesions. The visualized lung bases are unremarkable.                                                                       IMPRESSION: 6 mm stone in the proximal left ureter with mild proximal  hydronephrosis and stranding.     LINDA WILLIAMSON MD    This identified about a 6 mm stone in the mid section of the left ureter with mild  hydronephrosis only.    Today however his symptoms have ceased and he feels very comfortable.  He has not observed blood in the urine.  Microscopic hematuria was observed however when he was in the emergency room.  Urinalysis today however is quite negative.    He has significant medical issues as noted below this includes a history of subarachnoid hemorrhage with a left hemiparesis which is still present and he is taking anticoagulants at this time, as well as a history of seizures and of tobacco use    Past Medical History:   Diagnosis Date     Constipation      Depression      DM type 2 (diabetes mellitus, type 2) (H)      GERD (gastroesophageal reflux disease)      Hernia, abdominal      HLD (hyperlipidemia)      HTN (hypertension)      Hypothyroid      Ischemic heart disease      Mumps     as child      Seizures (H)      Systolic CHF (H)        Past Surgical History:   Procedure Laterality Date     HERNIA REPAIR       IR LOWER EXTREMITY ANGIOGRAM BILATERAL  7/17/2019     ORTHOPEDIC SURGERY Right     Hip replacement       History reviewed. No pertinent family history.    Social History     Socioeconomic History     Marital status:      Spouse name: Not on file     Number of children: Not on file     Years of education: Not on file     Highest education level: Not on file   Occupational History     Not on file   Social Needs     Financial resource strain: Not on file     Food insecurity:     Worry: Not on file     Inability: Not on file     Transportation needs:     Medical: Not on file     Non-medical: Not on file   Tobacco Use     Smoking status: Never Smoker     Smokeless tobacco: Never Used   Substance and Sexual Activity     Alcohol use: No     Drug use: No     Sexual activity: Not on file   Lifestyle     Physical activity:     Days per week: Not on file     Minutes per session: Not on file     Stress: Not on file   Relationships     Social connections:     Talks on phone: Not on file     Gets  together: Not on file     Attends Gnosticism service: Not on file     Active member of club or organization: Not on file     Attends meetings of clubs or organizations: Not on file     Relationship status: Not on file     Intimate partner violence:     Fear of current or ex partner: Not on file     Emotionally abused: Not on file     Physically abused: Not on file     Forced sexual activity: Not on file   Other Topics Concern     Parent/sibling w/ CABG, MI or angioplasty before 65F 55M? Not Asked   Social History Narrative     Not on file       Current Outpatient Medications   Medication Sig Dispense Refill     acetaminophen (PHARBETOL) 325 MG tablet Take 650 mg by mouth       amLODIPine (NORVASC) 10 MG tablet Take 10 mg by mouth daily        atorvastatin (LIPITOR) 80 MG tablet Take 80 mg by mouth daily        fluticasone (FLONASE) 50 MCG/ACT nasal spray Spray 1 spray into both nostrils daily       glimepiride (AMARYL) 1 MG tablet Take 1 tablet (1 mg) by mouth every morning (before breakfast) 90 tablet 1     levothyroxine (SYNTHROID/LEVOTHROID) 25 MCG tablet Take 25 mcg by mouth daily        lisinopril (PRINIVIL/ZESTRIL) 20 MG tablet Take 20 mg by mouth daily        metFORMIN (GLUCOPHAGE) 1000 MG tablet Take 1,000 mg by mouth 2 times daily (with meals)        order for [facility]  [place]:Phone: [phone] Fax: [phone]  [place]: Phone [phone] Fax [phone]  Please call Patient to schedule  Evaluate and Treat as indicated   Left Foot Modification to AFO or new AFO, non healing ulcer of left great toe and rubbing on left ankle 1 Device 0     enoxaparin (LOVENOX) 80 MG/0.8ML syringe INJECT 0.8 MLS (80 MG) SUBCUTANEOUS 2 TIMES DAILY (Patient not taking: Reported on 10/7/2019) 12 Syringe 0     ezetimibe (ZETIA) 10 MG tablet Take 1 tablet (10 mg) by mouth daily 90 tablet 3     metoprolol tartrate (LOPRESSOR) 100 MG tablet Take 100 mg by mouth 2 times daily        mirtazapine  "(REMERON) 15 MG tablet Take 15 mg by mouth At Bedtime       Multiple Vitamin (DAILY VITES) TABS Take 1 tablet by mouth daily        ondansetron (ZOFRAN-ODT) 4 MG ODT tab Take 1 tablet (4 mg) by mouth every 6 hours as needed for nausea 10 tablet 0     polyethylene glycol (MIRALAX/GLYCOLAX) powder Take 17 g by mouth daily as needed        ranitidine (ZANTAC) 75 MG tablet Take 75 mg by mouth 2 times daily       traMADol (ULTRAM) 50 MG tablet Take 1 tablet (50 mg) by mouth every 6 hours as needed for severe pain 10 tablet 0     venlafaxine (EFFEXOR-XR) 37.5 MG 24 hr capsule Take 37.5 mg by mouth daily With venlafaxine ER 75 mg       venlafaxine (EFFEXOR-XR) 75 MG 24 hr capsule Take 75 mg by mouth daily With venlafaxine er 37.5 mg       vitamin D3 (VITAMIN D3) 1000 units (25 mcg) tablet Take 5,000 Units by mouth        warfarin (COUMADIN) 7.5 MG tablet 7.5 mg daily          Review Of Systems:  Skin: negative  Eyes: negative  Ears/Nose/Throat: negative  Respiratory: History of tobacco use  Cardiovascular: Peripheral vascular disease, atrial fibrillation, previous history of aortic regurgitation with mechanical valve now in place, history of cerebrovascular accident  Gastrointestinal: History of peptic ulceration  Genitourinary: kidney stone  Musculoskeletal: negative  Neurologic: seizures and hemiparesis  Psychiatric: negative  Hematologic/Lymphatic/Immunologic: negative  Endocrine: diabetes    Exam:  /66 (BP Location: Left arm, Patient Position: Sitting, Cuff Size: Adult Regular)   Pulse 65   Ht 1.854 m (6' 1\")   Wt 83.9 kg (185 lb)   SpO2 94%   BMI 24.41 kg/m      General Impression: Pleasant gentleman who is conversational, seems well-oriented.    Mental status satisfactory    HEENT: There is no clinical evidence of jaundice on examination of mucous membranes.  Mucous membranes are normal.  Extraocular eye movements are mildly abnormal    Skin: Skin is otherwise normal to examination    Lymph Nodes: No " "cervical lymphadenopathy    Respiratory System: Respiratory cycle normal    Cardiovascular: No significant peripheral edema    Abdominal: Not examined    Extremities: Extremities otherwise unremarkable except for weakness in the left lower extremity    Back and Flank: There is no tenderness over either renal angle    Genital: Not examined    Rectal: Not examined    Neurologic: The patient has mild left-sided facial weakness and left hemiparesis affecting the left arm and left leg.    Impression: The patient has no symptoms at this point.  I have reviewed the CT scan carefully done in the emergency room which shows a 6 mm stone in mid section of the left ureter with minimal hydronephrosis.  No other stones are seen.  Review of a prior CT scan note done over a year ago showed that the same stone was in the lower calyx of the left kidney.  I reviewed his records very carefully given this very significant medical history and the fact that he is on anticoagulants.  I am going to arrange for a KUB to see if the stone is still present.  We will strain his urine.  If the stone is passed we will try to retrieve it and send it for analysis.  If the stone is still present we will may need to consider intervention although his medical condition is such that this will be done with considerable care.  I would likely consider ureteroscopy given that the stone is moving down the ureter if we do need to intervene.  I did talk about these issues carefully with the patient in detail today.  I answered many questions.    Plan: KUB and see me after    \"This dictation was performed with voice recognition software and may contain errors,  omissions and inadvertent word substitution.\"    "

## 2019-10-07 NOTE — DISCHARGE INSTRUCTIONS
[facility]  [address] [facility] [address], [place] [address]  Appointment Phone [phone] Nurse Advisors [phone]    Chance COYLE [place] 1940    [facility] Phone [phone] Fax [phone]    Wound Dressing Change to all ulcers on left plantar toe  Cleanse wound and surrounding skin with wound cleanser  Cover wound with Iodosorb  Cover wound with bandaids  Change dressing Monday, Wednesday and Friday    A diet high in protein is important for wound healing, we recommend getting 100 grams of protein per day. Taking protein shakes or bars are a good way to get extra protein in your diet.     Good sources of protein:  Pork 26g per 3 oz  Whey protein powder - 24g per scoop (on average)  Greek yogurt - 23g per 8oz   Chicken or Turkey - 23g per 3oz  Fish - 20-25g per 3oz  Beef - 18-23g per 3oz  Navy beans - 20g per cup  Cottage cheese - 14g per 1/2 cup   Lentils - 13g per 1/4 cup  Beef jerky 13g per 1oz  2% milk - 8g per cup  Peanut butter - 8g per 2 tablespoons  Eggs - 6g per egg  Mixed nuts - 6g per 2oz    Alban Jones M.D. October 7, 2019    Call us at [phone] if you have any questions about your wounds, have redness or  swelling around your wound, have a fever of 101 or greater or if you have any other  problems or concerns. We answer the phone Monday through Friday 8 am to 4 pm,  please leave a message as we check the voicemail frequently throughout the day.    Follow up with Dr. Jones 3-4 weeks

## 2019-10-07 NOTE — LETTER
10/7/2019       RE: Chance Shrestha  [address]  [place] [address]     Dear Colleague,    Thank you for referring your patient, Chance Shrestha, to the [facility] at [facility]. Please see a copy of my visit note below.    History: It is a pleasure to see this very pleasant 79-year-old gentleman in initial consultation today at the request of the emergency room physicians.  He had presented to our emergency room over this last weekend with severe left-sided flank pain for the previous 3 days.  He had a past history of urinary stone disease having had ESWL in the past.  CT scan had been done in the emergency room.  CT ABDOMEN AND PELVIS WITHOUT CONTRAST 10/5/2019 2:36 PM      HISTORY: Flank pain, stone disease suspected - left.     COMPARISON: None.     TECHNIQUE: Axial CT images of the abdomen and pelvis from the  diaphragm to the symphysis pubis were acquired without IV contrast.  Radiation dose for this scan was reduced using automated exposure  control, adjustment of the mA and/or kV according to patient size, or  iterative reconstruction technique.     FINDINGS: 6 cm stone in the proximal left ureter with mild proximal  hydronephrosis. There is mild left perinephric fat stranding. Kidneys  are normal in size and configuration. No other renal, ureteral, or  bladder stones. No free air or free fluid. There are no dilated loops  of small intestine or large bowel to suggest ileus or obstruction.  Normal appendix. Cholecystectomy. Unremarkable liver. No splenomegaly.  Minimal adrenal thickening. There are extensive atherosclerotic  changes of the visualized aorta and its branches. There is no evidence  of aortic aneurysm. Pancreas grossly unremarkable. The remainder of  the visualized abdomen is unremarkable on this noncontrast scan.  Survey of the visualized bony structures demonstrates no destructive  bony lesions.  The visualized lung bases are unremarkable.                                                                       IMPRESSION: 6 mm stone in the proximal left ureter with mild proximal  hydronephrosis and stranding.     LINDA WILLIAMSON MD    This identified about a 6 mm stone in the mid section of the left ureter with mild hydronephrosis only.    Today however his symptoms have ceased and he feels very comfortable.  He has not observed blood in the urine.  Microscopic hematuria was observed however when he was in the emergency room.  Urinalysis today however is quite negative.    He has significant medical issues as noted below this includes a history of subarachnoid hemorrhage with a left hemiparesis which is still present and he is taking anticoagulants at this time, as well as a history of seizures and of tobacco use    Past Medical History:   Diagnosis Date     Constipation      Depression      DM type 2 (diabetes mellitus, type 2) (H)      GERD (gastroesophageal reflux disease)      Hernia, abdominal      HLD (hyperlipidemia)      HTN (hypertension)      Hypothyroid      Ischemic heart disease      Mumps     as child      Seizures (H)      Systolic CHF (H)        Past Surgical History:   Procedure Laterality Date     HERNIA REPAIR       IR LOWER EXTREMITY ANGIOGRAM BILATERAL  7/17/2019     ORTHOPEDIC SURGERY Right     Hip replacement       History reviewed. No pertinent family history.    Social History     Socioeconomic History     Marital status:      Spouse name: Not on file     Number of children: Not on file     Years of education: Not on file     Highest education level: Not on file   Occupational History     Not on file   Social Needs     Financial resource strain: Not on file     Food insecurity:     Worry: Not on file     Inability: Not on file     Transportation needs:     Medical: Not on file     Non-medical: Not on file   Tobacco Use     Smoking status: Never Smoker     Smokeless tobacco: Never  Used   Substance and Sexual Activity     Alcohol use: No     Drug use: No     Sexual activity: Not on file   Lifestyle     Physical activity:     Days per week: Not on file     Minutes per session: Not on file     Stress: Not on file   Relationships     Social connections:     Talks on phone: Not on file     Gets together: Not on file     Attends Spiritism service: Not on file     Active member of club or organization: Not on file     Attends meetings of clubs or organizations: Not on file     Relationship status: Not on file     Intimate partner violence:     Fear of current or ex partner: Not on file     Emotionally abused: Not on file     Physically abused: Not on file     Forced sexual activity: Not on file   Other Topics Concern     Parent/sibling w/ CABG, MI or angioplasty before 65F 55M? Not Asked   Social History Narrative     Not on file       Current Outpatient Medications   Medication Sig Dispense Refill     acetaminophen (PHARBETOL) 325 MG tablet Take 650 mg by mouth       amLODIPine (NORVASC) 10 MG tablet Take 10 mg by mouth daily        atorvastatin (LIPITOR) 80 MG tablet Take 80 mg by mouth daily        fluticasone (FLONASE) 50 MCG/ACT nasal spray Spray 1 spray into both nostrils daily       glimepiride (AMARYL) 1 MG tablet Take 1 tablet (1 mg) by mouth every morning (before breakfast) 90 tablet 1     levothyroxine (SYNTHROID/LEVOTHROID) 25 MCG tablet Take 25 mcg by mouth daily        lisinopril (PRINIVIL/ZESTRIL) 20 MG tablet Take 20 mg by mouth daily        metFORMIN (GLUCOPHAGE) 1000 MG tablet Take 1,000 mg by mouth 2 times daily (with meals)        order for [facility]  [place]:Phone: [phone] Fax: [phone]  [place]: Phone [phone] Fax [phone]  Please call Patient to schedule  Evaluate and Treat as indicated   Left Foot Modification to AFO or new AFO, non healing ulcer of left great toe and rubbing on left ankle 1 Device 0     enoxaparin (LOVENOX) 80  "MG/0.8ML syringe INJECT 0.8 MLS (80 MG) SUBCUTANEOUS 2 TIMES DAILY (Patient not taking: Reported on 10/7/2019) 12 Syringe 0     ezetimibe (ZETIA) 10 MG tablet Take 1 tablet (10 mg) by mouth daily 90 tablet 3     metoprolol tartrate (LOPRESSOR) 100 MG tablet Take 100 mg by mouth 2 times daily        mirtazapine (REMERON) 15 MG tablet Take 15 mg by mouth At Bedtime       Multiple Vitamin (DAILY VITES) TABS Take 1 tablet by mouth daily        ondansetron (ZOFRAN-ODT) 4 MG ODT tab Take 1 tablet (4 mg) by mouth every 6 hours as needed for nausea 10 tablet 0     polyethylene glycol (MIRALAX/GLYCOLAX) powder Take 17 g by mouth daily as needed        ranitidine (ZANTAC) 75 MG tablet Take 75 mg by mouth 2 times daily       traMADol (ULTRAM) 50 MG tablet Take 1 tablet (50 mg) by mouth every 6 hours as needed for severe pain 10 tablet 0     venlafaxine (EFFEXOR-XR) 37.5 MG 24 hr capsule Take 37.5 mg by mouth daily With venlafaxine ER 75 mg       venlafaxine (EFFEXOR-XR) 75 MG 24 hr capsule Take 75 mg by mouth daily With venlafaxine er 37.5 mg       vitamin D3 (VITAMIN D3) 1000 units (25 mcg) tablet Take 5,000 Units by mouth        warfarin (COUMADIN) 7.5 MG tablet 7.5 mg daily          Review Of Systems:  Skin: negative  Eyes: negative  Ears/Nose/Throat: negative  Respiratory: History of tobacco use  Cardiovascular: Peripheral vascular disease, atrial fibrillation, previous history of aortic regurgitation with mechanical valve now in place, history of cerebrovascular accident  Gastrointestinal: History of peptic ulceration  Genitourinary: kidney stone  Musculoskeletal: negative  Neurologic: seizures and hemiparesis  Psychiatric: negative  Hematologic/Lymphatic/Immunologic: negative  Endocrine: diabetes    Exam:  /66 (BP Location: Left arm, Patient Position: Sitting, Cuff Size: Adult Regular)   Pulse 65   Ht 1.854 m (6' 1\")   Wt 83.9 kg (185 lb)   SpO2 94%   BMI 24.41 kg/m       General Impression: Pleasant gentleman " "who is conversational, seems well-oriented.    Mental status satisfactory    HEENT: There is no clinical evidence of jaundice on examination of mucous membranes.  Mucous membranes are normal.  Extraocular eye movements are mildly abnormal    Skin: Skin is otherwise normal to examination    Lymph Nodes: No cervical lymphadenopathy    Respiratory System: Respiratory cycle normal    Cardiovascular: No significant peripheral edema    Abdominal: Not examined    Extremities: Extremities otherwise unremarkable except for weakness in the left lower extremity    Back and Flank: There is no tenderness over either renal angle    Genital: Not examined    Rectal: Not examined    Neurologic: The patient has mild left-sided facial weakness and left hemiparesis affecting the left arm and left leg.    Impression: The patient has no symptoms at this point.  I have reviewed the CT scan carefully done in the emergency room which shows a 6 mm stone in mid section of the left ureter with minimal hydronephrosis.  No other stones are seen.  Review of a prior CT scan note done over a year ago showed that the same stone was in the lower calyx of the left kidney.  I reviewed his records very carefully given this very significant medical history and the fact that he is on anticoagulants.  I am going to arrange for a KUB to see if the stone is still present.  We will strain his urine.  If the stone is passed we will try to retrieve it and send it for analysis.  If the stone is still present we will may need to consider intervention although his medical condition is such that this will be done with considerable care.  I would likely consider ureteroscopy given that the stone is moving down the ureter if we do need to intervene.  I did talk about these issues carefully with the patient in detail today.  I answered many questions.    Plan: KUB and see me after    \"This dictation was performed with voice recognition software and may contain errors, " " omissions and inadvertent word substitution.\"      Again, thank you for allowing me to participate in the care of your patient.      Sincerely,    Donnie Morales MD      "

## 2019-10-07 NOTE — NURSING NOTE
Chief Complaint   Patient presents with     Kidney Stone Related     patient is here for kidney stones.        Patient does not remember all his medications. I was unable to review all of them.     Steffi Solano, CMA

## 2019-10-07 NOTE — PROGRESS NOTES
[facility] [place] Progress Note    Subject: Chance Shrestha returns for his bilateral toe ulcerations.  Significant PAD.  He is undergone angiography on 717 2 bilateral trifurcation disease.  On the right the peroneal artery was the runoff artery with collateralization to the posterior tibial level of the ankle.  On his left side again the peroneal artery is the only runoff the collateralized to the distal anterior tibial/dorsalis pedis artery at the level of the ankle.  We are hoping that this would be enough blood supply for healing to occur and thus treated him conservatively using silver cell on the ulcerated area.    Visit his appetite is been good but he is not taking any nutritional supplements.  He does have appropriate tennis shoes that have no pressure over his toes.  He is ambulatory but does use a wheelchair if he is going longer distances.    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)     PVD (peripheral vascular disease) (H)     SAH (subarachnoid hemorrhage) (H)     Skin ulcer of toe of right foot, limited to breakdown of skin (H)     Status post amputation of right great toe (H)     Tobacco use     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)     Uncomplicated alcohol abuse     Vertigo as late effect of stroke     Warfarin anticoagulation     Skin ulcer of toe of left  foot with fat layer exposed (H)     PAD (peripheral artery disease) (H)     Pressure ulcer of dorsum of right foot, stage 3 (H)     Past Medical History:   Diagnosis Date     Constipation      Depression      DM type 2 (diabetes mellitus, type 2) (H)      GERD (gastroesophageal reflux disease)      HLD (hyperlipidemia)      HTN (hypertension)      Hypothyroid      Ischemic heart disease      Seizures (H)      Systolic CHF (H)      Exam:  BP (!) 154/79   Pulse 65   Temp 96.5  F (35.8  C) (Temporal)   Resp 18   Wound (used by OP [facility] only) 06/12/19 0935 Left plantar 1 toe ulceration, arterial (Active)   Length (cm) 0.4 10/7/2019 10:00 AM   Width (cm) 0.3 10/7/2019 10:00 AM   Depth (cm) 0.4 10/7/2019 10:00 AM   Wound (cm^2) 0.12 cm^2 10/7/2019 10:00 AM   Wound Volume (cm^3) 0.05 cm^3 10/7/2019 10:00 AM   Wound healing % 89.74 10/7/2019 10:00 AM   Dressing Appearance moist drainage 10/7/2019 10:00 AM   Drainage Characteristics/Odor serosanguineous 10/7/2019 10:00 AM   Drainage Amount moderate 10/7/2019 10:00 AM     Alert and appropriate.  Here with a friend.  Chest= clear  Cardiovascular= regular rate  We see a scab over the right dorsal third and tip of the partially amputated second toe.  No erythema.  No edema.  No pain.    On the left medial plantar great toe the ulceration measures 0.4 x 0.3 x 0.4 cm.  Mild amount of callus surrounding the edges.  No erythema.  No edema.  The rest of the ulcers on the left toes and foot have healed.  The left dorsal foot ulcer has healed.    Procedure:   Patient was determined to be capable of making their own medical decisions and informed consent was obtained. Topical anesthetic of 4% lidocaine was applied, debridement was performed using a #15 blade down to and including subcutaneous tissue.  I excised the edges and base of the great toe ulcer on the left down to bleeding tissue.  Surrounding debrided tissue is very healthy with good blood supply..  Remove the scabs on the  right second and third toes with no underlying ulcers.  Bleeding controlled with light pressure. Patient tolerated procedure well.    Impression: 1.  Slowly improving ulcerations left great toe.  Since he is are improving I do not feel that the distal bypass to be talked about going to the dorsalis pedis artery is indicated discussed with the patient and his friend.  With the decreased depth will switch to Iodosorb to be changed 3 times weekly without overlying protective Band-Aid.  Also discussed importance of nutritional supplements and I recommend they take at least 30 g additional protein daily to aid in healing.                   #2.  Healing of right toe ulcers.  No dressings are required.  However, continue with good foot wear that he is using.    Plan: We will dress the wounds with the sorb to left great toe ulcer.  Patient will return to the clinic in 3 weeks time    Alban Jones MD on 10/7/2019 at 10:08 AM

## 2019-10-10 ENCOUNTER — OFFICE VISIT (OUTPATIENT)
Dept: UROLOGY | Facility: CLINIC | Age: 79
End: 2019-10-10
Payer: MEDICARE

## 2019-10-10 ENCOUNTER — HOSPITAL ENCOUNTER (OUTPATIENT)
Dept: GENERAL RADIOLOGY | Facility: CLINIC | Age: 79
Discharge: HOME | End: 2019-10-10
Attending: UROLOGY | Admitting: UROLOGY
Payer: MEDICARE

## 2019-10-10 ENCOUNTER — RECORDS - HEALTHEAST (OUTPATIENT)
Dept: LAB | Facility: CLINIC | Age: 79
End: 2019-10-10

## 2019-10-10 VITALS
OXYGEN SATURATION: 93 % | HEIGHT: 73 IN | DIASTOLIC BLOOD PRESSURE: 76 MMHG | WEIGHT: 185 LBS | BODY MASS INDEX: 24.52 KG/M2 | HEART RATE: 67 BPM | SYSTOLIC BLOOD PRESSURE: 108 MMHG

## 2019-10-10 DIAGNOSIS — N20.0 KIDNEY STONE: ICD-10-CM

## 2019-10-10 DIAGNOSIS — N20.0 KIDNEY STONE: Primary | ICD-10-CM

## 2019-10-10 LAB — INR PPP: 3.62 (ref 0.9–1.1)

## 2019-10-10 PROCEDURE — 99214 OFFICE O/P EST MOD 30 MIN: CPT | Performed by: UROLOGY

## 2019-10-10 PROCEDURE — 74019 RADEX ABDOMEN 2 VIEWS: CPT

## 2019-10-10 ASSESSMENT — PAIN SCALES - GENERAL: PAINLEVEL: NO PAIN (0)

## 2019-10-10 ASSESSMENT — MIFFLIN-ST. JEOR: SCORE: 1598.03

## 2019-10-10 NOTE — LETTER
10/10/2019       RE: Chance Shrestha  [address]  [place] [address]     Dear Colleague,    Thank you for referring your patient, Chance Shrestha, to the [facility] at [facility]. Please see a copy of my visit note below.    It is a pleasure to see this very pleasant 79-year-old gentleman in follow-up consultation today.  He had presented to our emergency room over this last weekend with severe left-sided flank pain for the previous 3 days.  He had a past history of urinary stone disease having had ESWL in the past.  CT scan had been done in the emergency room.  CT ABDOMEN AND PELVIS WITHOUT CONTRAST 10/5/2019 2:36 PM      HISTORY: Flank pain, stone disease suspected - left.     COMPARISON: None.     TECHNIQUE: Axial CT images of the abdomen and pelvis from the  diaphragm to the symphysis pubis were acquired without IV contrast.  Radiation dose for this scan was reduced using automated exposure  control, adjustment of the mA and/or kV according to patient size, or  iterative reconstruction technique.     FINDINGS: 6 cm stone in the proximal left ureter with mild proximal  hydronephrosis. There is mild left perinephric fat stranding. Kidneys  are normal in size and configuration. No other renal, ureteral, or  bladder stones. No free air or free fluid. There are no dilated loops  of small intestine or large bowel to suggest ileus or obstruction.  Normal appendix. Cholecystectomy. Unremarkable liver. No splenomegaly.  Minimal adrenal thickening. There are extensive atherosclerotic  changes of the visualized aorta and its branches. There is no evidence  of aortic aneurysm. Pancreas grossly unremarkable. The remainder of  the visualized abdomen is unremarkable on this noncontrast scan.  Survey of the visualized bony structures demonstrates no destructive  bony lesions. The visualized lung bases are unremarkable.          IMPRESSION: 6 mm stone in the proximal left ureter with mild proximal  hydronephrosis and stranding.     LINDA WILLIAMSON MD     This identified about a 6 mm stone in the mid section of the left ureter with mild hydronephrosis only.       We decided at that time, and that he had no further symptoms since being in the emergency room that we would get a KUB to see if this the stone was easily seen.    A KUB has been performed today.  ABDOMEN ONE VIEW  10/10/2019 10:13 AM     HISTORY: Kidney stone.     COMPARISON: None.                                                                      IMPRESSION: Unremarkable bowel gas pattern. No definite renal calculi  over the kidneys or over the expected course of the ureter. Stone  described on CT recently is not demonstrated by plain radiography.    I have carefully looked at the KUB today and do not see the stone.  However given careful review of the previous CT scan, if the stone was in the same position it would be right over the sacroiliac joint and therefore possibly not visible on the KUB.  It is possible the stone is possible the patient does not been able to retrieve it.  He has had no symptoms since being in the emergency room.  Patient does have some significant medical issues with vascular disease and has aortic valve that has been replaced and is on medication for type 2 diabetes as well as anticoagulants.    I reviewed the films very carefully today and had a lengthy discussion with the patient and his wife.  The stone may still be present, although we cannot see it on the KUB today.  It would be perfectly reasonable to be conservative at the present time as the CT scan in addition showed minimal evidence of hydronephrosis.  I would then recommend repeating a CT scan without contrast in 2 weeks.  The alternative would be to proceed now with plans for ureteroscopy and possible use of a holmium laser if the stone is still present.  I did explain to him that  "ESWL for stone in this location would not be indicated given his location.  Of course we would have to stop his anticoagulants for 5 days, before embarking on a presurgical procedure.    After careful discussion of all of these options including discussing the potential side effects and complications associated with each, we have decided we will wait 2 weeks and repeat the CT scan without contrast to see if the stone is still present, is still in the same location or if it is moved.  The patient is wife are comfortable with this and I have explained to them that if they do experience severe symptoms again they should come straight to the emergency room and they will then proceed with appropriate treatment promptly.  I did discuss the entire situation with the patient his wife in detail today.  I reviewed all potential pertinent previous and current records, previous and current labs and radiologic studies and we had a careful and extended discussion about all issues related as noted above.  I also answered all the questions.    Plan.  2 weeks for CT abdomen pelvis without contrast for further evaluation.    Time.  25 minutes.  Greater than 50% was spent in discussion consultation extended review of all records labs radiologic studies and discussion about all aspects of potential options of treatment as noted above    \"This dictation was performed with voice recognition software and may contain errors,  omissions and inadvertent word substitution.\"      Again, thank you for allowing me to participate in the care of your patient.      Sincerely,    Donnie Morales MD      "

## 2019-10-10 NOTE — PROGRESS NOTES
It is a pleasure to see this very pleasant 79-year-old gentleman in follow-up consultation today.  He had presented to our emergency room over this last weekend with severe left-sided flank pain for the previous 3 days.  He had a past history of urinary stone disease having had ESWL in the past.  CT scan had been done in the emergency room.  CT ABDOMEN AND PELVIS WITHOUT CONTRAST 10/5/2019 2:36 PM      HISTORY: Flank pain, stone disease suspected - left.     COMPARISON: None.     TECHNIQUE: Axial CT images of the abdomen and pelvis from the  diaphragm to the symphysis pubis were acquired without IV contrast.  Radiation dose for this scan was reduced using automated exposure  control, adjustment of the mA and/or kV according to patient size, or  iterative reconstruction technique.     FINDINGS: 6 cm stone in the proximal left ureter with mild proximal  hydronephrosis. There is mild left perinephric fat stranding. Kidneys  are normal in size and configuration. No other renal, ureteral, or  bladder stones. No free air or free fluid. There are no dilated loops  of small intestine or large bowel to suggest ileus or obstruction.  Normal appendix. Cholecystectomy. Unremarkable liver. No splenomegaly.  Minimal adrenal thickening. There are extensive atherosclerotic  changes of the visualized aorta and its branches. There is no evidence  of aortic aneurysm. Pancreas grossly unremarkable. The remainder of  the visualized abdomen is unremarkable on this noncontrast scan.  Survey of the visualized bony structures demonstrates no destructive  bony lesions. The visualized lung bases are unremarkable.         IMPRESSION: 6 mm stone in the proximal left ureter with mild proximal  hydronephrosis and stranding.     LINDA WILLIAMSON MD     This identified about a 6 mm stone in the mid section of the left ureter with mild hydronephrosis only.       We decided at that time, and that he had no further symptoms since being in the emergency  room that we would get a KUB to see if this the stone was easily seen.    A KUB has been performed today.  ABDOMEN ONE VIEW  10/10/2019 10:13 AM     HISTORY: Kidney stone.     COMPARISON: None.                                                                      IMPRESSION: Unremarkable bowel gas pattern. No definite renal calculi  over the kidneys or over the expected course of the ureter. Stone  described on CT recently is not demonstrated by plain radiography.    I have carefully looked at the KUB today and do not see the stone.  However given careful review of the previous CT scan, if the stone was in the same position it would be right over the sacroiliac joint and therefore possibly not visible on the KUB.  It is possible the stone is possible the patient does not been able to retrieve it.  He has had no symptoms since being in the emergency room.  Patient does have some significant medical issues with vascular disease and has aortic valve that has been replaced and is on medication for type 2 diabetes as well as anticoagulants.    I reviewed the films very carefully today and had a lengthy discussion with the patient and his wife.  The stone may still be present, although we cannot see it on the KUB today.  It would be perfectly reasonable to be conservative at the present time as the CT scan in addition showed minimal evidence of hydronephrosis.  I would then recommend repeating a CT scan without contrast in 2 weeks.  The alternative would be to proceed now with plans for ureteroscopy and possible use of a holmium laser if the stone is still present.  I did explain to him that ESWL for stone in this location would not be indicated given his location.  Of course we would have to stop his anticoagulants for 5 days, before embarking on a presurgical procedure.    After careful discussion of all of these options including discussing the potential side effects and complications associated with each, we have  "decided we will wait 2 weeks and repeat the CT scan without contrast to see if the stone is still present, is still in the same location or if it is moved.  The patient is wife are comfortable with this and I have explained to them that if they do experience severe symptoms again they should come straight to the emergency room and they will then proceed with appropriate treatment promptly.  I did discuss the entire situation with the patient his wife in detail today.  I reviewed all potential pertinent previous and current records, previous and current labs and radiologic studies and we had a careful and extended discussion about all issues related as noted above.  I also answered all the questions.    Plan.  2 weeks for CT abdomen pelvis without contrast for further evaluation.    Time.  25 minutes.  Greater than 50% was spent in discussion consultation extended review of all records labs radiologic studies and discussion about all aspects of potential options of treatment as noted above    \"This dictation was performed with voice recognition software and may contain errors,  omissions and inadvertent word substitution.\"    "

## 2019-10-10 NOTE — NURSING NOTE
Chief Complaint   Patient presents with     Clinic Care Coordination - Follow-up     Pt here to review JUNIE Ramirez, THAIS

## 2019-10-17 ENCOUNTER — RECORDS - HEALTHEAST (OUTPATIENT)
Dept: LAB | Facility: CLINIC | Age: 79
End: 2019-10-17

## 2019-10-17 LAB — INR PPP: 2.78 (ref 0.9–1.1)

## 2019-10-21 ENCOUNTER — HOSPITAL ENCOUNTER (OUTPATIENT)
Dept: WOUND CARE | Facility: CLINIC | Age: 79
Discharge: HOME | End: 2019-10-21
Attending: SURGERY | Admitting: SURGERY
Payer: MEDICARE

## 2019-10-21 VITALS
DIASTOLIC BLOOD PRESSURE: 70 MMHG | SYSTOLIC BLOOD PRESSURE: 127 MMHG | RESPIRATION RATE: 18 BRPM | TEMPERATURE: 96.5 F | HEART RATE: 59 BPM

## 2019-10-21 DIAGNOSIS — I73.9 PAD (PERIPHERAL ARTERY DISEASE) (H): ICD-10-CM

## 2019-10-21 DIAGNOSIS — L97.522 SKIN ULCER OF TOE OF LEFT FOOT WITH FAT LAYER EXPOSED (H): ICD-10-CM

## 2019-10-21 PROCEDURE — 97597 DBRDMT OPN WND 1ST 20 CM/<: CPT | Performed by: SURGERY

## 2019-10-21 PROCEDURE — A6261 WOUND FILLER GEL/PASTE /OZ: HCPCS

## 2019-10-21 PROCEDURE — 97597 DBRDMT OPN WND 1ST 20 CM/<: CPT

## 2019-10-21 NOTE — DISCHARGE INSTRUCTIONS
[facility]  [address] [facility] [address], [place] [address]  Appointment Phone [phone] Nurse Advisors [phone]    Chance Shrestha 1940    [facility] Phone [phone] Fax [phone]    Wound Dressing Change to left plantar toe ulcer  Cleanse wound and surrounding skin with wound cleanser  Cover wound with Iodosorb  Cover wound with bandaid  Change dressing Monday, Wednesday and Friday    Alban Jones M.D. October 21, 2019    Call us at [phone] if you have any questions about your wounds, have redness or  swelling around your wound, have a fever of 101 or greater or if you have any other  problems or concerns. We answer the phone Monday through Friday 8 am to 4 pm,  please leave a message as we check the voicemail frequently throughout the day.    Follow up with Dr. Jones in 4 weeks

## 2019-10-21 NOTE — PROGRESS NOTES
[facility] Progress Note    Subject: Chance Shrestha returns with his wife.  He has significant PAD and we performed angiography on 7/17/2019 as documented.  We had limited runoff we are hoping that he has enough blood flow for healing to occur.  Ulcers in the right had previously healed.  We are watching a left posterior medial great toe ulcer.  They have been applying Iodosorb to the area.    Patient has no complaints.  Wants to get back to his physical therapy.  He does have good shoes with no pressure over the sites.    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)     PVD (peripheral vascular disease) (H)     SAH (subarachnoid hemorrhage) (H)     Status post amputation of right great toe (H)     Tobacco use     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)     Uncomplicated alcohol abuse     Vertigo as late effect of stroke     Warfarin anticoagulation     Skin ulcer of toe of left foot with fat layer exposed (H)     PAD (peripheral artery disease) (H)     Past Medical History:   Diagnosis Date     Constipation      Depression      DM type 2 (diabetes mellitus, type 2) (H)      GERD (gastroesophageal reflux disease)      Hernia, abdominal      HLD (hyperlipidemia)      HTN (hypertension)      Hypothyroid      Ischemic heart disease      Mumps     as child      Seizures (H)       Systolic CHF (H)      Exam:  /70 (BP Location: Left arm)   Pulse 59   Temp 96.5  F (35.8  C) (Temporal)   Resp 18   Wound (used by OP [facility] only) 06/12/19 0935 Left plantar 1 toe ulceration, arterial (Active)   Dressing Appearance open to air 10/21/2019 10:00 AM   Drainage Amount none 10/21/2019 10:00 AM     Alert and appropriate.  Left medial toe has no perceptible ulcer though there is an area in the center that needs to be evaluated.  Right great toe amputation site is healed over well.  There is a scab over the partial right second toe amputation site.  No erythema or edema on any area.    Procedure:   Patient was determined to be capable of making their own medical decisions and informed consent was obtained. Topical anesthetic of 4% lidocaine was applied, debridement was performed using a #15 blade and a selective fashion to remove the scab over the right second toe with no underlying ulcer.  On the left great toe with again selective debridement we do see a small ulcer measuring approximately 0.1 x 0.1 x 0.1 cm with bleeding at the base.  Bleeding controlled with light pressure. Patient tolerated procedure well.    Impression: Overall improvement.  Significant healing of the left great toe though there is a small ulcer noted following debridement today.  We will apply Iodosorb with a Q-tip into the site followed by a protective Band-Aid and do this on a daily basis.  No dressings are required of the right foot except for his socket appropriate athletic shoes that he is wearing.    Patient may reinitiate his physical therapy.    Plan: We will dress the wounds with Iodosorb to left great toe ulcer and Band-Aid.  Patient will return to the clinic in 4 weeks time    Alban Jones MD on 10/21/2019 at 10:22 AM

## 2019-10-24 ENCOUNTER — RECORDS - HEALTHEAST (OUTPATIENT)
Dept: LAB | Facility: CLINIC | Age: 79
End: 2019-10-24

## 2019-10-24 LAB — INR PPP: 3.46 (ref 0.9–1.1)

## 2019-10-30 ENCOUNTER — HOSPITAL ENCOUNTER (OUTPATIENT)
Dept: CT IMAGING | Facility: CLINIC | Age: 79
Discharge: HOME | End: 2019-10-30
Attending: UROLOGY | Admitting: UROLOGY
Payer: MEDICARE

## 2019-10-30 ENCOUNTER — OFFICE VISIT (OUTPATIENT)
Dept: UROLOGY | Facility: CLINIC | Age: 79
End: 2019-10-30
Payer: MEDICARE

## 2019-10-30 VITALS
BODY MASS INDEX: 24.52 KG/M2 | SYSTOLIC BLOOD PRESSURE: 138 MMHG | HEART RATE: 64 BPM | HEIGHT: 73 IN | DIASTOLIC BLOOD PRESSURE: 66 MMHG | WEIGHT: 185 LBS

## 2019-10-30 DIAGNOSIS — N20.1 LEFT URETERAL CALCULUS: Primary | ICD-10-CM

## 2019-10-30 DIAGNOSIS — N20.0 KIDNEY STONE: ICD-10-CM

## 2019-10-30 PROCEDURE — 74176 CT ABD & PELVIS W/O CONTRAST: CPT

## 2019-10-30 PROCEDURE — 99214 OFFICE O/P EST MOD 30 MIN: CPT | Performed by: UROLOGY

## 2019-10-30 RX ORDER — CIPROFLOXACIN 2 MG/ML
400 INJECTION, SOLUTION INTRAVENOUS EVERY 6 HOURS PRN
Status: CANCELLED | OUTPATIENT
Start: 2019-10-30

## 2019-10-30 RX ORDER — CIPROFLOXACIN 2 MG/ML
400 INJECTION, SOLUTION INTRAVENOUS
Status: CANCELLED | OUTPATIENT
Start: 2019-10-30

## 2019-10-30 ASSESSMENT — MIFFLIN-ST. JEOR: SCORE: 1598.03

## 2019-10-30 ASSESSMENT — PAIN SCALES - GENERAL: PAINLEVEL: NO PAIN (0)

## 2019-10-30 NOTE — LETTER
10/30/2019       RE: Chance Shrestha  [address]  [place] [address]     Dear Colleague,    Thank you for referring your patient, Chance Shrestha, to the [facility] at [facility]. Please see a copy of my visit note below.    History: It is a pleasure to see this very pleasant 79-year-old gentleman in follow-up consultation today  He had previously been seen on 10 October in our office and shortly before this visit had presented to the emergency room with severe left-sided flank pain with a past history of urinary stone disease having had shockwave treatment for stones in the past.  At that time a CT scan without contrast that showed a 6 mm stone in the proximal left ureter with mild proximal hydronephrosis and stranding.  At that time the symptoms have subsided, we did this KUB to see if we could see the stone on the KUB and could not see it.  I therefore decided we should observe this conservatively, for 2 weeks and then repeat the CT scan to see if the stone had progressed.  He returns today with few symptoms.  A new CT scan has been performed    CT ABDOMEN AND PELVIS WITHOUT CONTRAST   10/30/2019 1:49 PM      HISTORY: Kidney stone     TECHNIQUE: Noncontrast CT abdomen and pelvis was performed.  Radiation  dose for this scan was reduced using automated exposure control,  adjustment of the mA and/or kV according to patient size, or iterative  reconstruction technique.     COMPARISON: 10/5/2019     FINDINGS:   Lung bases: Lung bases are clear. No pleural effusion or pericardial  effusion.     Kidneys: No change in 6 mm stone in the mid left ureter without  associated hydronephrosis or hydroureter. No other renal, ureteral or  bladder stones. No right hydronephrosis or hydroureter.     Abdomen: Evaluation of the solid organ parenchyma is limited without  the use of intravenous contrast. The unenhanced appearance of  the  spleen, adrenal glands, liver and pancreas show no focal abnormality.  Postoperative changes of cholecystectomy. No intrahepatic or  extrahepatic biliary dilatation. No intraperitoneal free air or free  fluid. No dilated loops of small or large bowel. Diverticulosis  without evidence of diverticulitis. The appendix is normal. Extensive  atherosclerotic disease seen throughout the abdominal aorta. No  abdominal aortic aneurysm. Bladder is normal.     Bones: Postoperative changes of right hip arthroplasty. No suspicious  bony lesions.                                                                      IMPRESSION: Again noted there is a 6 mm stone in the mid left ureter,  this is not changed in position when compared to the study from  10/5/2019. No left hydronephrosis or hydroureter.     YAW CARTER DO    Careful review of the scan shows that the 6 mm stone in the mid ureter has not changed in  position significantly since the previous scan.  Past Medical History:   Diagnosis Date     Constipation      Depression      DM type 2 (diabetes mellitus, type 2) (H)      GERD (gastroesophageal reflux disease)      Hernia, abdominal      HLD (hyperlipidemia)      HTN (hypertension)      Hypothyroid      Ischemic heart disease      Mumps     as child      Seizures (H)      Systolic CHF (H)        Social History     Socioeconomic History     Marital status:      Spouse name: None     Number of children: None     Years of education: None     Highest education level: None   Occupational History     None   Social Needs     Financial resource strain: None     Food insecurity:     Worry: None     Inability: None     Transportation needs:     Medical: None     Non-medical: None   Tobacco Use     Smoking status: Never Smoker     Smokeless tobacco: Never Used   Substance and Sexual Activity     Alcohol use: No     Drug use: No     Sexual activity: None   Lifestyle     Physical activity:     Days per week: None      Minutes per session: None     Stress: None   Relationships     Social connections:     Talks on phone: None     Gets together: None     Attends Yazdanism service: None     Active member of club or organization: None     Attends meetings of clubs or organizations: None     Relationship status: None     Intimate partner violence:     Fear of current or ex partner: None     Emotionally abused: None     Physically abused: None     Forced sexual activity: None   Other Topics Concern     Parent/sibling w/ CABG, MI or angioplasty before 65F 55M? Not Asked   Social History Narrative     None       Past Surgical History:   Procedure Laterality Date     HERNIA REPAIR       IR LOWER EXTREMITY ANGIOGRAM BILATERAL  7/17/2019     ORTHOPEDIC SURGERY Right     Hip replacement       No family history on file.      Current Outpatient Medications:      acetaminophen (PHARBETOL) 325 MG tablet, Take 650 mg by mouth, Disp: , Rfl:      amLODIPine (NORVASC) 10 MG tablet, Take 10 mg by mouth daily , Disp: , Rfl:      atorvastatin (LIPITOR) 80 MG tablet, Take 80 mg by mouth daily , Disp: , Rfl:      enoxaparin (LOVENOX) 80 MG/0.8ML syringe, INJECT 0.8 MLS (80 MG) SUBCUTANEOUS 2 TIMES DAILY, Disp: 12 Syringe, Rfl: 0     ezetimibe (ZETIA) 10 MG tablet, Take 1 tablet (10 mg) by mouth daily, Disp: 90 tablet, Rfl: 3     fluticasone (FLONASE) 50 MCG/ACT nasal spray, Spray 1 spray into both nostrils daily, Disp: , Rfl:      glimepiride (AMARYL) 1 MG tablet, Take 1 tablet (1 mg) by mouth every morning (before breakfast), Disp: 90 tablet, Rfl: 1     levothyroxine (SYNTHROID/LEVOTHROID) 25 MCG tablet, Take 25 mcg by mouth daily , Disp: , Rfl:      lisinopril (PRINIVIL/ZESTRIL) 20 MG tablet, Take 20 mg by mouth daily , Disp: , Rfl:      metFORMIN (GLUCOPHAGE) 1000 MG tablet, Take 1,000 mg by mouth 2 times daily (with meals) , Disp: , Rfl:      metoprolol tartrate (LOPRESSOR) 100 MG tablet, Take 100 mg by mouth 2 times daily , Disp: , Rfl:       "mirtazapine (REMERON) 15 MG tablet, Take 15 mg by mouth At Bedtime, Disp: , Rfl:      Multiple Vitamin (DAILY VITES) TABS, Take 1 tablet by mouth daily , Disp: , Rfl:      ondansetron (ZOFRAN-ODT) 4 MG ODT tab, Take 1 tablet (4 mg) by mouth every 6 hours as needed for nausea, Disp: 10 tablet, Rfl: 0     order for [facility], [facility] [place]:Phone: [phone] Fax: [phone] [place]: Phone [phone] Fax [phone] Please call Patient to schedule Evaluate and Treat as indicated  Left Foot Modification to AFO or new AFO, non healing ulcer of left great toe and rubbing on left ankle, Disp: 1 Device, Rfl: 0     polyethylene glycol (MIRALAX/GLYCOLAX) powder, Take 17 g by mouth daily as needed , Disp: , Rfl:      ranitidine (ZANTAC) 75 MG tablet, Take 75 mg by mouth 2 times daily, Disp: , Rfl:      venlafaxine (EFFEXOR-XR) 37.5 MG 24 hr capsule, Take 37.5 mg by mouth daily With venlafaxine ER 75 mg, Disp: , Rfl:      venlafaxine (EFFEXOR-XR) 75 MG 24 hr capsule, Take 75 mg by mouth daily With venlafaxine er 37.5 mg, Disp: , Rfl:      vitamin D3 (VITAMIN D3) 1000 units (25 mcg) tablet, Take 5,000 Units by mouth , Disp: , Rfl:      warfarin (COUMADIN) 7.5 MG tablet, 7.5 mg daily , Disp: , Rfl:     10 point ROS of systems including Constitutional, Eyes, Respiratory, Cardiovascular, Gastroenterology, Genitourinary, Integumentary, Muscularskeletal, Psychiatric and Neurologic were all negative except for pertinent positives noted in my HPI.    Examination:   /66 (BP Location: Right arm)   Pulse 64   Ht 1.854 m (6' 1\")   Wt 83.9 kg (185 lb)   BMI 24.41 kg/m     General Impression: Very pleasant patient in no acute distress, well-oriented in time place and person and quite conversational  Mental Status: Normal  HEENT: Extraocular movements intact.  No clinical evidence of jaundice on examination of eyes.  Mucous membranes are unremarkable  Skin: Warm.  No other abnormalities  Respiratory " System: Unlabored on room air.  Respiratory cycle normal  Lymph Nodes: Negative  Back/Flank Tenderness: There is no significant flank tenderness at the moment  Cardiovascular System: No significant peripheral pitting edema  Abdominal Examination: Abdomen is mildly obese  Extremities: The extremities are otherwise unremarkable  Genitial: Not examined  Rectal Examination: Not examined  Neurologic System: There are no significant acute abnormal neurological signs in the central or peripheral nervous systems    Impression: I reviewed the records, labs and the previous and current radiologic studies.  We had a discussion about how to manage the situation.  The patient is really not very willing to continue conservative management.  It would therefore be reasonable in my opinion the plan for left ureteroscopy with holmium laser lithotripsy.  He does take Coumadin which she will have to discontinue 5 days before the surgery he has been taking this chronically for history of atrial fibrillation.  We will also hold diabetic medication of the day of surgery  I discussed the situation in detail with the patient today.  I went over the procedure and how it is performed in all the issues related to it including side effects complications explained that the stent will be placed which will be taken out in the office about a week later, that the stone will be sent for analysis and that we will have discussions about preventative measures following removal of the stone and identification of the stone analysis.  I did discuss the entire situation with the patient and his wife in detail today.  I answered all the questions      Plan: Left ureteroscopy, holmium laser lithotripsy etc.  Coumadin will stop 5 days before surgery.  Diabetic medication will be withheld on the day of surgery    Time: 25 minutes, greater than 50% in discussion and consultation careful review of all records both current and previous, labs and radiologic studies  "careful explanation of situation, alternative therapeutic options and then in detail discussions of the planned treatment option as noted above, with discussion about the need for preventative measures for uterine prevent stones forming subsequently.    \"This dictation was performed with voice recognition software and may contain errors,  omissions and inadvertent word substitution.\"        Again, thank you for allowing me to participate in the care of your patient.      Sincerely,    Donnie Morales MD      "

## 2019-10-30 NOTE — PROGRESS NOTES
History: It is a pleasure to see this very pleasant 79-year-old gentleman in follow-up consultation today  He had previously been seen on 10 October in our office and shortly before this visit had presented to the emergency room with severe left-sided flank pain with a past history of urinary stone disease having had shockwave treatment for stones in the past.  At that time a CT scan without contrast that showed a 6 mm stone in the proximal left ureter with mild proximal hydronephrosis and stranding.  At that time the symptoms have subsided, we did this KUB to see if we could see the stone on the KUB and could not see it.  I therefore decided we should observe this conservatively, for 2 weeks and then repeat the CT scan to see if the stone had progressed.  He returns today with few symptoms.  A new CT scan has been performed    CT ABDOMEN AND PELVIS WITHOUT CONTRAST   10/30/2019 1:49 PM      HISTORY: Kidney stone     TECHNIQUE: Noncontrast CT abdomen and pelvis was performed.  Radiation  dose for this scan was reduced using automated exposure control,  adjustment of the mA and/or kV according to patient size, or iterative  reconstruction technique.     COMPARISON: 10/5/2019     FINDINGS:   Lung bases: Lung bases are clear. No pleural effusion or pericardial  effusion.     Kidneys: No change in 6 mm stone in the mid left ureter without  associated hydronephrosis or hydroureter. No other renal, ureteral or  bladder stones. No right hydronephrosis or hydroureter.     Abdomen: Evaluation of the solid organ parenchyma is limited without  the use of intravenous contrast. The unenhanced appearance of the  spleen, adrenal glands, liver and pancreas show no focal abnormality.  Postoperative changes of cholecystectomy. No intrahepatic or  extrahepatic biliary dilatation. No intraperitoneal free air or free  fluid. No dilated loops of small or large bowel. Diverticulosis  without evidence of diverticulitis. The appendix is  normal. Extensive  atherosclerotic disease seen throughout the abdominal aorta. No  abdominal aortic aneurysm. Bladder is normal.     Bones: Postoperative changes of right hip arthroplasty. No suspicious  bony lesions.                                                                      IMPRESSION: Again noted there is a 6 mm stone in the mid left ureter,  this is not changed in position when compared to the study from  10/5/2019. No left hydronephrosis or hydroureter.     YAW CARTER DO    Careful review of the scan shows that the 6 mm stone in the mid ureter has not changed in  position significantly since the previous scan.  Past Medical History:   Diagnosis Date     Constipation      Depression      DM type 2 (diabetes mellitus, type 2) (H)      GERD (gastroesophageal reflux disease)      Hernia, abdominal      HLD (hyperlipidemia)      HTN (hypertension)      Hypothyroid      Ischemic heart disease      Mumps     as child      Seizures (H)      Systolic CHF (H)        Social History     Socioeconomic History     Marital status:      Spouse name: None     Number of children: None     Years of education: None     Highest education level: None   Occupational History     None   Social Needs     Financial resource strain: None     Food insecurity:     Worry: None     Inability: None     Transportation needs:     Medical: None     Non-medical: None   Tobacco Use     Smoking status: Never Smoker     Smokeless tobacco: Never Used   Substance and Sexual Activity     Alcohol use: No     Drug use: No     Sexual activity: None   Lifestyle     Physical activity:     Days per week: None     Minutes per session: None     Stress: None   Relationships     Social connections:     Talks on phone: None     Gets together: None     Attends Spiritism service: None     Active member of club or organization: None     Attends meetings of clubs or organizations: None     Relationship status: None     Intimate partner  violence:     Fear of current or ex partner: None     Emotionally abused: None     Physically abused: None     Forced sexual activity: None   Other Topics Concern     Parent/sibling w/ CABG, MI or angioplasty before 65F 55M? Not Asked   Social History Narrative     None       Past Surgical History:   Procedure Laterality Date     HERNIA REPAIR       IR LOWER EXTREMITY ANGIOGRAM BILATERAL  7/17/2019     ORTHOPEDIC SURGERY Right     Hip replacement       No family history on file.      Current Outpatient Medications:      acetaminophen (PHARBETOL) 325 MG tablet, Take 650 mg by mouth, Disp: , Rfl:      amLODIPine (NORVASC) 10 MG tablet, Take 10 mg by mouth daily , Disp: , Rfl:      atorvastatin (LIPITOR) 80 MG tablet, Take 80 mg by mouth daily , Disp: , Rfl:      enoxaparin (LOVENOX) 80 MG/0.8ML syringe, INJECT 0.8 MLS (80 MG) SUBCUTANEOUS 2 TIMES DAILY, Disp: 12 Syringe, Rfl: 0     ezetimibe (ZETIA) 10 MG tablet, Take 1 tablet (10 mg) by mouth daily, Disp: 90 tablet, Rfl: 3     fluticasone (FLONASE) 50 MCG/ACT nasal spray, Spray 1 spray into both nostrils daily, Disp: , Rfl:      glimepiride (AMARYL) 1 MG tablet, Take 1 tablet (1 mg) by mouth every morning (before breakfast), Disp: 90 tablet, Rfl: 1     levothyroxine (SYNTHROID/LEVOTHROID) 25 MCG tablet, Take 25 mcg by mouth daily , Disp: , Rfl:      lisinopril (PRINIVIL/ZESTRIL) 20 MG tablet, Take 20 mg by mouth daily , Disp: , Rfl:      metFORMIN (GLUCOPHAGE) 1000 MG tablet, Take 1,000 mg by mouth 2 times daily (with meals) , Disp: , Rfl:      metoprolol tartrate (LOPRESSOR) 100 MG tablet, Take 100 mg by mouth 2 times daily , Disp: , Rfl:      mirtazapine (REMERON) 15 MG tablet, Take 15 mg by mouth At Bedtime, Disp: , Rfl:      Multiple Vitamin (DAILY VITES) TABS, Take 1 tablet by mouth daily , Disp: , Rfl:      ondansetron (ZOFRAN-ODT) 4 MG ODT tab, Take 1 tablet (4 mg) by mouth every 6 hours as needed for nausea, Disp: 10 tablet, Rfl: 0     order for DME,  "[facility] [place]:Phone: [phone] Fax: [phone] [place]: Phone [phone] Fax [phone] Please call Patient to schedule Evaluate and Treat as indicated  Left Foot Modification to AFO or new AFO, non healing ulcer of left great toe and rubbing on left ankle, Disp: 1 Device, Rfl: 0     polyethylene glycol (MIRALAX/GLYCOLAX) powder, Take 17 g by mouth daily as needed , Disp: , Rfl:      ranitidine (ZANTAC) 75 MG tablet, Take 75 mg by mouth 2 times daily, Disp: , Rfl:      venlafaxine (EFFEXOR-XR) 37.5 MG 24 hr capsule, Take 37.5 mg by mouth daily With venlafaxine ER 75 mg, Disp: , Rfl:      venlafaxine (EFFEXOR-XR) 75 MG 24 hr capsule, Take 75 mg by mouth daily With venlafaxine er 37.5 mg, Disp: , Rfl:      vitamin D3 (VITAMIN D3) 1000 units (25 mcg) tablet, Take 5,000 Units by mouth , Disp: , Rfl:      warfarin (COUMADIN) 7.5 MG tablet, 7.5 mg daily , Disp: , Rfl:     10 point ROS of systems including Constitutional, Eyes, Respiratory, Cardiovascular, Gastroenterology, Genitourinary, Integumentary, Muscularskeletal, Psychiatric and Neurologic were all negative except for pertinent positives noted in my HPI.    Examination:   /66 (BP Location: Right arm)   Pulse 64   Ht 1.854 m (6' 1\")   Wt 83.9 kg (185 lb)   BMI 24.41 kg/m    General Impression: Very pleasant patient in no acute distress, well-oriented in time place and person and quite conversational  Mental Status: Normal  HEENT: Extraocular movements intact.  No clinical evidence of jaundice on examination of eyes.  Mucous membranes are unremarkable  Skin: Warm.  No other abnormalities  Respiratory System: Unlabored on room air.  Respiratory cycle normal  Lymph Nodes: Negative  Back/Flank Tenderness: There is no significant flank tenderness at the moment  Cardiovascular System: No significant peripheral pitting edema  Abdominal Examination: Abdomen is mildly obese  Extremities: The extremities are otherwise " "unremarkable  Genitial: Not examined  Rectal Examination: Not examined  Neurologic System: There are no significant acute abnormal neurological signs in the central or peripheral nervous systems    Impression: I reviewed the records, labs and the previous and current radiologic studies.  We had a discussion about how to manage the situation.  The patient is really not very willing to continue conservative management.  It would therefore be reasonable in my opinion the plan for left ureteroscopy with holmium laser lithotripsy.  He does take Coumadin which she will have to discontinue 5 days before the surgery he has been taking this chronically for history of atrial fibrillation.  We will also hold diabetic medication of the day of surgery  I discussed the situation in detail with the patient today.  I went over the procedure and how it is performed in all the issues related to it including side effects complications explained that the stent will be placed which will be taken out in the office about a week later, that the stone will be sent for analysis and that we will have discussions about preventative measures following removal of the stone and identification of the stone analysis.  I did discuss the entire situation with the patient and his wife in detail today.  I answered all the questions      Plan: Left ureteroscopy, holmium laser lithotripsy etc.  Coumadin will stop 5 days before surgery.  Diabetic medication will be withheld on the day of surgery    Time: 25 minutes, greater than 50% in discussion and consultation careful review of all records both current and previous, labs and radiologic studies careful explanation of situation, alternative therapeutic options and then in detail discussions of the planned treatment option as noted above, with discussion about the need for preventative measures for uterine prevent stones forming subsequently.    \"This dictation was performed with voice recognition " "software and may contain errors,  omissions and inadvertent word substitution.\"      "

## 2019-10-31 ENCOUNTER — RECORDS - HEALTHEAST (OUTPATIENT)
Dept: LAB | Facility: CLINIC | Age: 79
End: 2019-10-31

## 2019-10-31 LAB — INR PPP: 2.77 (ref 0.9–1.1)

## 2019-11-04 ENCOUNTER — RECORDS - HEALTHEAST (OUTPATIENT)
Dept: LAB | Facility: CLINIC | Age: 79
End: 2019-11-04

## 2019-11-04 ENCOUNTER — TRANSFERRED RECORDS (OUTPATIENT)
Dept: HEALTH INFORMATION MANAGEMENT | Facility: CLINIC | Age: 79
End: 2019-11-04

## 2019-11-04 LAB
ANION GAP SERPL CALCULATED.3IONS-SCNC: 8 MMOL/L (ref 5–18)
BASOPHILS # BLD AUTO: 0.1 THOU/UL (ref 0–0.2)
BASOPHILS NFR BLD AUTO: 1 % (ref 0–2)
BUN SERPL-MCNC: 19 MG/DL (ref 8–28)
CALCIUM SERPL-MCNC: 9.5 MG/DL (ref 8.5–10.5)
CHLORIDE BLD-SCNC: 102 MMOL/L (ref 98–107)
CO2 SERPL-SCNC: 25 MMOL/L (ref 22–31)
CREAT SERPL-MCNC: 1.6 MG/DL (ref 0.7–1.3)
CREAT SERPL-MCNC: 1.6 MG/DL (ref 0.7–1.3)
EOSINOPHIL # BLD AUTO: 0.1 THOU/UL (ref 0–0.4)
EOSINOPHIL NFR BLD AUTO: 1 % (ref 0–6)
ERYTHROCYTE [DISTWIDTH] IN BLOOD BY AUTOMATED COUNT: 13.1 % (ref 11–14.5)
GFR SERPL CREATININE-BSD FRML MDRD: 42 ML/MIN/1.73M2
GLUCOSE BLD-MCNC: 222 MG/DL (ref 70–125)
GLUCOSE SERPL-MCNC: 222 MG/DL (ref 70–125)
HCT VFR BLD AUTO: 36 % (ref 40–54)
HGB BLD-MCNC: 11.9 G/DL (ref 14–18)
INR PPP: 1.08 (ref 0.9–1.1)
INR PPP: 1.08 (ref 0.9–1.1)
LYMPHOCYTES # BLD AUTO: 2.7 THOU/UL (ref 0.8–4.4)
LYMPHOCYTES NFR BLD AUTO: 29 % (ref 20–40)
MCH RBC QN AUTO: 30.8 PG (ref 27–34)
MCHC RBC AUTO-ENTMCNC: 33.1 G/DL (ref 32–36)
MCV RBC AUTO: 93 FL (ref 80–100)
MONOCYTES # BLD AUTO: 0.8 THOU/UL (ref 0–0.9)
MONOCYTES NFR BLD AUTO: 9 % (ref 2–10)
NEUTROPHILS # BLD AUTO: 5.6 THOU/UL (ref 2–7.7)
NEUTROPHILS NFR BLD AUTO: 61 % (ref 50–70)
PLATELET # BLD AUTO: 327 THOU/UL (ref 140–440)
PMV BLD AUTO: 10.5 FL (ref 8.5–12.5)
POTASSIUM BLD-SCNC: 4.6 MMOL/L (ref 3.5–5)
POTASSIUM SERPL-SCNC: 4.6 MMOL/L (ref 3.5–5)
RBC # BLD AUTO: 3.86 MILL/UL (ref 4.4–6.2)
SODIUM SERPL-SCNC: 135 MMOL/L (ref 136–145)
WBC: 9.2 THOU/UL (ref 4–11)

## 2019-11-05 ENCOUNTER — APPOINTMENT (OUTPATIENT)
Dept: GENERAL RADIOLOGY | Facility: CLINIC | Age: 79
End: 2019-11-05
Attending: UROLOGY
Payer: MEDICARE

## 2019-11-05 ENCOUNTER — ANESTHESIA EVENT (OUTPATIENT)
Dept: SURGERY | Facility: CLINIC | Age: 79
End: 2019-11-05
Payer: MEDICARE

## 2019-11-05 ENCOUNTER — ANESTHESIA (OUTPATIENT)
Dept: SURGERY | Facility: CLINIC | Age: 79
End: 2019-11-05
Payer: MEDICARE

## 2019-11-05 ENCOUNTER — HOSPITAL ENCOUNTER (OUTPATIENT)
Facility: CLINIC | Age: 79
Discharge: NURSING, REHAB OR LONG-TERM CARE | End: 2019-11-05
Attending: UROLOGY | Admitting: UROLOGY
Payer: MEDICARE

## 2019-11-05 VITALS
WEIGHT: 164.6 LBS | BODY MASS INDEX: 21.81 KG/M2 | DIASTOLIC BLOOD PRESSURE: 82 MMHG | SYSTOLIC BLOOD PRESSURE: 173 MMHG | HEIGHT: 73 IN | TEMPERATURE: 98.3 F | HEART RATE: 58 BPM | OXYGEN SATURATION: 92 % | RESPIRATION RATE: 16 BRPM

## 2019-11-05 DIAGNOSIS — N20.1 CALCULUS OF URETER: Primary | ICD-10-CM

## 2019-11-05 LAB
GLUCOSE BLDC GLUCOMTR-MCNC: 203 MG/DL (ref 70–99)
GLUCOSE BLDC GLUCOMTR-MCNC: 204 MG/DL (ref 70–99)
INR PPP: 1.12 (ref 0.86–1.14)

## 2019-11-05 PROCEDURE — P9041 ALBUMIN (HUMAN),5%, 50ML: HCPCS | Performed by: NURSE ANESTHETIST, CERTIFIED REGISTERED

## 2019-11-05 PROCEDURE — 37000008 ZZH ANESTHESIA TECHNICAL FEE, 1ST 30 MIN: Performed by: UROLOGY

## 2019-11-05 PROCEDURE — 25000128 H RX IP 250 OP 636: Performed by: NURSE ANESTHETIST, CERTIFIED REGISTERED

## 2019-11-05 PROCEDURE — 36000056 ZZH SURGERY LEVEL 3 1ST 30 MIN: Performed by: UROLOGY

## 2019-11-05 PROCEDURE — 27210794 ZZH OR GENERAL SUPPLY STERILE: Performed by: UROLOGY

## 2019-11-05 PROCEDURE — 36000058 ZZH SURGERY LEVEL 3 EA 15 ADDTL MIN: Performed by: UROLOGY

## 2019-11-05 PROCEDURE — C1758 CATHETER, URETERAL: HCPCS | Performed by: UROLOGY

## 2019-11-05 PROCEDURE — 52356 CYSTO/URETERO W/LITHOTRIPSY: CPT | Mod: LT | Performed by: UROLOGY

## 2019-11-05 PROCEDURE — C1769 GUIDE WIRE: HCPCS | Performed by: UROLOGY

## 2019-11-05 PROCEDURE — 40000170 ZZH STATISTIC PRE-PROCEDURE ASSESSMENT II: Performed by: UROLOGY

## 2019-11-05 PROCEDURE — 88300 SURGICAL PATH GROSS: CPT | Performed by: UROLOGY

## 2019-11-05 PROCEDURE — 36415 COLL VENOUS BLD VENIPUNCTURE: CPT | Performed by: ANESTHESIOLOGY

## 2019-11-05 PROCEDURE — 25000125 ZZHC RX 250: Performed by: UROLOGY

## 2019-11-05 PROCEDURE — 88300 SURGICAL PATH GROSS: CPT | Mod: 26 | Performed by: UROLOGY

## 2019-11-05 PROCEDURE — C2617 STENT, NON-COR, TEM W/O DEL: HCPCS | Performed by: UROLOGY

## 2019-11-05 PROCEDURE — 71000027 ZZH RECOVERY PHASE 2 EACH 15 MINS: Performed by: UROLOGY

## 2019-11-05 PROCEDURE — 74019 RADEX ABDOMEN 2 VIEWS: CPT

## 2019-11-05 PROCEDURE — 25800030 ZZH RX IP 258 OP 636: Performed by: ANESTHESIOLOGY

## 2019-11-05 PROCEDURE — 25800030 ZZH RX IP 258 OP 636: Performed by: NURSE ANESTHETIST, CERTIFIED REGISTERED

## 2019-11-05 PROCEDURE — 25000566 ZZH SEVOFLURANE, EA 15 MIN: Performed by: UROLOGY

## 2019-11-05 PROCEDURE — 71000012 ZZH RECOVERY PHASE 1 LEVEL 1 FIRST HR: Performed by: UROLOGY

## 2019-11-05 PROCEDURE — 82962 GLUCOSE BLOOD TEST: CPT | Mod: 91

## 2019-11-05 PROCEDURE — 25000128 H RX IP 250 OP 636: Performed by: UROLOGY

## 2019-11-05 PROCEDURE — 40000277 XR SURGERY CARM FLUORO LESS THAN 5 MIN W STILLS

## 2019-11-05 PROCEDURE — 74420 UROGRAPHY RTRGR +-KUB: CPT | Mod: 26 | Performed by: UROLOGY

## 2019-11-05 PROCEDURE — 25000125 ZZHC RX 250: Performed by: NURSE ANESTHETIST, CERTIFIED REGISTERED

## 2019-11-05 PROCEDURE — 25500064 ZZH RX 255 OP 636: Performed by: UROLOGY

## 2019-11-05 PROCEDURE — 71000013 ZZH RECOVERY PHASE 1 LEVEL 1 EA ADDTL HR: Performed by: UROLOGY

## 2019-11-05 PROCEDURE — 37000009 ZZH ANESTHESIA TECHNICAL FEE, EACH ADDTL 15 MIN: Performed by: UROLOGY

## 2019-11-05 PROCEDURE — 82365 CALCULUS SPECTROSCOPY: CPT | Performed by: UROLOGY

## 2019-11-05 PROCEDURE — 85610 PROTHROMBIN TIME: CPT | Performed by: ANESTHESIOLOGY

## 2019-11-05 DEVICE — STENT URETERAL CONTOUR SOFT PERCUFLEX 6FRX28CM: Type: IMPLANTABLE DEVICE | Site: URETER | Status: FUNCTIONAL

## 2019-11-05 RX ORDER — ONDANSETRON 4 MG/1
4 TABLET, ORALLY DISINTEGRATING ORAL EVERY 30 MIN PRN
Status: DISCONTINUED | OUTPATIENT
Start: 2019-11-05 | End: 2019-11-05 | Stop reason: HOSPADM

## 2019-11-05 RX ORDER — EPHEDRINE SULFATE 50 MG/ML
INJECTION, SOLUTION INTRAMUSCULAR; INTRAVENOUS; SUBCUTANEOUS PRN
Status: DISCONTINUED | OUTPATIENT
Start: 2019-11-05 | End: 2019-11-05

## 2019-11-05 RX ORDER — KETOROLAC TROMETHAMINE 10 MG/1
10 TABLET, FILM COATED ORAL EVERY 6 HOURS PRN
Qty: 10 TABLET | Refills: 0 | Status: SHIPPED | OUTPATIENT
Start: 2019-11-05

## 2019-11-05 RX ORDER — RISPERIDONE 1 MG/1
1 TABLET ORAL EVERY EVENING
COMMUNITY

## 2019-11-05 RX ORDER — NALOXONE HYDROCHLORIDE 0.4 MG/ML
.1-.4 INJECTION, SOLUTION INTRAMUSCULAR; INTRAVENOUS; SUBCUTANEOUS
Status: DISCONTINUED | OUTPATIENT
Start: 2019-11-05 | End: 2019-11-05 | Stop reason: HOSPADM

## 2019-11-05 RX ORDER — MEPERIDINE HYDROCHLORIDE 25 MG/ML
12.5 INJECTION INTRAMUSCULAR; INTRAVENOUS; SUBCUTANEOUS
Status: DISCONTINUED | OUTPATIENT
Start: 2019-11-05 | End: 2019-11-05 | Stop reason: HOSPADM

## 2019-11-05 RX ORDER — HYDRALAZINE HYDROCHLORIDE 20 MG/ML
2.5-5 INJECTION INTRAMUSCULAR; INTRAVENOUS EVERY 10 MIN PRN
Status: DISCONTINUED | OUTPATIENT
Start: 2019-11-05 | End: 2019-11-05 | Stop reason: HOSPADM

## 2019-11-05 RX ORDER — TRAMADOL HYDROCHLORIDE 50 MG/1
50 TABLET ORAL EVERY 6 HOURS PRN
COMMUNITY

## 2019-11-05 RX ORDER — DEXAMETHASONE SODIUM PHOSPHATE 4 MG/ML
4 INJECTION, SOLUTION INTRA-ARTICULAR; INTRALESIONAL; INTRAMUSCULAR; INTRAVENOUS; SOFT TISSUE EVERY 10 MIN PRN
Status: DISCONTINUED | OUTPATIENT
Start: 2019-11-05 | End: 2019-11-05 | Stop reason: HOSPADM

## 2019-11-05 RX ORDER — WARFARIN SODIUM 5 MG/1
5 TABLET ORAL DAILY
COMMUNITY

## 2019-11-05 RX ORDER — CIPROFLOXACIN 2 MG/ML
400 INJECTION, SOLUTION INTRAVENOUS EVERY 6 HOURS PRN
Status: DISCONTINUED | OUTPATIENT
Start: 2019-11-05 | End: 2019-11-05 | Stop reason: HOSPADM

## 2019-11-05 RX ORDER — KETOROLAC TROMETHAMINE 10 MG/1
10 TABLET, FILM COATED ORAL EVERY 6 HOURS PRN
Qty: 10 TABLET | Refills: 0 | Status: SHIPPED | OUTPATIENT
Start: 2019-11-05 | End: 2019-11-05

## 2019-11-05 RX ORDER — DEXAMETHASONE SODIUM PHOSPHATE 4 MG/ML
INJECTION, SOLUTION INTRA-ARTICULAR; INTRALESIONAL; INTRAMUSCULAR; INTRAVENOUS; SOFT TISSUE PRN
Status: DISCONTINUED | OUTPATIENT
Start: 2019-11-05 | End: 2019-11-05

## 2019-11-05 RX ORDER — ONDANSETRON 2 MG/ML
INJECTION INTRAMUSCULAR; INTRAVENOUS PRN
Status: DISCONTINUED | OUTPATIENT
Start: 2019-11-05 | End: 2019-11-05

## 2019-11-05 RX ORDER — CIPROFLOXACIN 250 MG/1
250 TABLET, FILM COATED ORAL 2 TIMES DAILY
Qty: 2 TABLET | Refills: 0 | Status: SHIPPED | OUTPATIENT
Start: 2019-11-05 | End: 2020-04-15

## 2019-11-05 RX ORDER — FENTANYL CITRATE 50 UG/ML
25-50 INJECTION, SOLUTION INTRAMUSCULAR; INTRAVENOUS
Status: DISCONTINUED | OUTPATIENT
Start: 2019-11-05 | End: 2019-11-05 | Stop reason: HOSPADM

## 2019-11-05 RX ORDER — LIDOCAINE HYDROCHLORIDE 20 MG/ML
INJECTION, SOLUTION INFILTRATION; PERINEURAL PRN
Status: DISCONTINUED | OUTPATIENT
Start: 2019-11-05 | End: 2019-11-05

## 2019-11-05 RX ORDER — HYDROMORPHONE HYDROCHLORIDE 1 MG/ML
.3-.5 INJECTION, SOLUTION INTRAMUSCULAR; INTRAVENOUS; SUBCUTANEOUS EVERY 10 MIN PRN
Status: DISCONTINUED | OUTPATIENT
Start: 2019-11-05 | End: 2019-11-05 | Stop reason: HOSPADM

## 2019-11-05 RX ORDER — ALBUMIN, HUMAN INJ 5% 5 %
SOLUTION INTRAVENOUS CONTINUOUS PRN
Status: DISCONTINUED | OUTPATIENT
Start: 2019-11-05 | End: 2019-11-05

## 2019-11-05 RX ORDER — ONDANSETRON 2 MG/ML
4 INJECTION INTRAMUSCULAR; INTRAVENOUS EVERY 30 MIN PRN
Status: DISCONTINUED | OUTPATIENT
Start: 2019-11-05 | End: 2019-11-05 | Stop reason: HOSPADM

## 2019-11-05 RX ORDER — LABETALOL HYDROCHLORIDE 5 MG/ML
10 INJECTION, SOLUTION INTRAVENOUS
Status: DISCONTINUED | OUTPATIENT
Start: 2019-11-05 | End: 2019-11-05 | Stop reason: HOSPADM

## 2019-11-05 RX ORDER — PROPOFOL 10 MG/ML
INJECTION, EMULSION INTRAVENOUS PRN
Status: DISCONTINUED | OUTPATIENT
Start: 2019-11-05 | End: 2019-11-05

## 2019-11-05 RX ORDER — CIPROFLOXACIN 250 MG/1
250 TABLET, FILM COATED ORAL 2 TIMES DAILY
Qty: 2 TABLET | Refills: 0 | Status: SHIPPED | OUTPATIENT
Start: 2019-11-05 | End: 2019-11-05

## 2019-11-05 RX ORDER — CIPROFLOXACIN 2 MG/ML
400 INJECTION, SOLUTION INTRAVENOUS
Status: COMPLETED | OUTPATIENT
Start: 2019-11-05 | End: 2019-11-05

## 2019-11-05 RX ORDER — FENTANYL CITRATE 50 UG/ML
INJECTION, SOLUTION INTRAMUSCULAR; INTRAVENOUS PRN
Status: DISCONTINUED | OUTPATIENT
Start: 2019-11-05 | End: 2019-11-05

## 2019-11-05 RX ORDER — ACETAMINOPHEN 325 MG/1
650 TABLET ORAL EVERY 6 HOURS PRN
COMMUNITY

## 2019-11-05 RX ORDER — SODIUM CHLORIDE, SODIUM LACTATE, POTASSIUM CHLORIDE, CALCIUM CHLORIDE 600; 310; 30; 20 MG/100ML; MG/100ML; MG/100ML; MG/100ML
INJECTION, SOLUTION INTRAVENOUS CONTINUOUS
Status: DISCONTINUED | OUTPATIENT
Start: 2019-11-05 | End: 2019-11-05 | Stop reason: HOSPADM

## 2019-11-05 RX ADMIN — PHENYLEPHRINE HYDROCHLORIDE 0.4 MCG/KG/MIN: 10 INJECTION INTRAVENOUS at 08:33

## 2019-11-05 RX ADMIN — Medication 15 MG: at 08:26

## 2019-11-05 RX ADMIN — PHENYLEPHRINE HYDROCHLORIDE 300 MCG: 10 INJECTION INTRAVENOUS at 08:29

## 2019-11-05 RX ADMIN — ALBUMIN HUMAN: 0.05 INJECTION, SOLUTION INTRAVENOUS at 08:37

## 2019-11-05 RX ADMIN — PHENYLEPHRINE HYDROCHLORIDE 200 MCG: 10 INJECTION INTRAVENOUS at 08:35

## 2019-11-05 RX ADMIN — PHENYLEPHRINE HYDROCHLORIDE 100 MCG: 10 INJECTION INTRAVENOUS at 08:22

## 2019-11-05 RX ADMIN — LIDOCAINE HYDROCHLORIDE 100 MG: 20 INJECTION, SOLUTION INFILTRATION; PERINEURAL at 08:12

## 2019-11-05 RX ADMIN — PROPOFOL 50 MG: 10 INJECTION, EMULSION INTRAVENOUS at 08:13

## 2019-11-05 RX ADMIN — CIPROFLOXACIN 400 MG: 2 INJECTION INTRAVENOUS at 08:19

## 2019-11-05 RX ADMIN — PHENYLEPHRINE HYDROCHLORIDE 200 MCG: 10 INJECTION INTRAVENOUS at 08:26

## 2019-11-05 RX ADMIN — FENTANYL CITRATE 25 MCG: 50 INJECTION, SOLUTION INTRAMUSCULAR; INTRAVENOUS at 08:47

## 2019-11-05 RX ADMIN — PROPOFOL 50 MG: 10 INJECTION, EMULSION INTRAVENOUS at 08:50

## 2019-11-05 RX ADMIN — PROPOFOL 150 MG: 10 INJECTION, EMULSION INTRAVENOUS at 08:12

## 2019-11-05 RX ADMIN — FENTANYL CITRATE 25 MCG: 50 INJECTION, SOLUTION INTRAMUSCULAR; INTRAVENOUS at 08:49

## 2019-11-05 RX ADMIN — SODIUM CHLORIDE, POTASSIUM CHLORIDE, SODIUM LACTATE AND CALCIUM CHLORIDE: 600; 310; 30; 20 INJECTION, SOLUTION INTRAVENOUS at 08:06

## 2019-11-05 RX ADMIN — DEXAMETHASONE SODIUM PHOSPHATE 4 MG: 4 INJECTION, SOLUTION INTRA-ARTICULAR; INTRALESIONAL; INTRAMUSCULAR; INTRAVENOUS; SOFT TISSUE at 08:18

## 2019-11-05 RX ADMIN — FENTANYL CITRATE 50 MCG: 50 INJECTION, SOLUTION INTRAMUSCULAR; INTRAVENOUS at 08:06

## 2019-11-05 RX ADMIN — Medication 5 MG: at 08:20

## 2019-11-05 RX ADMIN — PHENYLEPHRINE HYDROCHLORIDE 100 MCG: 10 INJECTION INTRAVENOUS at 08:24

## 2019-11-05 RX ADMIN — Medication 5 MG: at 08:22

## 2019-11-05 RX ADMIN — PHENYLEPHRINE HYDROCHLORIDE 50 MCG: 10 INJECTION INTRAVENOUS at 08:20

## 2019-11-05 RX ADMIN — ONDANSETRON 4 MG: 2 INJECTION INTRAMUSCULAR; INTRAVENOUS at 09:10

## 2019-11-05 ASSESSMENT — MIFFLIN-ST. JEOR: SCORE: 1505.5

## 2019-11-05 ASSESSMENT — LIFESTYLE VARIABLES: TOBACCO_USE: 0

## 2019-11-05 ASSESSMENT — ENCOUNTER SYMPTOMS
SEIZURES: 1
DYSRHYTHMIAS: 1

## 2019-11-05 NOTE — OR NURSING
Called Shanqiue Ponce LPN, report given to her. Talked with Mary Director of nursing and notified her that patient states that staff rough getting him dressed.

## 2019-11-05 NOTE — DISCHARGE INSTRUCTIONS
Same Day Surgery Discharge Instructions for  Sedation and General Anesthesia       It's not unusual to feel dizzy, light-headed or faint for up to 24 hours after surgery or while taking pain medication.  If you have these symptoms: sit for a few minutes before standing and have someone assist you when you get up to walk or use the bathroom.      You should rest and relax for the next 24 hours. We recommend you make arrangements to have an adult stay with you for at least 24 hours after your discharge.  Avoid hazardous and strenuous activity.      DO NOT DRIVE any vehicle or operate mechanical equipment for 24 hours following the end of your surgery.  Even though you may feel normal, your reactions may be affected by the medication you have received.      Do not drink alcoholic beverages for 24 hours following surgery.       Slowly progress to your regular diet as you feel able. It's not unusual to feel nauseated and/or vomit after receiving anesthesia.  If you develop these symptoms, drink clear liquids (apple juice, ginger ale, broth, 7-up, etc. ) until you feel better.  If your nausea and vomiting persists for 24 hours, please notify your surgeon.        All narcotic pain medications, along with inactivity and anesthesia, can cause constipation. Drinking plenty of liquids and increasing fiber intake will help.      For any questions of a medical nature, call your surgeon.      Do not make important decisions for 24 hours.      If you had general anesthesia, you may have a sore throat for a couple of days related to the breathing tube used during surgery.  You may use Cepacol lozenges to help with this discomfort.  If it worsens or if you develop a fever, contact your surgeon.       If you feel your pain is not well managed with the pain medications prescribed by your surgeon, please contact your surgeon's office to let them know so they can address your concerns.     Cystoscopy and  Holmium Laser Discharge  Instructions    Holmium laser lithotripsy was used to break up your kidney stone(s). It is normal to have visible blood in the urine, burning, urgency and frequency following this procedure. These symptoms may last for a few days to weeks.     Diet:    To help pass stone fragments and clear the blood out of the urine, it is important to drink 6-8 glasses of fluids per day at home - at least 3-4 glasses should be water.       Return to the diet that you were on before the procedure, unless you are given specific diet instructions.    Activity:    Walk short distances and increase as your strength allows.    You may climb stairs.    Do not do strenuous exercise or heavy lifting until approved by surgeon.    Do not drive while taking narcotic pain medications.    Bathing:    You may take a shower.           Call your physician if these signs/symptoms are present:    Pain that is not relieved by a short rest or ordered pain medications.    Temperature at or above 101.0 F or chills.    Inability or difficulty urinating.     Excessive blood in urine.    Any questions or concerns.    STENT INFORMATION SHEET  [facility], PSILVANO Burton M.D.,  JAMISON Morales M.D.    REY Mcgee M.D., SWATI Perez M.D.  [phone]    During surgery, a stent may be place in the ureter.  The ureter is the tube that drains urine from the kidney to the bladder.  The stent is placed to dilate (open) the ureter so the stone fragments can pass easily through the ureter or to decrease ureteral swelling after surgery, or to relieve an obstruction.    The stent is made of rubber.  The upper end of the stent curls in the kidney while the lower end rests in the bladder.    While the stent is in place you may experience the following symptoms:    Blood and/or small blood clots in urine.    Bladder spasm (frequency and urgency of urination).    Discomfort or aching in the back or side where the stent is.    Burning or discomfort at the end of  urine stream.    To decrease these symptoms you should:    Take pain medication as prescribed.    Drink plenty of fluids.    If you experience pain at the end of urination try not emptying your bladder completely.    If having discomfort in back or side, decrease activity.    Please call your physician or the physician on call if you experience:    Fever greater than 101 .    Severe pain not relieved by pain medication or rest.      Please make an appointment for removal of the stent according to your physician s instructions.      **If you have questions or concerns about your procedure,   call Dr. Morales at [phone]**

## 2019-11-05 NOTE — ANESTHESIA PREPROCEDURE EVALUATION
Anesthesia Pre-Procedure Evaluation    Patient: Chance Shrestha   MRN: [id number] : 1940          Preoperative Diagnosis: Left ureteral stone [N20.1]    Procedure(s):  CYSTOSCOPY, LEFT URETEROSCOPY HOLMIUM LASER AND LEFT STENT PLACEMENT    Past Medical History:   Diagnosis Date     Constipation      Depression      DM type 2 (diabetes mellitus, type 2) (H)      GERD (gastroesophageal reflux disease)      Hernia, abdominal      HLD (hyperlipidemia)      HTN (hypertension)      Hypothyroid      Ischemic heart disease      Mumps     as child      Seizures (H)      Systolic CHF (H)      Past Surgical History:   Procedure Laterality Date     HERNIA REPAIR       IR LOWER EXTREMITY ANGIOGRAM BILATERAL  2019     ORTHOPEDIC SURGERY Right     Hip replacement       Anesthesia Evaluation     . Pt has had prior anesthetic.     No history of anesthetic complications          ROS/MED HX    ENT/Pulmonary:      (-) tobacco use, asthma and sleep apnea   Neurologic:     (+)seizures last seizure: over 15 years CVA date: over 15 years with deficits- L weakness, vertigo,     Cardiovascular: Comment: 1. Left lower extremity: Single vessel runoff via the peroneal artery.  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  2. Right lower extremity: Single vessel runoff via the peroneal  artery.     (+) hypertension-Peripheral Vascular Disease-CAD, --stent,. Taking blood thinners : . CHF Last EF: 60% . . :. dysrhythmias a-fib, .       METS/Exercise Tolerance:     Hematologic:         Musculoskeletal:         GI/Hepatic:     (+) GERD Asymptomatic on medication, Other GI/Hepatic H/O GI bleed      Renal/Genitourinary:     (+) chronic renal disease, type: CRI, Nephrolithiasis ,       Endo:     (+) type II DM thyroid problem hypothyroidism, .      Psychiatric:     (+) psychiatric history depression      Infectious Disease:         Malignancy:         Other:                          Physical  "Exam  Normal systems: pulmonary    Airway   Mallampati: II  TM distance: >3 FB  Neck ROM: full    Dental   (+) chipped    Cardiovascular   Rhythm and rate: regular      Pulmonary             Lab Results   Component Value Date    WBC 9.4 09/17/2019    HGB 12.5 (L) 09/17/2019    HCT 38.0 (L) 09/17/2019     09/17/2019     09/17/2019    POTASSIUM 4.1 09/17/2019    CHLORIDE 102 09/17/2019    CO2 25 09/17/2019    BUN 26 09/17/2019    CR 1.10 09/17/2019     (H) 09/17/2019    NITISH 9.6 09/17/2019    PTT 42 (H) 07/17/2019    INR 2.82 (H) 07/30/2019       Preop Vitals  BP Readings from Last 3 Encounters:   10/30/19 138/66   10/21/19 127/70   10/10/19 108/76    Pulse Readings from Last 3 Encounters:   10/30/19 64   10/21/19 59   10/10/19 67      Resp Readings from Last 3 Encounters:   10/21/19 18   10/07/19 18   10/05/19 15    SpO2 Readings from Last 3 Encounters:   10/10/19 93%   10/07/19 94%   10/05/19 93%      Temp Readings from Last 1 Encounters:   10/21/19 35.8  C (96.5  F) (Temporal)    Ht Readings from Last 1 Encounters:   10/30/19 1.854 m (6' 1\")      Wt Readings from Last 1 Encounters:   10/30/19 83.9 kg (185 lb)    Estimated body mass index is 24.41 kg/m  as calculated from the following:    Height as of 10/30/19: 1.854 m (6' 1\").    Weight as of 10/30/19: 83.9 kg (185 lb).       Anesthesia Plan      History & Physical Review  History and physical reviewed and following examination; no interval change.    ASA Status:  3 .    NPO Status:  > 8 hours    Plan for General and LMA with Intravenous and Propofol induction. Maintenance will be Balanced.    PONV prophylaxis:  Ondansetron (or other 5HT-3)       Postoperative Care  Postoperative pain management:  Multi-modal analgesia.      Consents  Anesthetic plan, risks, benefits and alternatives discussed with:  Patient..                 Jennifer Clemons MD  "
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
## 2019-11-05 NOTE — BRIEF OP NOTE
[facility] Brief Operative Note    Pre-operative diagnosis: Left ureteral stone [N20.1]   Post-operative diagnosis * No post-op diagnosis entered *  same   Procedure: Procedure(s):  CYSTOSCOPY, LEFT RETROGRADE, LEFT URETEROSCOPY HOLMIUM LASER AND LEFT URETERAL STENT PLACEMENT    Surgeon(s): Surgeon(s) and Role:     * Donnie Morales MD - Primary   Estimated blood loss: * No values recorded between 11/5/2019  8:36 AM and 11/5/2019  9:39 AM *   Specimens: ID Type Source Tests Collected by Time Destination   1 : LEFT URETERAL STONE Calculus/Stone Ureter, Left STONE ANALYSIS Donnie Morales MD 11/5/2019  9:23 AM       Findings: Stone sent

## 2019-11-05 NOTE — ANESTHESIA CARE TRANSFER NOTE
Patient: Chance Sherstha    Procedure(s):  CYSTOSCOPY, LEFT RETROGRADE, LEFT URETEROSCOPY HOLMIUM LASER AND LEFT URETERAL STENT PLACEMENT    Diagnosis: Left ureteral stone [N20.1]  Diagnosis Additional Information: No value filed.    Anesthesia Type:   General     Note:  Airway :Face Mask  Patient transferred to:PACU  Handoff Report: Identifed the Patient, Identified the Reponsible Provider, Reviewed the pertinent medical history, Discussed the surgical course, Reviewed Intra-OP anesthesia mangement and issues during anesthesia, Set expectations for post-procedure period and Allowed opportunity for questions and acknowledgement of understanding      Vitals: (Last set prior to Anesthesia Care Transfer)    CRNA VITALS  11/5/2019 0908 - 11/5/2019 0947      11/5/2019             SpO2:  100 %    Resp Rate (observed): 8    Resp Rate (set):  10                Electronically Signed By: DURGA Cortez CRNA  November 5, 2019  9:47 AM

## 2019-11-05 NOTE — ANESTHESIA POSTPROCEDURE EVALUATION
Patient: Chance Shrestha    Procedure(s):  CYSTOSCOPY, LEFT RETROGRADE, LEFT URETEROSCOPY HOLMIUM LASER AND LEFT URETERAL STENT PLACEMENT    Diagnosis:Left ureteral stone [N20.1]  Diagnosis Additional Information: No value filed.    Anesthesia Type:  General    Note:  Anesthesia Post Evaluation    Patient location during evaluation: PACU  Patient participation: Able to fully participate in evaluation  Level of consciousness: awake and alert  Pain management: adequate  Airway patency: patent  Cardiovascular status: acceptable  Respiratory status: acceptable and unassisted  Hydration status: acceptable  PONV: none             Last vitals:  Vitals:    11/05/19 1000 11/05/19 1015 11/05/19 1024   BP: (!) 147/81 (!) 149/94    Pulse: 62 63    Resp: 13 14 11   Temp: 36.9  C (98.4  F) 36.8  C (98.3  F)    SpO2: 98% 93% 96%         Electronically Signed By: Jennifer Clemons MD  November 5, 2019  10:42 AM

## 2019-11-05 NOTE — PROGRESS NOTES
Admission medication history interview status for the 11/5/2019  admission is complete. See EPIC admission navigator for prior to admission medications     Medication history source reliability:Moderate    Medication history interview source(s):NURSING HOME    Medication history resources (including written lists, pill bottles, clinic record):MAR    [facility]    Additional medication history information not noted on PTA med list :None    Time spent in this activity: 55 MINUTES    Prior to Admission medications    Medication Sig Last Dose Taking? Auth Provider   acetaminophen (TYLENOL) 325 MG tablet Take 650 mg by mouth every 6 hours as needed for mild pain (325MG X 2 = 650MG) MORE THAN A WEEK at PRN Yes Reported, Patient   amLODIPine (NORVASC) 10 MG tablet Take 10 mg by mouth daily  11/4/2019 at 0730 Yes Reported, Patient   atorvastatin (LIPITOR) 80 MG tablet Take 80 mg by mouth daily  11/4/2019 at 0730 Yes Reported, Patient   cholecalciferol (VITAMIN D3) 125 MCG (5000 UT) TABS tablet Take 5,000 Units by mouth daily  11/4/2019 at 0800 Yes Reported, Patient   ezetimibe (ZETIA) 10 MG tablet Take 1 tablet (10 mg) by mouth daily 11/4/2019 at 0730 Yes Adrienne Willis PA-C   fluticasone (FLONASE) 50 MCG/ACT nasal spray Spray 1 spray into both nostrils daily 11/4/2019 at 0730 Yes Reported, Patient   glimepiride (AMARYL) 1 MG tablet Take 1 tablet (1 mg) by mouth every morning (before breakfast)  Patient taking differently: Take 2 mg by mouth every morning (before breakfast) (1MG X 2 = 2MG) 11/4/2019 at 0730 Yes Adrienne Willis PA-C   levothyroxine (SYNTHROID/LEVOTHROID) 25 MCG tablet Take 25 mcg by mouth daily  11/4/2019 at 0730 Yes Reported, Patient   lisinopril (PRINIVIL/ZESTRIL) 10 MG tablet Take 10 mg by mouth daily  11/4/2019 at 0730 Yes Reported, Patient   metFORMIN (GLUCOPHAGE) 1000 MG tablet Take 1,000 mg by mouth 2 times daily (with meals)  11/4/2019 at 1930 Yes Reported, Patient    metoprolol tartrate (LOPRESSOR) 100 MG tablet Take 100 mg by mouth 2 times daily  11/4/2019 at 1930 Yes Reported, Patient   mirtazapine (REMERON) 15 MG tablet Take 15 mg by mouth At Bedtime 11/4/2019 at 2030 Yes Unknown, Entered By History   Multiple Vitamin (DAILY VITES) TABS Take 1 tablet by mouth daily  11/4/2019 at 0800 Yes Reported, Patient   ondansetron (ZOFRAN-ODT) 4 MG ODT tab Take 1 tablet (4 mg) by mouth every 6 hours as needed for nausea  Patient taking differently: Take 4 mg by mouth every 8 hours as needed for nausea  MORE THAN A WEEK at PRN Yes Ernie Carrillo MD   polyethylene glycol (MIRALAX/GLYCOLAX) powder Take 17 g by mouth daily as needed  MORE THAN A WEEK at PRN Yes Reported, Patient   ranitidine (ZANTAC) 75 MG tablet Take 75 mg by mouth 2 times daily 11/4/2019 at 2000 Yes Reported, Patient   risperiDONE (RISPERDAL) 1 MG tablet Take 1 mg by mouth every evening 11/4/2019 at 1800 Yes Reported, Patient   traMADol (ULTRAM) 50 MG tablet Take 50 mg by mouth every 6 hours as needed for severe pain MORE THAN A WEEK at PRN Yes Reported, Patient   venlafaxine (EFFEXOR-XR) 37.5 MG 24 hr capsule Take 37.5 mg by mouth every evening With venlafaxine ER 75 mg  11/4/2019 at 1900 Yes Unknown, Entered By History   venlafaxine (EFFEXOR-XR) 75 MG 24 hr capsule Take 75 mg by mouth every evening With venlafaxine er 37.5 mg  11/4/2019 at 1900 Yes Unknown, Entered By History   warfarin (COUMADIN) 7.5 MG tablet Take 7.5 mg by mouth daily ON Tuesday, Thursday, Friday, Saturday, AND Sunday. 10/31/2019 at 1900 Yes Reported, Patient   warfarin ANTICOAGULANT (COUMADIN) 5 MG tablet Take 5 mg by mouth daily ON Monday AND Wednesday. 10/30/2019 at 1900 Yes Reported, Patient   enoxaparin (LOVENOX) 80 MG/0.8ML syringe INJECT 0.8 MLS (80 MG) SUBCUTANEOUS 2 TIMES DAILY   Alban Jones MD   order for [facility]  [place]:Phone: [phone] Fax: [phone]  [place]: Phone [phone] Fax  [phone]  Please call Patient to schedule  Evaluate and Treat as indicated   Left Foot Modification to AFO or new AFO, non healing ulcer of left great toe and rubbing on left ankle   Anali Parr PA-C
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
## 2019-11-05 NOTE — OP NOTE
Procedure Date: 11/05/2019      PREOPERATIVE DIAGNOSIS:  Left upper ureteral stone.      POSTOPERATIVE DIAGNOSIS:  Left upper ureteral stone.      PROCEDURE:  Cystoscopy, left retrograde pyelogram, left flexible and rigid ureteroscopy, holmium laser lithotripsy of stone, basketing of fragments and insertion of stent with fluoroscopic interpretation of images.      SURGEON:  Donnie Morales MD      ANESTHESIA:  General.      INDICATIONS:  This is a very pleasant 79-year-old gentleman who presented with severe left-sided flank pain as a result of a 6 mm stone in the left upper ureter.  The pain has subsided, but the stone has not moved and we had followed this carefully for a time to see if it would migrate distally.  It did maintain its same position and ultimately we decided we would proceed with treatment of the stone surgically.      DESCRIPTION OF PROCEDURE:  The patient was brought to the operative suite and after induction of anesthesia, was placed in the dorsal lithotomy position with the genitalia prepped and draped in customary fashion.      A timeout was then called.      The 21 Scottish cystoscope was passed into the penile urethra.  The penile urethra is normal.  The external sphincter was intact.  When viewed from the verumontanum, there was mild enlargement of the lateral lobes of the prostate.  The bladder was entered without difficulty.      The interior of the bladder was carefully inspected.  No neoplasm or stone could be seen in the bladder.  Grade I-II trabeculation was observed.  There were no other features in the bladder.  After identifying the left ureteric orifice, I passed a 6 Scottish open-ended catheter into the distal ureter and performed a left retrograde pyelogram.  That showed a slightly dilated ureter with a filling defect seen in the upper ureter and only very mild dilatation of the collecting system.  I then passed the 0.035 Sensor wire through the open-ended catheter up into the ureter.   The wire was passed into the upper pole ar.  I then removed the open-ended catheter and the cystoscope, secured the wire in the usual fashion and then passed a 6.9 semirigid ureteroscope through the urethra into the bladder and negotiated it carefully into the left distal ureter.  I then was able to pass it gently up the ureter with the aid of a second guidewire to get over the pelvic brim and inserted it all the way up the ureter into the kidney.  I did not see a stone in the ureter.  I suspected that the stone may have migrated back into the kidney.  I could only see the upper pole ar with the rigid cystoscope, so I withdrew the rigid ureteroscope and then passed the high-definition flexible ureteroscope over a guidewire alongside the other guidewire and then was able to pass this gently into the ureter without having to dilate the ureter and all the way up into the renal pelvis.  I removed the wire through the flexible ureteroscope and then carefully inspected the calices.  The stone was immediately seen in the upper pole ar.  I decided to try and engage the stone in the basket from the upper pole ar, with which I was successful with a 1.90 tip basket and catia the stone down into the upper ureter.  It would not pass beyond this point, so I was able to detach the basket from its controlling handle, leave the basket in place, withdraw the flexible ureteroscope and then substituted that once again for the rigid ureteroscope, which was passed through the urethra into the bladder and up the ureter until I could see the stone.  It was clear that I would need to engage the stone in the basket to hold it in place while I broke it up, so I had to remove the old basket and then passed a new basket through this ureteroscope beyond the stone, opened the basket and engaged the stone in the basket.  I was then able to pass the 365-micron holmium laser fiber alongside the basket and at a setting of 1 joule at 10 per  second fragmented it into small pieces within the basket.  After withdrawing the holmium laser fiber, I then basketed the fragments piecemeal, some of which would be sent for analysis and some were deposited in the bladder.  Finally, I was able to look through the whole length of the ureter and up into the kidney and there were no significant fragments remaining.        I therefore withdrew the ureteroscope and passed a 6 Mexican 28 cm double-J stent over the wire until 1 end was in the upper ar, the other was in the bladder.  I drained the bladder with the cystoscope sheath and the patient was taken to the recovery room, having tolerated the procedure well.      CONCLUSION:  I will leave the stent in for about a week or so, then see the patient in the office to remove the stent, talk about stone analysis and discuss other measures to try and prevent further stones forming in the future.         LINDA DOTY MD             D: 2019   T: 2019   MT: CLIFF      Name:     SOCO NINO   MRN:      [id number]        Account:        [id number]   :      1940           Procedure Date: 2019      Document: [id number]

## 2019-11-06 LAB — COPATH REPORT: NORMAL

## 2019-11-09 LAB
APPEARANCE STONE: NORMAL
COMPN STONE: NORMAL
NUMBER STONE: 4
SIZE STONE: NORMAL MM
WT STONE: 75 MG

## 2019-11-13 ENCOUNTER — RECORDS - HEALTHEAST (OUTPATIENT)
Dept: LAB | Facility: CLINIC | Age: 79
End: 2019-11-13

## 2019-11-13 LAB — INR PPP: 1.42 (ref 0.9–1.1)

## 2019-11-20 ENCOUNTER — RECORDS - HEALTHEAST (OUTPATIENT)
Dept: LAB | Facility: CLINIC | Age: 79
End: 2019-11-20

## 2019-11-20 LAB — INR PPP: 2.99 (ref 0.9–1.1)

## 2019-11-25 ENCOUNTER — OFFICE VISIT (OUTPATIENT)
Dept: UROLOGY | Facility: CLINIC | Age: 79
End: 2019-11-25
Payer: MEDICARE

## 2019-11-25 VITALS
WEIGHT: 185 LBS | HEART RATE: 61 BPM | BODY MASS INDEX: 24.52 KG/M2 | SYSTOLIC BLOOD PRESSURE: 140 MMHG | DIASTOLIC BLOOD PRESSURE: 70 MMHG | OXYGEN SATURATION: 94 % | HEIGHT: 73 IN

## 2019-11-25 DIAGNOSIS — N20.0 KIDNEY STONE: ICD-10-CM

## 2019-11-25 DIAGNOSIS — Z79.2 PROPHYLACTIC ANTIBIOTIC: Primary | ICD-10-CM

## 2019-11-25 PROCEDURE — 52310 CYSTOSCOPY AND TREATMENT: CPT | Performed by: UROLOGY

## 2019-11-25 RX ORDER — LIDOCAINE HYDROCHLORIDE 20 MG/ML
JELLY TOPICAL ONCE
Status: COMPLETED | OUTPATIENT
Start: 2019-11-25 | End: 2019-11-25

## 2019-11-25 RX ORDER — CIPROFLOXACIN 500 MG/1
500 TABLET, FILM COATED ORAL ONCE
Qty: 1 TABLET | Refills: 0 | Status: SHIPPED | OUTPATIENT
Start: 2019-11-25 | End: 2019-11-25

## 2019-11-25 RX ADMIN — LIDOCAINE HYDROCHLORIDE: 20 JELLY TOPICAL at 14:00

## 2019-11-25 ASSESSMENT — MIFFLIN-ST. JEOR: SCORE: 1598.03

## 2019-11-25 ASSESSMENT — PAIN SCALES - GENERAL: PAINLEVEL: NO PAIN (0)

## 2019-11-25 NOTE — NURSING NOTE
Chief Complaint   Patient presents with     Cystoscopy     patient is here for stent removal.      Prior to the start of the procedure and with procedural staff participation, I verbally confirmed the patient s identity using two indicators, relevant allergies, that the procedure was appropriate and matched the consent or emergent situation, and that the correct equipment/implants were available. Immediately prior to starting the procedure I conducted the Time Out with the procedural staff and re-confirmed the patient s name, procedure, and site/side. I have wiped the patient off with the povidone-Iodine solution, draped them,  used Lidocaine hydrochloride jelly, and instilled sterile water into the bladder. (The Joint Commission universal protocol was followed.)  Yes    Sedation (Moderate or Deep): None    5mL 2% lidocaine hydrochloride Urojet instilled into urethra.    NDC# [address]-13  Lot #: [id number]  Expiration Date:  7/2021    Steffi Solano CMA

## 2019-11-25 NOTE — PATIENT INSTRUCTIONS
"AFTER YOUR CYSTOSCOPY  ?  ?  You have just completed a cystoscopy, or \"cysto\", which allowed your physician to learn more about your bladder (or to remove a stent placed after surgery). We suggest that you continue to avoid caffeine, fruit juice, and alcohol for the next 24 hours, however, you are encouraged to return to your normal activities.  ?  ?  A few things that are considered normal after your cystoscopy:  ?  * small amount of bleeding (or spotting) that clears within the next 24 hours  ?  * slight burning sensation with urination  ?  * sensation of needing to void (urinate) more frequently  ?  * the feeling of \"air\" in your urine  ?  * mild discomfort that is relieved with Tylenol    * bladder spasms  ?  ?  ?  Please contact our office promptly if you:  ?  * develop a fever above 101 degrees  ?  * are unable to urinate  ?  * develop bright red blood that does not stop  ?  * experience severe pain or swelling  ?  ?  ?  And of course, please contact our office with any concerns or questions [phone]  ?    AFTER YOUR CYSTOSCOPY        You have just completed a cystoscopy, or \"cysto\", which allowed your physician to learn more about your bladder (or to remove a stent placed after surgery). We suggest that you continue to avoid caffeine, fruit juice, and alcohol for the next 24 hours, however, you are encouraged to return to your normal activities.         A few things that are considered normal after your cystoscopy:     * Small amount of bleeding (or spotting) that clears within the next 24 hours     * Slight burning sensation with urination     * Sensation to of needing to avoid more frequently     * The feeling of \"air\" in your urine     * Mild discomfort that is relieved with Tylenol        Please contact our office promptly if you:     * Develop a fever above 101 degrees     * Are unable to urinate     * Develop bright red blood that does not stop     * Severe pain or swelling         Please contact " "our office with any concerns or questions @DEPTPHN.  AFTER YOUR CYSTOSCOPY        You have just completed a cystoscopy, or \"cysto\", which allowed your physician to learn more about your bladder (or to remove a stent placed after surgery). We suggest that you continue to avoid caffeine, fruit juice, and alcohol for the next 24 hours, however, you are encouraged to return to your normal activities.         A few things that are considered normal after your cystoscopy:     * Small amount of bleeding (or spotting) that clears within the next 24 hours     * Slight burning sensation with urination     * Sensation to of needing to avoid more frequently     * The feeling of \"air\" in your urine     * Mild discomfort that is relieved with Tylenol        Please contact our office promptly if you:     * Develop a fever above 101 degrees     * Are unable to urinate     * Develop bright red blood that does not stop     * Severe pain or swelling         Please contact our office with any concerns or questions @DEPTPHN.  "

## 2019-11-25 NOTE — LETTER
11/25/2019       RE: Chance Shrestha  [address]  [place] [address]     Dear Colleague,    Thank you for referring your patient, Chance Shrestha, to the [facility] at [facility]. Please see a copy of my visit note below.    This is a very pleasant 79-year-old gentleman, returns today for removal of a stent.  He had presented about 2 weeks ago with severe left-sided flank pain as a result of a 6 mm stone in the left upper ureter.  This pain that she did subside but the stone did not migrate distally and we decided therefore to proceed proceed with intervention.  I performed a left flexible and rigid ureteroscopy with holmium laser lithotripsy and basket of fragments and placed a stent.  He returns today for removal of the stent.  Stone analysis is as follows.  (Note)   Calculi composed primarily of   calcium oxalate monohydrate.   INTERPRETIVE INFORMATION: Calculi (Stone) analysis   Calculi are the products of physiological processes that   yield crystalline compounds in a matrix of biological   compounds and blood.  Matrix components are not reported.     The clinically significant crystalline components   identified in calculi specimens are reported.  Gross   description may not be consistent with composition   determined by FTIR analysis.   Performed by [facility],   [address] [place] [id number] [phone]   [email or web address], Robin Davison MD, Lab. Director      Procedure.  Cystoscopy with right sided stent removal   Surgeon.    [place]  Anesthesia.  Local anesthesia.  Description.  With the patient in the supine position, with the genital area prepped and draped in the customary fashion, with local anesthetic and the urethra, the flexible cystoscope was Inserted.  The penile urethra is normal.  The external sphincter is intact.  Mild enlargement of the lateral lobes of prostate observed.  Anterior bladder  inspected,  Stent was seen, grasped, and removed without difficulty.  There were no other remarkable features.    Impression.  I did have a scheduled discussion with him today about prevention of stone stone as we noted above list a calcium oxalate monohydrate stone.  He is 79 years of age and I do not think we need to embark on formal metabolic stone evaluation.  I have given him some advice about hydration, reducing sodium in the diet, increasing citrate and magnesium in the diet and eating red meat in moderation beyond this I do not think we need to do any other measures at this time unless further stones forming the future.  I did go over the entire situation carefully with him in detail today.  I answered all his questions.    Plan.  I will see him on an as-needed basis        Again, thank you for allowing me to participate in the care of your patient.      Sincerely,    Donnie Morales MD

## 2019-11-27 NOTE — PROGRESS NOTES
This is a very pleasant 79-year-old gentleman, returns today for removal of a stent.  He had presented about 2 weeks ago with severe left-sided flank pain as a result of a 6 mm stone in the left upper ureter.  This pain that she did subside but the stone did not migrate distally and we decided therefore to proceed proceed with intervention.  I performed a left flexible and rigid ureteroscopy with holmium laser lithotripsy and basket of fragments and placed a stent.  He returns today for removal of the stent.  Stone analysis is as follows.  (Note)   Calculi composed primarily of   calcium oxalate monohydrate.   INTERPRETIVE INFORMATION: Calculi (Stone) analysis   Calculi are the products of physiological processes that   yield crystalline compounds in a matrix of biological   compounds and blood.  Matrix components are not reported.     The clinically significant crystalline components   identified in calculi specimens are reported.  Gross   description may not be consistent with composition   determined by FTIR analysis.   Performed by [facility],   [address] [place] [id number] [phone]   [email or web address], Robin Davison MD, Lab. Director      Procedure.  Cystoscopy with right sided stent removal   Surgeon.    [place]  Anesthesia.  Local anesthesia.  Description.  With the patient in the supine position, with the genital area prepped and draped in the customary fashion, with local anesthetic and the urethra, the flexible cystoscope was Inserted.  The penile urethra is normal.  The external sphincter is intact.  Mild enlargement of the lateral lobes of prostate observed.  Anterior bladder inspected,  Stent was seen, grasped, and removed without difficulty.  There were no other remarkable features.    Impression.  I did have a scheduled discussion with him today about prevention of stone stone as we noted above list a calcium oxalate monohydrate stone.  He is 79 years of age and I do not think we need to  embark on formal metabolic stone evaluation.  I have given him some advice about hydration, reducing sodium in the diet, increasing citrate and magnesium in the diet and eating red meat in moderation beyond this I do not think we need to do any other measures at this time unless further stones forming the future.  I did go over the entire situation carefully with him in detail today.  I answered all his questions.    Plan.  I will see him on an as-needed basis

## 2019-11-29 ENCOUNTER — RECORDS - HEALTHEAST (OUTPATIENT)
Dept: LAB | Facility: CLINIC | Age: 79
End: 2019-11-29

## 2019-11-29 LAB — INR PPP: 2.43 (ref 0.9–1.1)

## 2019-12-02 ENCOUNTER — HOSPITAL ENCOUNTER (OUTPATIENT)
Dept: WOUND CARE | Facility: CLINIC | Age: 79
Discharge: HOME | End: 2019-12-02
Attending: SURGERY | Admitting: SURGERY
Payer: MEDICARE

## 2019-12-02 VITALS
RESPIRATION RATE: 16 BRPM | TEMPERATURE: 96.9 F | HEART RATE: 61 BPM | DIASTOLIC BLOOD PRESSURE: 86 MMHG | SYSTOLIC BLOOD PRESSURE: 167 MMHG

## 2019-12-02 DIAGNOSIS — I73.9 PAD (PERIPHERAL ARTERY DISEASE) (H): ICD-10-CM

## 2019-12-02 DIAGNOSIS — L97.522 SKIN ULCER OF TOE OF LEFT FOOT WITH FAT LAYER EXPOSED (H): ICD-10-CM

## 2019-12-02 PROCEDURE — 93923 UPR/LXTR ART STDY 3+ LVLS: CPT

## 2019-12-02 PROCEDURE — 99213 OFFICE O/P EST LOW 20 MIN: CPT | Performed by: SURGERY

## 2019-12-02 PROCEDURE — G0463 HOSPITAL OUTPT CLINIC VISIT: HCPCS

## 2019-12-02 PROCEDURE — A6248 HYDROGEL DRSG GEL FILLER: HCPCS

## 2019-12-02 RX ORDER — GLIMEPIRIDE 2 MG/1
TABLET ORAL
COMMUNITY

## 2019-12-02 RX ORDER — KETOROLAC TROMETHAMINE 10 MG/1
TABLET, FILM COATED ORAL
COMMUNITY

## 2019-12-02 RX ORDER — WARFARIN SODIUM 7.5 MG/1
TABLET ORAL
COMMUNITY

## 2019-12-02 RX ORDER — POLYETHYLENE GLYCOL 3350
POWDER (GRAM) MISCELLANEOUS
COMMUNITY

## 2019-12-02 RX ORDER — FLUTICASONE PROPIONATE 50 MCG
SPRAY, SUSPENSION (ML) NASAL
COMMUNITY

## 2019-12-02 NOTE — PROGRESS NOTES
CWCN time spent educating patient and caregiver on care of healed wound. ZAINAB KlineN, RN-BC, CWCN

## 2019-12-02 NOTE — DISCHARGE INSTRUCTIONS
[facility]  [address], [place] [address]  Appointment Phone [phone]     Chance Shrestha 1940    [facility] Phone [phone] Fax [phone]    Wound Dressing Change to left plantar toe unroofed blister  Cleanse wound and surrounding skin with wound cleanser  Cover wound with Woun'dres gel and Bandid  Change dressing Monday, Wednesday and Friday until healed.    You should never go barefoot in the house, you must always wear socks. This is how you got the blister on your toe    Alban Jones M.D. December 2, 2019    Call us at [phone] if you have any questions about your wounds, have redness or  swelling around your wound, have a fever of 101 or greater or if you have any other  problems or concerns. We answer the phone Monday through Friday 8 am to 4 pm,  please leave a message as we check the voicemail frequently throughout the day.    Follow up with Dr. Jones as needed

## 2019-12-02 NOTE — PROGRESS NOTES
[facility] [place] Progress Note    Subject: Chance Shrestha Returns for follow-up of the bilateral foot ulcerations.  Significant PAD.  Angiography revealed bilateral trifurcation disease performed on 7/17/2019.  On the left leg anterior tibial and posterior tibials were occluded.  Peroneal artery was only runoff and multiple high-grade stenoses were angioplastied.  This gives collateral flow to the dorsalis pedis artery at the ankle.  Similar findings noted in the right with peroneal runoff that did not require angioplasty.    Subsequently healed up the right foot ulcers.    On his last visit on 10/21/2018 he had a left lateral plantar toe ulcer.  This ulcer is subtotally healed completely.  However, approximately a week ago he was walking barefoot in his home and slipped on the floor causing a blister and slight skin tear over the left great toe site where it had the previous ulcer.  They have been applying Iodosorb to the area.    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)     PVD (peripheral vascular disease) (H)     SAH (subarachnoid hemorrhage) (H)     Status post amputation of right great toe (H)     Tobacco use     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)     Uncomplicated alcohol abuse     Vertigo as late effect of stroke     Warfarin anticoagulation     Skin ulcer of toe  of left foot with fat layer exposed (H)     PAD (peripheral artery disease) (H)     Past Medical History:   Diagnosis Date     Constipation      Depression      DM type 2 (diabetes mellitus, type 2) (H)      GERD (gastroesophageal reflux disease)      Hernia, abdominal      HLD (hyperlipidemia)      HTN (hypertension)      Hypothyroid      Ischemic heart disease      Mumps     as child      Seizures (H)      Systolic CHF (H)      Exam:  BP (!) 167/86 (BP Location: Right arm)   Pulse 61   Temp 96.9  F (36.1  C) (Temporal)   Resp 16   Wound (used by OP [facility] only) 06/12/19 0935 Left plantar 1 toe ulceration, arterial (Active)   Length (cm) 1.2 12/2/2019 11:00 AM   Width (cm) 0.6 12/2/2019 11:00 AM   Depth (cm) 0 12/2/2019 11:00 AM   Wound (cm^2) 0.72 cm^2 12/2/2019 11:00 AM   Wound Volume (cm^3) 0 cm^3 12/2/2019 11:00 AM   Wound healing % 38.46 12/2/2019 11:00 AM   Dressing Appearance moist drainage 12/2/2019 11:00 AM   Drainage Characteristics/Odor serosanguineous 12/2/2019 11:00 AM   Drainage Amount moderate 12/2/2019 11:00 AM     Alert and appropriate.  Here with his wife.    He arrives in a wheelchair.  Right leg and foot look fine.  No recurrent ulcerations.  Over the left plantar medial great toe there is a blister noted.  Deeper epithelium appears to be intact and thus not an ulcer.  No swelling.  No cellulitis.    Bedside Doppler does reveal flow within the dorsalis pedis artery via the peroneal collaterals.      Procedure:   I did unroofed the blister with a 15 blade scalpel to the level where the superficial epithelium was intact.  Applied a small amount of Woun'Dres collagen gel and a Band-Aid to the area..    Impression: #1.  Ulcers have healed.  Skin blister as described above.  His wife will apply a small amount of Woun'Dres collagen gel and a protective Band-Aid until completely re-epithelialized.  Discussed the importance of never walking barefoot.  Should always wear knee-high socks.  This is also  important since he had several scabs on his tibial area where he stopped and to his wife's wheelchair edges.  I would recommend this even when he sleeping at night.  Also important to wear the shoes that he is wearing which are quite appropriate to protect his feet whenever possible.                            #2.  Peripheral artery disease.  Angioplasty left peroneal artery as described.  Does have adequate  Doppler flow in the dorsalis pedis artery via collaterals and has been able to heal his ulcers.  I do not feel specific duplex follow-up is indicated since the peroneal artery is quite small and would only intervene if he should develop recurrent ulcerations.  This is been discussed with his wife.    Plan: We will dress the wounds with above.      Patient will return to the clinic as needed    Alban Jones MD on 12/2/2019 at 11:14 AM

## 2019-12-05 ENCOUNTER — HOSPITAL ENCOUNTER (EMERGENCY)
Facility: CLINIC | Age: 79
Discharge: HOME | End: 2019-12-05
Attending: EMERGENCY MEDICINE | Admitting: EMERGENCY MEDICINE
Payer: MEDICARE

## 2019-12-05 ENCOUNTER — APPOINTMENT (OUTPATIENT)
Dept: GENERAL RADIOLOGY | Facility: CLINIC | Age: 79
End: 2019-12-05
Attending: EMERGENCY MEDICINE
Payer: MEDICARE

## 2019-12-05 VITALS
HEIGHT: 73 IN | BODY MASS INDEX: 24.52 KG/M2 | RESPIRATION RATE: 18 BRPM | SYSTOLIC BLOOD PRESSURE: 147 MMHG | DIASTOLIC BLOOD PRESSURE: 84 MMHG | TEMPERATURE: 98.4 F | OXYGEN SATURATION: 97 % | WEIGHT: 185 LBS | HEART RATE: 66 BPM

## 2019-12-05 DIAGNOSIS — S80.11XA CONTUSION OF MULTIPLE SITES OF RIGHT LOWER EXTREMITY, INITIAL ENCOUNTER: ICD-10-CM

## 2019-12-05 DIAGNOSIS — R79.1 ELEVATED INR: ICD-10-CM

## 2019-12-05 DIAGNOSIS — S80.811A ABRASION OF RIGHT LOWER EXTREMITY, INITIAL ENCOUNTER: ICD-10-CM

## 2019-12-05 LAB — INR PPP: 4.69 (ref 0.86–1.14)

## 2019-12-05 PROCEDURE — 73590 X-RAY EXAM OF LOWER LEG: CPT | Mod: RT

## 2019-12-05 PROCEDURE — 85610 PROTHROMBIN TIME: CPT | Performed by: EMERGENCY MEDICINE

## 2019-12-05 PROCEDURE — 99284 EMERGENCY DEPT VISIT MOD MDM: CPT

## 2019-12-05 ASSESSMENT — ENCOUNTER SYMPTOMS
NUMBNESS: 0
WOUND: 1
WEAKNESS: 0
FEVER: 0

## 2019-12-05 ASSESSMENT — MIFFLIN-ST. JEOR: SCORE: 1598.03

## 2019-12-05 NOTE — ED PROVIDER NOTES
"  History     Chief Complaint:  Leg Pain    HPI   Chance Shrestha is a 79 year old male anticoagulated on Coumadin with history of ischemic heart disease s/p artificial heart valve with double bypass, atrial fibrillation, hypertension, hyperlipidemia, type II diabetes on Metformin who presents with right leg pain. The patient's reports that he fell out of bed 2 days ago in his sleep. He sustained a wound to his right leg, with concerns for infection. No other reported injury. No reported fever.     Allergies:  Oxycodone  Codeine      Medications:    Norvasc  Lipitor  Lovenox  Zetia  Flonase  Amaryl  Synthroid  Lisinopril  Metformin  Metoprolol  Remeron  Effexor  Coumadin      Past Medical History:    Constipation  Depression  Type II DM  GERD  HLD  HTN  Hypothyroid  Ischemic heart disease  Seizures  Systolic CHF  Kidney stone  SAH  Atrial fibrillation  Cranial nerve VII palsy  Vertigo  PAD  Hemorrhagic stroke  Epilepsy  PVD  Kidney stones  CAD  Renal artery stenosis     Past Surgical History:    Lower extremity angiogram  Hip replacement  Foot surgery  Back surgery  Removal of kidney stone  Artificial heart valve with double bypass  CABG  Hernia repair  ESWL  Cholecystectomy  Neck surgery  PTCA x4  Lumbar laminectomy     Family History:    History reviewed. No pertinent family history.     Social History:  Smoking status: Never smoker  Alcohol use: No  Drug use: No  PCP: Ernie Shrestha  Presents to the ED with his spouse  Marital Status:        Review of Systems   Constitutional: Negative for fever.   Skin: Positive for wound.   Neurological: Negative for weakness and numbness.   All other systems reviewed and are negative.      Physical Exam     Patient Vitals for the past 24 hrs:   BP Temp Temp src Pulse Resp SpO2 Height Weight   12/05/19 1448 115/68 98.4  F (36.9  C) Oral 66 20 97 % 1.854 m (6' 1\") 83.9 kg (185 lb)       Physical Exam  Constitutional: Elderly male. Supine.  HENT: No signs of trauma. "   Eyes: EOM are normal. Pupils are equal, round, and reactive to light.   Neck: Normal range of motion. No JVD present. No cervical adenopathy.  Cardiovascular: Mechanical valve click heart at left sternal border. Regular rhythm.  Exam reveals no gallop and no friction rub. No murmur heard.  Pulmonary/Chest: Bilateral breath sounds normal. No wheezes, rhonchi or rales.  Abdominal: Soft. No tenderness. No rebound or guarding.   Musculoskeletal: Right leg 2+ femoral pulses. Partial amputation of great and 2nd toe. Abrasion over foot. Large abrasion 4 x 2 cm mid-dorsal leg. Mild swelling. No bony tenderness. Diminished sensation. No edema. No tenderness.   Lymphadenopathy: No lymphadenopathy.   Neurological: Alert and oriented to person, place, and time. Normal strength. Coordination normal.   Skin: Skin is warm and dry. No rash noted. No erythema.     Emergency Department Course   Imaging:  Radiographic findings were communicated with the patient who voiced understanding of the findings.    XR Tibia & Fibula Right 2 Views  No bony abnormality. Mild pretibial soft tissue  swelling/edema. Extensive vascular calcifications.  As read by Radiology.    Laboratory:   INR: 4.69     Emergency Department Course:  Past medical records, nursing notes, and vitals reviewed.  1511: I performed an exam of the patient and obtained history, as documented above.    The patient was sent for a right tibia and fibula x-ray while in the emergency department, findings above.    1605: I rechecked the patient. Explained findings to patient.    Findings and plan explained to the patient. Patient discharged home with instructions regarding supportive care, medications, and reasons to return. The importance of close follow-up was reviewed.     Impression & Plan    Medical Decision Making:  Chance Shrestha is an 79 year old male who was sent into the ED because of swelling and wound on his right leg after falling out of bed 2 days ago. He is on  Coumadin for mechanical valve. He denies any other injury. He has no numbness or weakness, which is new in that leg. On exam, there is an area of swelling in the mid-dorsal leg with overlying abrasion. Distal CMS is intact for the patient, which means that he has  sensitivity to light touch. There is no bony tenderness. X-ray of the leg shows no acute fracture and INR is elevated over 4.5. I will have him hold his INR for his Coumadin today and tomorrow for recheck of INR. We will also clean the wound and apply compression dressing. He was given the contusion and abrasion sheet.     Diagnosis:    ICD-10-CM   1. Contusion of multiple sites of right lower extremity, initial encounter S80.11XA   2. Abrasion of right lower extremity, initial encounter S80.811A   3. Elevated INR R79.1       Disposition: Discharged to home.    I, Lindsay Byrd, am serving as a scribe at 3:11 PM on 2019 to document services personally performed by Simba Jovel MD based on my observations and the provider's statements to me.     Lindsay Byrd  2019    EMERGENCY DEPARTMENT       Simba Jovel MD  [phone]

## 2019-12-05 NOTE — ED AVS SNAPSHOT
Emergency Department  [address] [place] [address]  Phone:  [phone]  Fax:  [phone]                                    Chance Shrestha   MRN: [id number]    Department:   Emergency Department   Date of Visit:  12/5/2019           After Visit Summary Signature Page    I have received my discharge instructions, and my questions have been answered. I have discussed any challenges I see with this plan with the nurse or doctor.    ..........................................................................................................................................  Patient/Patient Representative Signature      ..........................................................................................................................................  Patient Representative Print Name and Relationship to Patient    ..................................................               ................................................  Date                                   Time    ..........................................................................................................................................  Reviewed by Signature/Title    ...................................................              ..............................................  Date                                               Time          22EPIC Rev 08/18

## 2019-12-05 NOTE — ED TRIAGE NOTES
Pt from assisted living - arrived via EMS - pt had a fall out of bed 2 nights ago hitting right shin on the floor - increase redness swelling and pain - pt sent in to r/oDVT   Pt is wheelchair bound

## 2019-12-05 NOTE — ED NOTES
Bed: ED05  Expected date:   Expected time:   Means of arrival:   Comments:  Gabrielle  66 F weak

## 2019-12-09 ENCOUNTER — RECORDS - HEALTHEAST (OUTPATIENT)
Dept: LAB | Facility: CLINIC | Age: 79
End: 2019-12-09

## 2019-12-09 LAB — INR PPP: 1.77 (ref 0.9–1.1)

## 2019-12-17 ENCOUNTER — RECORDS - HEALTHEAST (OUTPATIENT)
Dept: LAB | Facility: CLINIC | Age: 79
End: 2019-12-17

## 2019-12-17 LAB — INR PPP: 3.07 (ref 0.9–1.1)

## 2019-12-18 NOTE — ADDENDUM NOTE
Encounter addended by: Carmella Alejo RN on: 12/18/2019 9:41 AM   Actions taken: Charge Capture section accepted

## 2019-12-19 ENCOUNTER — RECORDS - HEALTHEAST (OUTPATIENT)
Dept: LAB | Facility: CLINIC | Age: 79
End: 2019-12-19

## 2019-12-19 LAB — INR PPP: 3.83 (ref 0.9–1.1)

## 2019-12-23 ENCOUNTER — RECORDS - HEALTHEAST (OUTPATIENT)
Dept: LAB | Facility: CLINIC | Age: 79
End: 2019-12-23

## 2019-12-23 LAB — INR PPP: 2.57 (ref 0.9–1.1)

## 2019-12-26 ENCOUNTER — RECORDS - HEALTHEAST (OUTPATIENT)
Dept: LAB | Facility: CLINIC | Age: 79
End: 2019-12-26

## 2019-12-26 LAB — INR PPP: 2.52 (ref 0.9–1.1)

## 2020-01-02 ENCOUNTER — RECORDS - HEALTHEAST (OUTPATIENT)
Dept: LAB | Facility: CLINIC | Age: 80
End: 2020-01-02

## 2020-01-02 LAB — INR PPP: 2.03 (ref 0.9–1.1)

## 2020-01-06 ENCOUNTER — TRANSFERRED RECORDS (OUTPATIENT)
Dept: HEALTH INFORMATION MANAGEMENT | Facility: CLINIC | Age: 80
End: 2020-01-06

## 2020-01-08 ENCOUNTER — HOSPITAL ENCOUNTER (OUTPATIENT)
Dept: WOUND CARE | Facility: CLINIC | Age: 80
Discharge: HOME | End: 2020-01-08
Attending: PHYSICIAN ASSISTANT | Admitting: PHYSICIAN ASSISTANT
Payer: MEDICARE

## 2020-01-08 VITALS
RESPIRATION RATE: 19 BRPM | SYSTOLIC BLOOD PRESSURE: 165 MMHG | DIASTOLIC BLOOD PRESSURE: 86 MMHG | TEMPERATURE: 97.1 F | HEART RATE: 60 BPM

## 2020-01-08 DIAGNOSIS — S81.801A OPEN WOUND OF LOWER LIMB, RIGHT, INITIAL ENCOUNTER: ICD-10-CM

## 2020-01-08 PROCEDURE — 11042 DBRDMT SUBQ TIS 1ST 20SQCM/<: CPT | Performed by: PHYSICIAN ASSISTANT

## 2020-01-08 PROCEDURE — 99213 OFFICE O/P EST LOW 20 MIN: CPT | Mod: 25 | Performed by: PHYSICIAN ASSISTANT

## 2020-01-08 PROCEDURE — 11042 DBRDMT SUBQ TIS 1ST 20SQCM/<: CPT

## 2020-01-08 RX ORDER — CLINDAMYCIN HCL 150 MG
CAPSULE ORAL
COMMUNITY
Start: 2020-01-06

## 2020-01-08 NOTE — DISCHARGE INSTRUCTIONS
[facility]  [address] [address] [address]    Call us at [phone] if you have any questions about your wounds, have redness or swelling around your wound, have a fever of 101 or greater or if you have any other problems or concerns. We answer the phone Monday through Friday 8 am to 4 pm, please leave a message as we check the voicemail frequently throughout the day.     Chance Shrestha      1940    [facility] Phone [phone] Fax [phone]    Dressing to right lower leg trauma wound  Cleanse with wound cleanser, lightly pack with Mesalt followed by Spandage then gauze on the outside, perform daily  Please raise your legs above your heart for 30 mins 3 times a day to promote wound healing.     Anali Parr PA-C. January 8, 2020    Wound Healing Clinic follow up with Dr. Jones next Monday  [phone]

## 2020-01-09 ENCOUNTER — TELEPHONE (OUTPATIENT)
Dept: WOUND CARE | Facility: CLINIC | Age: 80
End: 2020-01-09

## 2020-01-09 ENCOUNTER — RECORDS - HEALTHEAST (OUTPATIENT)
Dept: LAB | Facility: CLINIC | Age: 80
End: 2020-01-09

## 2020-01-09 LAB — INR PPP: 2.81 (ref 0.9–1.1)

## 2020-01-09 NOTE — TELEPHONE ENCOUNTER
Kayla the [facility] wants to know if Don can wait until 2/3/20 to see Robert as he can not get a ride until after 9am for transportation.   Her number is  ext [phone].

## 2020-01-09 NOTE — TELEPHONE ENCOUNTER
Called and spoke to Kayla, made an appointment with Britt Parr on 1-21-20 at 10:30 and will see Dr. Jones 2-3-20 at 10:45.

## 2020-01-13 ENCOUNTER — RECORDS - HEALTHEAST (OUTPATIENT)
Dept: LAB | Facility: CLINIC | Age: 80
End: 2020-01-13

## 2020-01-13 LAB — INR PPP: 3.37 (ref 0.9–1.1)

## 2020-01-14 ENCOUNTER — TELEPHONE (OUTPATIENT)
Dept: WOUND CARE | Facility: CLINIC | Age: 80
End: 2020-01-14

## 2020-01-14 NOTE — TELEPHONE ENCOUNTER
Cassie from [facility] called and stated Spandage is a special order and girlfriend threw out all spandage from [facility].     She will continue to use mesalt/ ABD/ Kerlix and coban to cover.

## 2020-01-14 NOTE — TELEPHONE ENCOUNTER
Cassie from [facility] wants to change orders for Don as bandage is not sticking.  Please call her at .

## 2020-01-15 ENCOUNTER — TELEPHONE (OUTPATIENT)
Dept: WOUND CARE | Facility: CLINIC | Age: 80
End: 2020-01-15

## 2020-01-15 NOTE — TELEPHONE ENCOUNTER
Returned message and left voicemail letting Rachael know that we did see patient on 1-8-2020 for right lower leg ulcer and have follow up scheduled on 1-21-20.

## 2020-01-15 NOTE — TELEPHONE ENCOUNTER
Reason for Call:  Referral Question  called regarding  Nurse is calling to follow up on Referral sent by her, She will like to know has an appointment been made, and have referral been received.      Pt Provider: SWATI Salomon M.D.     Phone Number can be reached at: [phone]     Can we leave a detailed message on this number? YES      Valentina Ken on 1/15/2020 at 9:01 AM      Routed to  Wound Healing Nurse Pool

## 2020-01-21 ENCOUNTER — HOSPITAL ENCOUNTER (OUTPATIENT)
Dept: WOUND CARE | Facility: CLINIC | Age: 80
Discharge: HOME | End: 2020-01-21
Attending: PHYSICIAN ASSISTANT | Admitting: PHYSICIAN ASSISTANT
Payer: MEDICARE

## 2020-01-21 VITALS
TEMPERATURE: 97 F | RESPIRATION RATE: 18 BRPM | DIASTOLIC BLOOD PRESSURE: 65 MMHG | SYSTOLIC BLOOD PRESSURE: 126 MMHG | HEART RATE: 61 BPM

## 2020-01-21 DIAGNOSIS — S81.801A OPEN WOUND OF LOWER LIMB, RIGHT, INITIAL ENCOUNTER: ICD-10-CM

## 2020-01-21 PROCEDURE — 11042 DBRDMT SUBQ TIS 1ST 20SQCM/<: CPT | Performed by: PHYSICIAN ASSISTANT

## 2020-01-21 PROCEDURE — 11042 DBRDMT SUBQ TIS 1ST 20SQCM/<: CPT

## 2020-01-21 PROCEDURE — A6021 COLLAGEN DRESSING <=16 SQ IN: HCPCS

## 2020-01-21 PROCEDURE — A6212 FOAM DRG <=16 SQ IN W/BORDER: HCPCS

## 2020-01-21 NOTE — DISCHARGE INSTRUCTIONS
[facility]  [address] [facility] [address] [address]    Call us at [phone] if you have any questions about your wounds, have redness or  swelling around your wound, have a fever of 101 or greater or if you have any other  problems or concerns. We answer the phone Monday through Friday 8 am to 4 pm,  please leave a message as we check the voicemail frequently throughout the day.    Chance Shrestha 1940    [facility] Phone [phone] Fax [phone]    Dressing to right lower leg trauma wound  Cleanse with wound cleanser, apply thin layer of Critic-Aid to periwound tissue, lightly pack wound with Endoform followed by Spandage then gauze on the outside, perform daily    Please raise your legs above your heart for 30 mins 3 times a day to promote wound healing.    Anali Parr PA-C. January 21, 2020    Wound Healing Clinic follow up with Dr. Jones February 3, 2020 at 10:45 [phone]

## 2020-01-21 NOTE — PROGRESS NOTES
[place] [facility]    HISTORY OF PRESENT ILLNESS: Chance Shrestha is an 79 year old male who returns today for a new right lower leg wound. Appears consistent with hematoma after falling out of bed on 12/3. History significant for PAD, coumadin anticoagulation, ischemic heart disease s/p artificial heart valve with double bypass, a fib, HTN, hyperlipidemia, type 2 DM, and systolic CHF.     Known to us for previous left toe wound, was seen by Dr. Jones due to PAD. Underwent angio 7/17/19 with angioplasty of the left peroneal artery. RLE was noted to have patent common femoral, profunda femoral, superficial femoral with mild multifocal stenosis of the popliteal artery, anterior and posterior tibial artery occlude in the proximal calf with single vessel runoff via the peroneal artery.     INTERVAL HISTORY 1/21/20:     Wound improving, now granular    Sounds like spandage was not being applied as we had intended it to be used (for edema control)    BARRIERS TO HEALING: DM2, neuropathy, abnormal gait, PAD    DATE WOUND ACQUIRED: 12/3/2019    REVIEW OF SYSTEMS:  CONSTITUTIONAL: Denies fevers or acute illness  ENDOCRINE: diabetes is well controlled    PAST MEDICAL HISTORY:  has a past medical history of Constipation, Depression, DM type 2 (diabetes mellitus, type 2) (H), GERD (gastroesophageal reflux disease), Hernia, abdominal, HLD (hyperlipidemia), HTN (hypertension), Hypothyroid, Ischemic heart disease, Mumps, Seizures (H), and Systolic CHF (H).    SOCIAL HISTORY: resides in assisted living facility with home care nursing  TOBACCO STATUS:  reports that he has never smoked. He has never used smokeless tobacco.    MEDICATIONS:   Current Outpatient Medications   Medication     acetaminophen (TYLENOL) 32 mg/mL solution     acetaminophen (TYLENOL) 325 MG tablet     amLODIPine (NORVASC) 10 MG tablet     atorvastatin (LIPITOR) 80 MG tablet     Cholecalciferol (VITAMIN D-3) 5000 UNIT/ML LIQD     cholecalciferol  (VITAMIN D3) 125 MCG (5000 UT) TABS tablet     ciprofloxacin (CIPRO) 250 MG tablet     clindamycin (CLEOCIN) 150 MG capsule     Dextromethorphan-guaiFENesin  MG/5ML LIQD     enoxaparin (LOVENOX) 80 MG/0.8ML syringe     ezetimibe (ZETIA) 10 MG tablet     fluticasone (FLONASE) 50 MCG/ACT nasal spray     fluticasone (FLONASE) 50 MCG/ACT nasal spray     glimepiride (AMARYL) 1 MG tablet     glimepiride (AMARYL) 2 MG tablet     ketorolac (TORADOL) 10 MG tablet     ketorolac (TORADOL) 10 MG tablet     levothyroxine (SYNTHROID/LEVOTHROID) 25 MCG tablet     lisinopril (PRINIVIL/ZESTRIL) 10 MG tablet     metFORMIN (GLUCOPHAGE) 1000 MG tablet     metoprolol tartrate (LOPRESSOR) 100 MG tablet     mirtazapine (REMERON) 15 MG tablet     Multiple Vitamin (DAILY VITES) TABS     ondansetron (ZOFRAN-ODT) 4 MG ODT tab     order for DME     polyethylene glycol (MIRALAX/GLYCOLAX) powder     polyethylene glycol 3350 POWD     ranitidine (ZANTAC) 75 MG tablet     risperiDONE (RISPERDAL) 1 MG tablet     traMADol (ULTRAM) 50 MG tablet     venlafaxine (EFFEXOR-XR) 37.5 MG 24 hr capsule     venlafaxine (EFFEXOR-XR) 75 MG 24 hr capsule     warfarin (COUMADIN) 7.5 MG tablet     warfarin ANTICOAGULANT (COUMADIN) 5 MG tablet     warfarin ANTICOAGULANT (COUMADIN) 7.5 MG tablet     No current facility-administered medications for this encounter.        VITALS: /65   Pulse 61   Temp 97  F (36.1  C) (Temporal)   Resp 18      PHYSICAL EXAM:  GENERAL: Patient is alert and oriented and in no acute distress  INTEGUMENTARY:   Wound (used by OP [facility] only) 01/08/20 1452 Right anterior;lower leg trauma (Active)   Length (cm) 3 1/21/2020 10:00 AM   Width (cm) 1.5 1/21/2020 10:00 AM   Depth (cm) 1 1/21/2020 10:00 AM   Wound (cm^2) 4.5 cm^2 1/21/2020 10:00 AM   Wound Volume (cm^3) 4.5 cm^3 1/21/2020 10:00 AM   Wound healing % 24.24 1/21/2020 10:00 AM   Undermining [Depth (cm)/Location] 6-10/1 1/21/2020 10:00 AM   Dressing Appearance moist drainage  1/21/2020 10:00 AM   Drainage Characteristics/Odor serosanguineous 1/21/2020 11:07 AM   Drainage Amount moderate 1/21/2020 11:07 AM   Thickness/Stage full thickness 1/21/2020 11:07 AM   Base red/granulating;slough 1/21/2020 11:07 AM   Periwound intact;edematous 1/21/2020 11:07 AM   Periwound Temperature warm 1/21/2020 11:07 AM   Care, Wound debrided 1/21/2020 11:07 AM         PROCEDURE: 4% topical lidocaine was applied to the wound by nursing staff. Patient was determined to be capable of making their own medical decisions and informed consent was obtained. Using a 15 blade a surgical debridement was performed down to and including subcutaneous tissue of <20 cm. Hemostasis was achieved with pressure. The patient tolerated the procedure well.      ASSESSMENT:   1. Full-thickness hematoma ulcer of right lower leg with fat-layer exposed    PLAN:   1. Wound care plan: pack with endoform, Spandage then gauze, criticaid to periwound to protect from maceration  2. Will have him see Dr. Jones at one of his follow-ups to have updated vascular assessment    FOLLOW-UP: 2 weeks    MUNA DEL VALLE PA-C (DYKSTRA)

## 2020-01-22 ENCOUNTER — RECORDS - HEALTHEAST (OUTPATIENT)
Dept: LAB | Facility: CLINIC | Age: 80
End: 2020-01-22

## 2020-01-22 LAB — INR PPP: 4.4 (ref 0.9–1.1)

## 2020-01-27 ENCOUNTER — RECORDS - HEALTHEAST (OUTPATIENT)
Dept: LAB | Facility: CLINIC | Age: 80
End: 2020-01-27

## 2020-01-27 LAB — INR PPP: 2.34 (ref 0.9–1.1)

## 2020-02-03 ENCOUNTER — HOSPITAL ENCOUNTER (OUTPATIENT)
Dept: WOUND CARE | Facility: CLINIC | Age: 80
Discharge: HOME | End: 2020-02-03
Attending: SURGERY | Admitting: SURGERY
Payer: MEDICARE

## 2020-02-03 VITALS — DIASTOLIC BLOOD PRESSURE: 81 MMHG | HEART RATE: 69 BPM | TEMPERATURE: 96.7 F | SYSTOLIC BLOOD PRESSURE: 152 MMHG

## 2020-02-03 DIAGNOSIS — S81.801A OPEN WOUND OF LOWER LIMB, RIGHT, INITIAL ENCOUNTER: ICD-10-CM

## 2020-02-03 DIAGNOSIS — L97.509 TYPE 2 DIABETES MELLITUS WITH FOOT ULCER (H): ICD-10-CM

## 2020-02-03 DIAGNOSIS — E11.621 TYPE 2 DIABETES MELLITUS WITH FOOT ULCER (H): ICD-10-CM

## 2020-02-03 PROCEDURE — 11042 DBRDMT SUBQ TIS 1ST 20SQCM/<: CPT | Performed by: SURGERY

## 2020-02-03 PROCEDURE — A6021 COLLAGEN DRESSING <=16 SQ IN: HCPCS

## 2020-02-03 PROCEDURE — 97602 WOUND(S) CARE NON-SELECTIVE: CPT | Mod: XS

## 2020-02-03 PROCEDURE — 11042 DBRDMT SUBQ TIS 1ST 20SQCM/<: CPT

## 2020-02-03 NOTE — DISCHARGE INSTRUCTIONS
[facility]  [address] [address]    Call us at [phone] if you have any questions about your wounds, have redness or  swelling around your wound, have a fever of 101 or greater or if you have any other  problems or concerns. We answer the phone Monday through Friday 8 am to 4 pm,  please leave a message as we check the voicemail frequently throughout the day.    Chance Shrestha 1940    [facility] Phone [phone] Fax [phone]    Dressing to right lower leg trauma wound  Cleanse with wound cleanser, cover wound with Endoform followed by Spandage then gauze on the outside, perform daily    May protect any blistered areas on right toes with bandages and change as needed    Please raise your legs above your heart for 30 mins 3 times a day to promote  wound healing.    Dr. Alban Jones, January 3, 2020    Wound Healing Clinic follow up with Dr. Jones February 24, 2020 at 10:00

## 2020-02-03 NOTE — PROGRESS NOTES
[facility]ound [facility]aling [place] Progress Note    Subject: Chance Shrestha slipped and fell on 12/5/2019 hitting his right distal anterior lateral calf and causing hematoma and subsequent ulcer.  Been followed in the wound clinic.  Presently using endoform with gradual improvement.    Has underlying PAD.  Angiography on 7/17/2019 revealed bilateral trifurcation disease.  On the left leg where he had foot ulcers the anterior tibial and posterior tibial eyes were occluded.  Diseased peroneal artery was noted is the only runoff that was angioplastied.  Collaterals reconstituted the dorsalis pedis artery at the ankle.  On the right side peroneal artery was again the only runoff is did not require angioplasty.    Right ulcers had subsequently healed.    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)     PVD (peripheral vascular disease) (H)     SAH (subarachnoid hemorrhage) (H)     Status post amputation of right great toe (H)     Tobacco use     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)     Uncomplicated alcohol abuse     Vertigo as late effect of stroke     Warfarin anticoagulation     PAD (peripheral artery disease) (H)     Open wound of lower limb, right, initial encounter     Past Medical History:   Diagnosis Date     Constipation      Depression      DM type 2 (diabetes mellitus, type 2) (H)       GERD (gastroesophageal reflux disease)      Hernia, abdominal      HLD (hyperlipidemia)      HTN (hypertension)      Hypothyroid      Ischemic heart disease      Mumps     as child      Seizures (H)      Systolic CHF (H)      Exam:  BP (!) 152/81   Pulse 69   Temp 96.7  F (35.9  C) (Temporal)   Wound (used by [facility] only) 02/03/20 1103 Right 4 toe (Active)   Length (cm) 0.8 2/3/2020 11:00 AM   Width (cm) 0.5 2/3/2020 11:00 AM   Depth (cm) 0 2/3/2020 11:00 AM   Wound (cm^2) 0.4 cm^2 2/3/2020 11:00 AM   Wound Volume (cm^3) 0 cm^3 2/3/2020 11:00 AM   Dressing Appearance moist drainage 2/3/2020 11:00 AM   Drainage Characteristics/Odor serosanguineous 2/3/2020 11:00 AM   Drainage Amount moderate 2/3/2020 11:00 AM       Wound (used by [facility] only) 02/03/20 1104 Right 5 toe (Active)   Length (cm) 1.9 2/3/2020 11:00 AM   Width (cm) 1 2/3/2020 11:00 AM   Depth (cm) 0 2/3/2020 11:00 AM   Wound (cm^2) 1.9 cm^2 2/3/2020 11:00 AM   Wound Volume (cm^3) 0 cm^3 2/3/2020 11:00 AM     Alert and appropriate.  Here with his wife.  Frail.  Right mid anterior lateral calf ulcer has a thick layer of fibrin slough measuring is 3 x 1.5 x 0.3 cm    Has very superficial blister types over the right dorsal fourth and fifth toes.  No ulcers on the left foot.    Patient has swelling of his left forearm and hand.  He does have ecchymosis and likely he did bump this with an underlying hematoma with the overlying skin being normal.  +3 left radial pulse.    Procedure:   Patient was determined to be capable of making their own medical decisions and informed consent was obtained. Topical anesthetic of 4% lidocaine was applied, debridement was performed using a #15 blade down to and including subcutaneous tissue.  All slough and fibrin removed and skin edges debrided on the calf with excellent granulation tissue filling the entire wound bed.  No undermining.  Bleeding controlled with light pressure. Patient tolerated procedure well.    No  debridement necessary on the toe blisters.    Impression: #1.  Improving right calf ulcer.  Despite severe PAD has very adequate blood supply at this level.  Continue with endoform.    #2.  Blister is possibly due to pressure on his right toes.  Concerned due to his very poor blood supply.  He does have a very adequate shoe with a high toe box with causing no pressure.  May be related to his stockings bunching up and this is discussed with the patient and his wife to check this every time to make sure these do not worsen.  Otherwise no specific care is indicated.      #3.  Left forearm hematoma likely related to mild trauma and is anticoagulation.  Swelling in his hand but no vascular compromise or skin issues.    Plan: We will dress the wounds with endoform.  Patient will return to the clinic in 3 weeks time    Alban Jones MD on 2/3/2020 at 11:19 AM

## 2020-02-04 ENCOUNTER — TELEPHONE (OUTPATIENT)
Dept: WOUND CARE | Facility: CLINIC | Age: 80
End: 2020-02-04

## 2020-02-04 ENCOUNTER — RECORDS - HEALTHEAST (OUTPATIENT)
Dept: LAB | Facility: CLINIC | Age: 80
End: 2020-02-04

## 2020-02-04 LAB — INR PPP: 3.56 (ref 0.9–1.1)

## 2020-02-11 ENCOUNTER — RECORDS - HEALTHEAST (OUTPATIENT)
Dept: LAB | Facility: CLINIC | Age: 80
End: 2020-02-11

## 2020-02-11 LAB
ALBUMIN SERPL-MCNC: 3.6 G/DL (ref 3.5–5)
ANION GAP SERPL CALCULATED.3IONS-SCNC: 10 MMOL/L (ref 5–18)
BUN SERPL-MCNC: 25 MG/DL (ref 8–28)
CALCIUM SERPL-MCNC: 9.1 MG/DL (ref 8.5–10.5)
CHLORIDE BLD-SCNC: 100 MMOL/L (ref 98–107)
CHOLEST SERPL-MCNC: 152 MG/DL
CO2 SERPL-SCNC: 24 MMOL/L (ref 22–31)
CREAT SERPL-MCNC: 1.42 MG/DL (ref 0.7–1.3)
ERYTHROCYTE [DISTWIDTH] IN BLOOD BY AUTOMATED COUNT: 13.5 % (ref 11–14.5)
FASTING STATUS PATIENT QL REPORTED: ABNORMAL
GFR SERPL CREATININE-BSD FRML MDRD: 48 ML/MIN/1.73M2
GLUCOSE BLD-MCNC: 307 MG/DL (ref 70–125)
HCT VFR BLD AUTO: 36.5 % (ref 40–54)
HDLC SERPL-MCNC: 32 MG/DL
HGB BLD-MCNC: 11.4 G/DL (ref 14–18)
INR PPP: 2.96 (ref 0.9–1.1)
LDLC SERPL CALC-MCNC: 83 MG/DL
MCH RBC QN AUTO: 30.8 PG (ref 27–34)
MCHC RBC AUTO-ENTMCNC: 31.2 G/DL (ref 32–36)
MCV RBC AUTO: 99 FL (ref 80–100)
PHOSPHATE SERPL-MCNC: 2.9 MG/DL (ref 2.5–4.5)
PLATELET # BLD AUTO: 313 THOU/UL (ref 140–440)
PMV BLD AUTO: 10.5 FL (ref 8.5–12.5)
POTASSIUM BLD-SCNC: 5.2 MMOL/L (ref 3.5–5)
RBC # BLD AUTO: 3.7 MILL/UL (ref 4.4–6.2)
SODIUM SERPL-SCNC: 134 MMOL/L (ref 136–145)
TRIGL SERPL-MCNC: 185 MG/DL
TSH SERPL DL<=0.005 MIU/L-ACNC: 0.74 UIU/ML (ref 0.3–5)
WBC: 8.2 THOU/UL (ref 4–11)

## 2020-02-11 NOTE — ADDENDUM NOTE
Encounter addended by: Ana Louise RN on: 2/11/2020 9:29 AM   Actions taken: Flowsheet accepted, Visit diagnoses modified, Charge Capture section accepted

## 2020-02-12 ENCOUNTER — RECORDS - HEALTHEAST (OUTPATIENT)
Dept: LAB | Facility: CLINIC | Age: 80
End: 2020-02-12

## 2020-02-12 LAB
25(OH)D3 SERPL-MCNC: 70.6 NG/ML (ref 30–80)
FERRITIN SERPL-MCNC: 225 NG/ML (ref 27–300)
FOLATE SERPL-MCNC: 18.1 NG/ML
HBA1C MFR BLD: 6.1 % (ref 4.2–6.1)
IRON SERPL-MCNC: 63 UG/DL (ref 42–175)
VIT B12 SERPL-MCNC: 224 PG/ML (ref 213–816)

## 2020-02-18 ENCOUNTER — RECORDS - HEALTHEAST (OUTPATIENT)
Dept: LAB | Facility: CLINIC | Age: 80
End: 2020-02-18

## 2020-02-18 LAB
ALBUMIN SERPL-MCNC: 3.5 G/DL (ref 3.5–5)
ANION GAP SERPL CALCULATED.3IONS-SCNC: 8 MMOL/L (ref 5–18)
BUN SERPL-MCNC: 21 MG/DL (ref 8–28)
CALCIUM SERPL-MCNC: 9.6 MG/DL (ref 8.5–10.5)
CHLORIDE BLD-SCNC: 103 MMOL/L (ref 98–107)
CO2 SERPL-SCNC: 25 MMOL/L (ref 22–31)
CREAT SERPL-MCNC: 1.31 MG/DL (ref 0.7–1.3)
GFR SERPL CREATININE-BSD FRML MDRD: 53 ML/MIN/1.73M2
GLUCOSE BLD-MCNC: 194 MG/DL (ref 70–125)
INR PPP: 2.51 (ref 0.9–1.1)
PHOSPHATE SERPL-MCNC: 2.4 MG/DL (ref 2.5–4.5)
POTASSIUM BLD-SCNC: 4.5 MMOL/L (ref 3.5–5)
SODIUM SERPL-SCNC: 136 MMOL/L (ref 136–145)

## 2020-02-24 ENCOUNTER — HOSPITAL ENCOUNTER (OUTPATIENT)
Dept: WOUND CARE | Facility: CLINIC | Age: 80
Discharge: HOME | End: 2020-02-24
Attending: SURGERY | Admitting: SURGERY
Payer: MEDICARE

## 2020-02-24 ENCOUNTER — TELEPHONE (OUTPATIENT)
Dept: WOUND CARE | Facility: CLINIC | Age: 80
End: 2020-02-24

## 2020-02-24 VITALS — TEMPERATURE: 97.5 F | HEART RATE: 57 BPM | DIASTOLIC BLOOD PRESSURE: 75 MMHG | SYSTOLIC BLOOD PRESSURE: 141 MMHG

## 2020-02-24 DIAGNOSIS — S81.002A OPEN WOUND OF KNEE, LEG, AND ANKLE, LEFT, INITIAL ENCOUNTER: ICD-10-CM

## 2020-02-24 DIAGNOSIS — S81.801A OPEN WOUND OF LOWER LIMB, RIGHT, INITIAL ENCOUNTER: ICD-10-CM

## 2020-02-24 DIAGNOSIS — S91.002A OPEN WOUND OF KNEE, LEG, AND ANKLE, LEFT, INITIAL ENCOUNTER: ICD-10-CM

## 2020-02-24 DIAGNOSIS — S81.802A OPEN WOUND OF KNEE, LEG, AND ANKLE, LEFT, INITIAL ENCOUNTER: ICD-10-CM

## 2020-02-24 PROCEDURE — 99212 OFFICE O/P EST SF 10 MIN: CPT | Mod: 25 | Performed by: SURGERY

## 2020-02-24 PROCEDURE — 11042 DBRDMT SUBQ TIS 1ST 20SQCM/<: CPT | Performed by: SURGERY

## 2020-02-24 PROCEDURE — 11042 DBRDMT SUBQ TIS 1ST 20SQCM/<: CPT

## 2020-02-24 PROCEDURE — A6021 COLLAGEN DRESSING <=16 SQ IN: HCPCS

## 2020-02-24 NOTE — PROGRESS NOTES
[facility] Wound Healing [place] Progress Note    Subject: Chance Shrestha has a long history of bilateral vascular disease.  He is undergone prior angioplasties.  Very limited runoff is been noted.  He has had ulcerations over his right dorsal fourth and fifth toes and the medial left great toe.  These have been slowly improving with endoform and Spandage.  However he must of bumped his right lateral foot and ankle region developed 2 small ulcerations that have no signs of infection.  They may have been due to issues and now is wearing low slippers that are well-padded on the sides to prevent this.    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)     PVD (peripheral vascular disease) (H)     SAH (subarachnoid hemorrhage) (H)     Status post amputation of right great toe (H)     Tobacco use     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)     Uncomplicated alcohol abuse     Vertigo as late effect of stroke     Warfarin anticoagulation     PAD (peripheral artery disease) (H)     Open wound of lower limb, right, initial encounter     Past Medical History:   Diagnosis Date     Constipation      Depression      DM type 2 (diabetes mellitus, type 2) (H)      GERD (gastroesophageal reflux disease)      Hernia, abdominal      HLD (hyperlipidemia)      HTN (hypertension)      Hypothyroid      Ischemic heart  disease      Mumps     as child      Seizures (H)      Systolic CHF (H)      Exam:  BP (!) 141/75   Pulse 57   Temp 97.5  F (36.4  C) (Temporal)   Wound (used by OP [facility] only) 02/03/20 1103 Right 4 toe trauma (Active)   Length (cm) 0.7 2/24/2020 10:41 AM   Width (cm) 0.5 2/24/2020 10:41 AM   Depth (cm) 0.1 2/24/2020 10:41 AM   Wound (cm^2) 0.35 cm^2 2/24/2020 10:41 AM   Wound Volume (cm^3) 0.04 cm^3 2/24/2020 10:41 AM   Wound healing % 12.5 2/24/2020 10:41 AM   Dressing Appearance moist drainage 2/24/2020 10:41 AM   Drainage Characteristics/Odor serosanguineous 2/24/2020 10:41 AM   Drainage Amount moderate 2/24/2020 10:41 AM       Wound (used by OP [facility] only) 02/03/20 1104 Right 5 toe trauma (Active)   Length (cm) 1.5 2/24/2020 10:42 AM   Width (cm) 1.1 2/24/2020 10:42 AM   Depth (cm) 0.1 2/24/2020 10:42 AM   Wound (cm^2) 1.65 cm^2 2/24/2020 10:42 AM   Wound Volume (cm^3) 0.17 cm^3 2/24/2020 10:42 AM   Wound healing % 13.16 2/24/2020 10:42 AM   Dressing Appearance moist drainage 2/24/2020 10:42 AM   Drainage Characteristics/Odor serosanguineous 2/24/2020 10:42 AM   Drainage Amount moderate 2/24/2020 10:42 AM       Wound (used by OP [facility] only) 02/24/20 1043 Left medial 1 toe (Active)   Length (cm) 1.2 2/24/2020 10:42 AM   Width (cm) 0.8 2/24/2020 10:42 AM   Depth (cm) 0.1 2/24/2020 10:42 AM   Wound (cm^2) 0.96 cm^2 2/24/2020 10:42 AM   Wound Volume (cm^3) 0.1 cm^3 2/24/2020 10:42 AM   Dressing Appearance moist drainage 2/24/2020 10:42 AM   Drainage Characteristics/Odor serosanguineous 2/24/2020 10:42 AM   Drainage Amount moderate 2/24/2020 10:42 AM     Alert and appropriate.  Somewhat frail.  Here with his family.  Ulcerations are noted.  Mild amount of slough is noted on the right fourth and fifth dorsal ulcers and the left medial great toe.  A eschar is noted over the lateral foot and ankle.  A scab is noted in the right tibial area which has no underlying ulcer.    Procedure:   Patient was determined to be capable  of making their own medical decisions and informed consent was obtained. Topical anesthetic of 4% lidocaine was applied, debridement was performed using a #15 blade down to and including subcutaneous tissue.  All sites were debrided.  Slough and fibrin removed from the right fourth and fifth toes in the left medial toe.  Healthy granulation tissue is noted that does not involve any deep structures.  Adequate bleeding.  Remove the scab over the right tibial area with that with completely healed underlying epithelium.  Remove the scabs over the lateral foot with this very superficial ulcer being noted.  No signs of infection.  Bleeding controlled with light pressure. Patient tolerated procedure well.    Impression: Multiple ulcerations with underlying non-correctable distal PAD status post angioplasty.  We are seeing slow improvement.  Needs to avoid any trauma.  We will treat all of the ulcers including the new left lateral foot with endoform.  Stressed the importance of good nutrition.    Plan: We will dress the wounds with endoform 3 times weekly with home health care.  Patient will return to the clinic in 2 weeks time    Alban Jones MD on 2/24/2020 at 11:17 AM

## 2020-02-24 NOTE — TELEPHONE ENCOUNTER
Cassie the nurse from [facility] needs the orders for Chance as his girlfriend will not give them to her.

## 2020-02-24 NOTE — TELEPHONE ENCOUNTER
Returned call to Cassie to verify fax number. We have been faxing orders to [phone] which she states is correct but have an alternate fax of [phone] to fax orders to as well. Orders faxed.

## 2020-02-24 NOTE — DISCHARGE INSTRUCTIONS
[facility]  [address] [address]    Call us at [phone] if you have any questions about your wounds, have redness or  swelling around your wound, have a fever of 101 or greater or if you have any other  problems or concerns. We answer the phone Monday through Friday 8 am to 4 pm,  please leave a message as we check the voicemail frequently throughout the day.    Chance Shrestha 1940    [facility] Phone [phone] Fax [phone]    Dressing to right and left lower leg wounds  Cleanse with wound cleanser, cover wound with Endoform followed by Spandage then gauze on the outside, perform daily    Please raise your legs above your heart for 30 mins 3 times a day to promote wound healing.    Dr. Alban Jones, February 24, 2020    Wound Healing Clinic follow up with Britt Parr PA-C in  2 weeks

## 2020-03-02 ENCOUNTER — MEDICAL CORRESPONDENCE (OUTPATIENT)
Dept: HEALTH INFORMATION MANAGEMENT | Facility: CLINIC | Age: 80
End: 2020-03-02

## 2020-03-03 ENCOUNTER — RECORDS - HEALTHEAST (OUTPATIENT)
Dept: LAB | Facility: CLINIC | Age: 80
End: 2020-03-03

## 2020-03-03 LAB — PHOSPHATE SERPL-MCNC: 2.3 MG/DL (ref 2.5–4.5)

## 2020-03-09 ENCOUNTER — TELEPHONE (OUTPATIENT)
Dept: WOUND CARE | Facility: CLINIC | Age: 80
End: 2020-03-09

## 2020-03-09 ENCOUNTER — HOSPITAL ENCOUNTER (OUTPATIENT)
Dept: WOUND CARE | Facility: CLINIC | Age: 80
Discharge: HOME | End: 2020-03-09
Attending: SURGERY | Admitting: SURGERY
Payer: MEDICARE

## 2020-03-09 VITALS — TEMPERATURE: 97.1 F | SYSTOLIC BLOOD PRESSURE: 110 MMHG | HEART RATE: 63 BPM | DIASTOLIC BLOOD PRESSURE: 63 MMHG

## 2020-03-09 DIAGNOSIS — S81.002A OPEN WOUND OF KNEE, LEG, AND ANKLE, LEFT, INITIAL ENCOUNTER: ICD-10-CM

## 2020-03-09 DIAGNOSIS — S81.801A OPEN WOUND OF LOWER LIMB, RIGHT, INITIAL ENCOUNTER: ICD-10-CM

## 2020-03-09 DIAGNOSIS — S81.802A OPEN WOUND OF KNEE, LEG, AND ANKLE, LEFT, INITIAL ENCOUNTER: ICD-10-CM

## 2020-03-09 DIAGNOSIS — G81.14 LEFT SPASTIC HEMIPARESIS (H): ICD-10-CM

## 2020-03-09 DIAGNOSIS — I73.9 PAD (PERIPHERAL ARTERY DISEASE) (H): ICD-10-CM

## 2020-03-09 DIAGNOSIS — Z89.411 STATUS POST AMPUTATION OF RIGHT GREAT TOE (H): ICD-10-CM

## 2020-03-09 DIAGNOSIS — L97.509 TYPE 2 DIABETES MELLITUS WITH FOOT ULCER, WITHOUT LONG-TERM CURRENT USE OF INSULIN (H): ICD-10-CM

## 2020-03-09 DIAGNOSIS — F09 COGNITIVE DISORDER: ICD-10-CM

## 2020-03-09 DIAGNOSIS — I73.9 PVD (PERIPHERAL VASCULAR DISEASE) (H): ICD-10-CM

## 2020-03-09 DIAGNOSIS — S91.002A OPEN WOUND OF KNEE, LEG, AND ANKLE, LEFT, INITIAL ENCOUNTER: ICD-10-CM

## 2020-03-09 DIAGNOSIS — E11.621 TYPE 2 DIABETES MELLITUS WITH FOOT ULCER, WITHOUT LONG-TERM CURRENT USE OF INSULIN (H): ICD-10-CM

## 2020-03-09 DIAGNOSIS — Z79.01 WARFARIN ANTICOAGULATION: ICD-10-CM

## 2020-03-09 DIAGNOSIS — R26.89 IMBALANCE: ICD-10-CM

## 2020-03-09 DIAGNOSIS — G51.0 CRANIAL NERVE VII PALSY: Primary | ICD-10-CM

## 2020-03-09 PROCEDURE — A6248 HYDROGEL DRSG GEL FILLER: HCPCS

## 2020-03-09 PROCEDURE — 11042 DBRDMT SUBQ TIS 1ST 20SQCM/<: CPT

## 2020-03-09 PROCEDURE — 11042 DBRDMT SUBQ TIS 1ST 20SQCM/<: CPT | Performed by: PHYSICIAN ASSISTANT

## 2020-03-09 PROCEDURE — A6021 COLLAGEN DRESSING <=16 SQ IN: HCPCS

## 2020-03-09 NOTE — PROGRESS NOTES
[facility]    HISTORY OF PRESENT ILLNESS: Chance Shrestha is an 79 year old male who returns today for right lower leg and bilateral foot ulcers. History significant for PAD, coumadin anticoagulation, ischemic heart disease s/p artificial heart valve with double bypass, a fib, HTN, hyperlipidemia, type 2 DM, and systolic CHF.     Known to us for previous left toe wound, was seen by Dr. Jones due to PAD. Underwent angio 7/17/19 with angioplasty of the left peroneal artery. RLE was noted to have patent common femoral, profunda femoral, superficial femoral with mild multifocal stenosis of the popliteal artery, anterior and posterior tibial artery occlude in the proximal calf with single vessel runoff via the peroneal artery.     INTERVAL HISTORY 1/21/20:     Overall wounds are improving    Still suspicious of pressure on lateral right foot    Left toe wound has recurred     BARRIERS TO HEALING: DM2, neuropathy, abnormal gait, PAD    DATE WOUND ACQUIRED: 12/3/2019    REVIEW OF SYSTEMS:  CONSTITUTIONAL: Denies fevers or acute illness  ENDOCRINE: diabetes is well controlled    PAST MEDICAL HISTORY:  has a past medical history of Constipation, Depression, DM type 2 (diabetes mellitus, type 2) (H), GERD (gastroesophageal reflux disease), Hernia, abdominal, HLD (hyperlipidemia), HTN (hypertension), Hypothyroid, Ischemic heart disease, Mumps, Seizures (H), and Systolic CHF (H).    SOCIAL HISTORY: resides in assisted living facility with home care nursing  TOBACCO STATUS:  reports that he has never smoked. He has never used smokeless tobacco.    MEDICATIONS:   Current Outpatient Medications   Medication     acetaminophen (TYLENOL) 32 mg/mL solution     acetaminophen (TYLENOL) 325 MG tablet     amLODIPine (NORVASC) 10 MG tablet     atorvastatin (LIPITOR) 80 MG tablet     Cholecalciferol (VITAMIN D-3) 5000 UNIT/ML LIQD     cholecalciferol (VITAMIN D3) 125 MCG (5000 UT) TABS tablet     ciprofloxacin  (CIPRO) 250 MG tablet     clindamycin (CLEOCIN) 150 MG capsule     Dextromethorphan-guaiFENesin  MG/5ML LIQD     enoxaparin (LOVENOX) 80 MG/0.8ML syringe     ezetimibe (ZETIA) 10 MG tablet     fluticasone (FLONASE) 50 MCG/ACT nasal spray     fluticasone (FLONASE) 50 MCG/ACT nasal spray     glimepiride (AMARYL) 1 MG tablet     glimepiride (AMARYL) 2 MG tablet     ketorolac (TORADOL) 10 MG tablet     ketorolac (TORADOL) 10 MG tablet     levothyroxine (SYNTHROID/LEVOTHROID) 25 MCG tablet     lisinopril (PRINIVIL/ZESTRIL) 10 MG tablet     metFORMIN (GLUCOPHAGE) 1000 MG tablet     metoprolol tartrate (LOPRESSOR) 100 MG tablet     mirtazapine (REMERON) 15 MG tablet     Multiple Vitamin (DAILY VITES) TABS     ondansetron (ZOFRAN-ODT) 4 MG ODT tab     order for DME     order for DME     polyethylene glycol (MIRALAX/GLYCOLAX) powder     polyethylene glycol 3350 POWD     ranitidine (ZANTAC) 75 MG tablet     risperiDONE (RISPERDAL) 1 MG tablet     traMADol (ULTRAM) 50 MG tablet     venlafaxine (EFFEXOR-XR) 37.5 MG 24 hr capsule     venlafaxine (EFFEXOR-XR) 75 MG 24 hr capsule     warfarin (COUMADIN) 7.5 MG tablet     warfarin ANTICOAGULANT (COUMADIN) 5 MG tablet     warfarin ANTICOAGULANT (COUMADIN) 7.5 MG tablet     No current facility-administered medications for this encounter.        VITALS: /63   Pulse 63   Temp 97.1  F (36.2  C) (Temporal)      PHYSICAL EXAM:  GENERAL: Patient is alert and oriented and in no acute distress  INTEGUMENTARY:   Wound (used by [facility] only) 01/08/20 1452 Right anterior;lower leg trauma (Active)   Length (cm) 1 03/09/20 1302   Width (cm) 0.5 03/09/20 1302   Depth (cm) 0.1 03/09/20 1302   Wound (cm^2) 0.5 cm^2 03/09/20 1302   Wound Volume (cm^3) 0.05 cm^3 03/09/20 1302   Wound healing % 91.58 03/09/20 1302   Dressing Appearance moist drainage 03/09/20 1302   Drainage Characteristics/Odor serosanguineous 03/09/20 1302   Drainage Amount moderate 03/09/20 1302   Thickness/Stage full  thickness 03/09/20 1302   Base scab 03/09/20 1302   Periwound intact;edematous 03/09/20 1302   Periwound Temperature warm 03/09/20 1302   Care, Wound debrided 03/09/20 1302       Wound (used by [facility] only) 02/03/20 1104 Right 5 toe trauma (Active)   Length (cm) 1 03/09/20 1305   Width (cm) 1 03/09/20 1305   Depth (cm) 0.2 03/09/20 1305   Wound (cm^2) 1 cm^2 03/09/20 1305   Wound Volume (cm^3) 0.2 cm^3 03/09/20 1305   Wound healing % 47.37 03/09/20 1305   Dressing Appearance moist drainage 03/09/20 1305   Drainage Characteristics/Odor serosanguineous 03/09/20 1305   Drainage Amount moderate 03/09/20 1305   Thickness/Stage full thickness 03/09/20 1305   Base red/granulating 03/09/20 1305   Periwound intact 03/09/20 1305   Periwound Temperature warm 03/09/20 1305   Care, Wound debrided 03/09/20 1305       Wound (used by [facility] only) 02/24/20 1043 Left medial 1 toe trauma (Active)   Length (cm) 1.2 03/09/20 1303   Width (cm) 0.7 03/09/20 1303   Depth (cm) 0.2 03/09/20 1303   Wound (cm^2) 0.84 cm^2 03/09/20 1303   Wound Volume (cm^3) 0.17 cm^3 03/09/20 1303   Wound healing % 12.5 03/09/20 1303   Dressing Appearance moist drainage 03/09/20 1303   Drainage Characteristics/Odor serosanguineous 03/09/20 1303   Drainage Amount moderate 03/09/20 1303   Thickness/Stage full thickness 03/09/20 1303   Base red/granulating 03/09/20 1303   Periwound intact;macerated 03/09/20 1303   Periwound Temperature warm 03/09/20 1303   Care, Wound debrided 03/09/20 1303       Wound (used by [facility] only) 03/09/20 1306 Right lateral foot (Active)   Length (cm) 0.4 03/09/20 1306   Width (cm) 0.4 03/09/20 1306   Depth (cm) 0.1 03/09/20 1306   Wound (cm^2) 0.16 cm^2 03/09/20 1306   Wound Volume (cm^3) 0.02 cm^3 03/09/20 1306   Dressing Appearance moist drainage 03/09/20 1306   Drainage Characteristics/Odor serosanguineous 03/09/20 1306   Drainage Amount moderate 03/09/20 1306   Thickness/Stage full thickness 03/09/20 1306   Periwound intact  03/09/20 1306   Periwound Temperature warm 03/09/20 1306   Care, Wound debrided 03/09/20 1306       PROCEDURE: 4% topical lidocaine was applied to the wound by nursing staff. Patient was determined to be capable of making their own medical decisions and informed consent was obtained. Using a 15 blade a surgical debridement was performed down to and including subcutaneous tissue of <20 cm. Hemostasis was achieved with pressure. The patient tolerated the procedure well.      ASSESSMENT:   1. Full-thickness hematoma ulcer of right lower leg with fat-layer exposed  2. Full-thickness trauma ulcers of bilateral toes with fat-layer exposed    PLAN:   1. Wound care plan: continue with endoform  2. [place] [facility] [place]    FOLLOW-UP: 2 weeks    MUNA DEL VALLE PA-C (DYKSTRA)

## 2020-03-09 NOTE — DISCHARGE INSTRUCTIONS
[facility]  [address] [facility] [address] [address]    Call us at [phone] if you have any questions about your wounds, have redness or swelling around your wound, have a fever of 101 or greater or if you have any other problems or concerns. We answer the phone Monday through Friday 8 am to 4 pm, please leave a message as we check the voicemail frequently throughout the day.    Chance Shrestha 1940    [facility] Phone [phone] Fax [phone]     Dressing recommendation to right foot wound, right great toe wound, right lower leg and left great toe wounds  Cleanse with wound cleanser, cover wounds with Woun'dres gel then Endoform followed by Spandage then gauze on the outside, perform daily    Please raise your legs above your heart for 30 mins 3 times a day to promote wound healing.    A referral has been sent to [facility] ([facility]) for custom shoes and inserts. Please call them at [phone] to make an appointment.    Anali Parr PA-C, March 3-9-2020    Wound Healing Clinic follow up with Britt Parr PA-C in 2 weeks

## 2020-03-09 NOTE — TELEPHONE ENCOUNTER
ANTONIA for Rowan FOSTER and told the nurse this is the second time he has been in clinic with fecal smell on his breath. He reported that he had results because he drank some prune juice but I want to make sure that the nurse knows and that a provider needs to be informed so that he gets on a bowel program that works well for him.

## 2020-03-10 ENCOUNTER — RECORDS - HEALTHEAST (OUTPATIENT)
Dept: LAB | Facility: CLINIC | Age: 80
End: 2020-03-10

## 2020-03-10 LAB — INR PPP: 3.48 (ref 0.9–1.1)

## 2020-03-12 ENCOUNTER — TELEPHONE (OUTPATIENT)
Dept: WOUND CARE | Facility: CLINIC | Age: 80
End: 2020-03-12

## 2020-03-12 NOTE — TELEPHONE ENCOUNTER
Cassie from [facility] said Chance's wound are macerated and they would like to speak with a nurse.

## 2020-03-12 NOTE — TELEPHONE ENCOUNTER
Discontinue hydrogel continue with the endoform and add absorptive dressing such as foam or abd or superabsorbent. Cassie said she understood and mentioned allevyn.

## 2020-03-27 ENCOUNTER — RECORDS - HEALTHEAST (OUTPATIENT)
Dept: LAB | Facility: CLINIC | Age: 80
End: 2020-03-27

## 2020-03-27 LAB
ALBUMIN UR-MCNC: ABNORMAL MG/DL
APPEARANCE UR: CLEAR
BACTERIA #/AREA URNS HPF: ABNORMAL HPF
BILIRUB UR QL STRIP: NEGATIVE
CAOX CRY #/AREA URNS HPF: PRESENT /[HPF]
COLOR UR AUTO: YELLOW
GLUCOSE UR STRIP-MCNC: ABNORMAL MG/DL
HGB UR QL STRIP: NEGATIVE
HYALINE CASTS #/AREA URNS LPF: ABNORMAL LPF
KETONES UR STRIP-MCNC: NEGATIVE MG/DL
LEUKOCYTE ESTERASE UR QL STRIP: NEGATIVE
MUCOUS THREADS #/AREA URNS LPF: ABNORMAL LPF
NITRATE UR QL: NEGATIVE
PH UR STRIP: 6.5 [PH] (ref 4.5–8)
RBC #/AREA URNS AUTO: ABNORMAL HPF
SP GR UR STRIP: 1.01 (ref 1–1.03)
SQUAMOUS #/AREA URNS AUTO: ABNORMAL LPF
UROBILINOGEN UR STRIP-ACNC: ABNORMAL
WBC #/AREA URNS AUTO: ABNORMAL HPF

## 2020-03-29 LAB — BACTERIA SPEC CULT: ABNORMAL

## 2020-03-30 ENCOUNTER — RECORDS - HEALTHEAST (OUTPATIENT)
Dept: LAB | Facility: CLINIC | Age: 80
End: 2020-03-30

## 2020-03-31 ENCOUNTER — TELEPHONE (OUTPATIENT)
Dept: WOUND CARE | Facility: CLINIC | Age: 80
End: 2020-03-31

## 2020-03-31 LAB — INR PPP: 4.07 (ref 0.9–1.1)

## 2020-03-31 NOTE — TELEPHONE ENCOUNTER
Home Care RN called, states wounds are still macerated despite Allevyn dressings.  States has done well with Silvercel in the past, would like switch to that.  Contacted provider who agreed to begin Silvercel dressings with dressing changes every other day.  Follow up appointment made.  No further questions, comments.

## 2020-04-14 ENCOUNTER — RECORDS - HEALTHEAST (OUTPATIENT)
Dept: LAB | Facility: CLINIC | Age: 80
End: 2020-04-14

## 2020-04-14 LAB — INR PPP: 3.42 (ref 0.9–1.1)

## 2020-04-15 ENCOUNTER — HOSPITAL ENCOUNTER (OUTPATIENT)
Dept: WOUND CARE | Facility: CLINIC | Age: 80
End: 2020-04-15
Attending: PHYSICIAN ASSISTANT
Payer: MEDICARE

## 2020-04-15 DIAGNOSIS — S81.802A OPEN WOUND OF KNEE, LEG, AND ANKLE, LEFT, INITIAL ENCOUNTER: ICD-10-CM

## 2020-04-15 DIAGNOSIS — S91.002A OPEN WOUND OF KNEE, LEG, AND ANKLE, LEFT, INITIAL ENCOUNTER: ICD-10-CM

## 2020-04-15 DIAGNOSIS — S81.002A OPEN WOUND OF KNEE, LEG, AND ANKLE, LEFT, INITIAL ENCOUNTER: ICD-10-CM

## 2020-04-15 DIAGNOSIS — S81.801A OPEN WOUND OF LOWER LIMB, RIGHT, INITIAL ENCOUNTER: ICD-10-CM

## 2020-04-15 PROCEDURE — 99442 ZZC PHYSICIAN TELEPHONE EVALUATION 11-20 MIN: CPT | Performed by: PHYSICIAN ASSISTANT

## 2020-04-15 NOTE — PROGRESS NOTES
Patient evaluated during telephone visit. Certified Wound Care Nurse time spent evaluating patient record, completed a full evaluation and documented wound(s) & marty-wound skin; provided recommendation based on treatment plan. Reviewed discharge instructions, patient education, and discussed plan of care with appropriate medical team staff members and patient and/or family members.

## 2020-04-15 NOTE — PROGRESS NOTES
"Chance Shrestha is a 79 year old male who is being evaluated via a billable telephone visit.      The patient has been notified of following:     \"This telephone visit will be conducted via a call between you and your physician/provider. We have found that certain health care needs can be provided without the need for a physical exam.  This service lets us provide the care you need with a short phone conversation.  If a prescription is necessary we can send it directly to your pharmacy.  If lab work is needed we can place an order for that and you can then stop by our lab to have the test done at a later time.    Telephone visits are billed at different rates depending on your insurance coverage. During this emergency period, for some insurers they may be billed the same as an in-person visit.  Please reach out to your insurance provider with any questions.    If during the course of the call the physician/provider feels a telephone visit is not appropriate, you will not be charged for this service.\"    Patient has given verbal consent for Telephone visit?  Yes    [facility]    HISTORY OF PRESENT ILLNESS: Chance Shrestha is an 79 year old male who returns (via telephone due to COVID19)  today for right lower leg and bilateral foot ulcers. History significant for PAD, coumadin anticoagulation, ischemic heart disease s/p artificial heart valve with double bypass, a fib, HTN, hyperlipidemia, type 2 DM, and systolic CHF.     Known to us for previous left toe wound, has been followed by Dr. Jones due to PAD. Underwent angio 7/17/19 with angioplasty of the left peroneal artery. RLE was noted to have patent common femoral, profunda femoral, superficial femoral with mild multifocal stenosis of the popliteal artery, anterior and posterior tibial artery occlude in the proximal calf with single vessel runoff via the peroneal artery. No follow-up studies since July 2019.     INTERVAL HISTORY: "     Since last in-house visit 3/9/20 his wounds have deteriorated    New wounds on ankle and right first toe - suspect combination of trauma, pressure and arterial disease as etiology    Wound care nurse has not seen any improvement in wounds      BARRIERS TO HEALING: DM2, neuropathy, abnormal gait, PAD    DATE WOUND ACQUIRED: 12/3/2019    REVIEW OF SYSTEMS:  CONSTITUTIONAL: Denies fevers or acute illness  ENDOCRINE: diabetes is well controlled    PAST MEDICAL HISTORY:  has a past medical history of Constipation, Depression, DM type 2 (diabetes mellitus, type 2) (H), GERD (gastroesophageal reflux disease), Hernia, abdominal, HLD (hyperlipidemia), HTN (hypertension), Hypothyroid, Ischemic heart disease, Mumps, Seizures (H), and Systolic CHF (H).    SOCIAL HISTORY: resides in assisted living facility with home care nursing  TOBACCO STATUS:  reports that he has never smoked. He has never used smokeless tobacco.    MEDICATIONS:   Current Outpatient Medications   Medication     acetaminophen (TYLENOL) 32 mg/mL solution     acetaminophen (TYLENOL) 325 MG tablet     amLODIPine (NORVASC) 10 MG tablet     atorvastatin (LIPITOR) 80 MG tablet     Cholecalciferol (VITAMIN D-3) 5000 UNIT/ML LIQD     cholecalciferol (VITAMIN D3) 125 MCG (5000 UT) TABS tablet     clindamycin (CLEOCIN) 150 MG capsule     Dextromethorphan-guaiFENesin  MG/5ML LIQD     enoxaparin (LOVENOX) 80 MG/0.8ML syringe     ezetimibe (ZETIA) 10 MG tablet     fluticasone (FLONASE) 50 MCG/ACT nasal spray     fluticasone (FLONASE) 50 MCG/ACT nasal spray     glimepiride (AMARYL) 1 MG tablet     glimepiride (AMARYL) 2 MG tablet     ketorolac (TORADOL) 10 MG tablet     ketorolac (TORADOL) 10 MG tablet     levothyroxine (SYNTHROID/LEVOTHROID) 25 MCG tablet     lisinopril (PRINIVIL/ZESTRIL) 10 MG tablet     metFORMIN (GLUCOPHAGE) 1000 MG tablet     metoprolol tartrate (LOPRESSOR) 100 MG tablet     mirtazapine (REMERON) 15 MG tablet     Multiple Vitamin (DAILY  VITES) TABS     ondansetron (ZOFRAN-ODT) 4 MG ODT tab     order for DME     order for DME     polyethylene glycol (MIRALAX/GLYCOLAX) powder     polyethylene glycol 3350 POWD     ranitidine (ZANTAC) 75 MG tablet     risperiDONE (RISPERDAL) 1 MG tablet     traMADol (ULTRAM) 50 MG tablet     venlafaxine (EFFEXOR-XR) 37.5 MG 24 hr capsule     venlafaxine (EFFEXOR-XR) 75 MG 24 hr capsule     warfarin (COUMADIN) 7.5 MG tablet     warfarin ANTICOAGULANT (COUMADIN) 5 MG tablet     warfarin ANTICOAGULANT (COUMADIN) 7.5 MG tablet     No current facility-administered medications for this encounter.        INTEGUMENTARY:   Wound (used by [facility] only) 02/03/20 1104 Right 5 toe trauma (Active)   Length (cm) 1.25 04/15/20 1000   Width (cm) 0.5 04/15/20 1000   Depth (cm) 0.1 04/15/20 1000   Wound (cm^2) 0.62 cm^2 04/15/20 1000   Wound Volume (cm^3) 0.06 cm^3 04/15/20 1000   Wound healing % 67.37 04/15/20 1000   Dressing Appearance moist drainage 04/15/20 1000   Drainage Characteristics/Odor serosanguineous 04/15/20 1000   Drainage Amount moderate 04/15/20 1000       Wound (used by [facility] only) 02/24/20 1043 Left medial 1 toe trauma (Active)   Length (cm) 2.75 04/15/20 1000   Width (cm) 1.25 04/15/20 1000   Depth (cm) 0 04/15/20 1000   Wound (cm^2) 3.44 cm^2 04/15/20 1000   Wound Volume (cm^3) 0 cm^3 04/15/20 1000   Wound healing % -258.33 04/15/20 1000   Dressing Appearance moist drainage 04/15/20 1000   Drainage Characteristics/Odor serosanguineous 04/15/20 1000   Drainage Amount moderate 04/15/20 1000       Wound (used by [facility] only) 04/15/20 1050 Right 1 toe (Active)   Length (cm) 2.5 04/15/20 1000   Width (cm) 2 04/15/20 1000   Depth (cm) 0.1 04/15/20 1000   Wound (cm^2) 5 cm^2 04/15/20 1000   Wound Volume (cm^3) 0.5 cm^3 04/15/20 1000   Dressing Appearance moist drainage 04/15/20 1000   Drainage Characteristics/Odor serosanguineous 04/15/20 1000   Drainage Amount moderate 04/15/20 1000                    ASSESSMENT:    1. Full-thickness arterial ulcerations of bilateral lower legs and feet with fat-layer exposed  2. PAD    PLAN:   1. Very concerned about toe wounds and risk of infection due to PAD, recommend in-house visit for vascular exam and consultation with Dr. Jones to determine if further intervention is needed  2. Continue current dressings until in-house visit  3. Call clinic with any signs of infection    FOLLOW-UP: 1 week  DURATION OF CALL: 11-20mins    MUNA DEL VALLE PA-C ([facility])

## 2020-04-15 NOTE — DISCHARGE INSTRUCTIONS
[facility]  [address] [facility] [address], [facility] [address]    Call us at [phone] if you have any questions about your wounds, have redness or  swelling around your wound, have a fever of 101 or greater or if you have any other  problems or concerns. We answer the phone Monday through Friday 8 am to 4 pm,  please leave a message as we check the voicemail frequently throughout the day.    Chance Shrestha 1940    [facility] Phone [phone] Fax [phone]    Dressing recommendation to right and left great toe wound, right small toe wound  Cleanse with wound cleanser, cover wounds with Iodosorb followed by gauze on the outside, perform daily     Please raise your legs above your heart for 30 mins 3 times a day to promote wound healing.    Anali Parr PA-C, April 15, 2020    [facility] follow up with Dr. Jones on Monday 4-20-20 at 1:30 if he can arrange transportation, otherwise a telephone visit with Britt Parr PA-C in one week

## 2020-04-17 ENCOUNTER — TELEPHONE (OUTPATIENT)
Dept: WOUND CARE | Facility: CLINIC | Age: 80
End: 2020-04-17

## 2020-04-17 NOTE — TELEPHONE ENCOUNTER
Cassie with home care called and requested every other day since drainage has decreased. Silver cell is sticking. Okay to decrease to every other day and soak off silvercell. iodosorb gel is on order. If wounds take a turn for the worst then increase changes to daily. He does not have anyone who can do the dressing change and he cannot do it himself.  She expressed understanding.

## 2020-04-20 ENCOUNTER — RECORDS - HEALTHEAST (OUTPATIENT)
Dept: LAB | Facility: CLINIC | Age: 80
End: 2020-04-20

## 2020-04-21 LAB — INR PPP: 3.81 (ref 0.9–1.1)

## 2020-04-23 ENCOUNTER — TELEPHONE (OUTPATIENT)
Dept: WOUND CARE | Facility: CLINIC | Age: 80
End: 2020-04-23

## 2020-04-23 NOTE — TELEPHONE ENCOUNTER
I received a call from Kayla at [facility] stating that Chance has been placed in Hospice and therefore they need to cancel all future appointments at this time.  I will cancel his appointment with Dr. Jones on 04/247/20 and route this note as an FYI to the nurses.

## (undated) DEVICE — GUIDEWIRE SENSOR DUAL FLEX STR 0.035"X150CM M0066703080

## (undated) DEVICE — BAG CYSTO TABLE DRAIN

## (undated) DEVICE — PAD CHUX UNDERPAD 23X24" 7136

## (undated) DEVICE — PACK CYSTOSCOPY SBA15CYFSI

## (undated) DEVICE — SOL WATER IRRIG 3000ML BAG 2B7117

## (undated) DEVICE — BASKET STONE RETRIEVAL ZERO TIP 2.4FRX120CM M0063901010

## (undated) DEVICE — GLOVE PROTEXIS W/NEU-THERA 8.0  2D73TE80

## (undated) DEVICE — RAD RX ISOVUE 300 (50ML) 61% IOPAMIDOL CHARGE PER ML

## (undated) DEVICE — LASER FIBER HOLMIUM FLEXIVA 365UM M0068403920

## (undated) DEVICE — SOL WATER IRRIG 1000ML BOTTLE 2F7114

## (undated) DEVICE — CATH URETERAL OPEN END 6FR AXXCESS

## (undated) RX ORDER — PROPOFOL 10 MG/ML
INJECTION, EMULSION INTRAVENOUS
Status: DISPENSED
Start: 2019-11-05

## (undated) RX ORDER — CIPROFLOXACIN 2 MG/ML
INJECTION, SOLUTION INTRAVENOUS
Status: DISPENSED
Start: 2019-11-05

## (undated) RX ORDER — DEXAMETHASONE SODIUM PHOSPHATE 4 MG/ML
INJECTION, SOLUTION INTRA-ARTICULAR; INTRALESIONAL; INTRAMUSCULAR; INTRAVENOUS; SOFT TISSUE
Status: DISPENSED
Start: 2019-11-05

## (undated) RX ORDER — LIDOCAINE HYDROCHLORIDE 20 MG/ML
INJECTION, SOLUTION EPIDURAL; INFILTRATION; INTRACAUDAL; PERINEURAL
Status: DISPENSED
Start: 2019-11-05

## (undated) RX ORDER — ONDANSETRON 2 MG/ML
INJECTION INTRAMUSCULAR; INTRAVENOUS
Status: DISPENSED
Start: 2019-11-05

## (undated) RX ORDER — FENTANYL CITRATE 50 UG/ML
INJECTION, SOLUTION INTRAMUSCULAR; INTRAVENOUS
Status: DISPENSED
Start: 2019-11-05

## (undated) RX ORDER — ALBUMIN, HUMAN INJ 5% 5 %
SOLUTION INTRAVENOUS
Status: DISPENSED
Start: 2019-11-05